# Patient Record
Sex: FEMALE | Race: WHITE | NOT HISPANIC OR LATINO | Employment: OTHER | URBAN - METROPOLITAN AREA
[De-identification: names, ages, dates, MRNs, and addresses within clinical notes are randomized per-mention and may not be internally consistent; named-entity substitution may affect disease eponyms.]

---

## 2018-04-13 LAB
ALBUMIN SERPL BCP-MCNC: 4.2 G/DL (ref 3.5–5.7)
ALP SERPL-CCNC: 65 IU/L (ref 55–165)
ALT SERPL W P-5'-P-CCNC: 12 IU/L (ref 10–30)
ANION GAP SERPL CALCULATED.3IONS-SCNC: 11.2 MM/L
AST SERPL W P-5'-P-CCNC: 15 U/L (ref 7–26)
BASOPHILS # BLD AUTO: 0 X3/UL (ref 0–0.3)
BASOPHILS # BLD AUTO: 0.5 % (ref 0–2)
BILIRUB SERPL-MCNC: 0.5 MG/DL (ref 0.3–1)
BUN SERPL-MCNC: 13 MG/DL (ref 7–25)
CALCIUM SERPL-MCNC: 9.1 MG/DL (ref 8.6–10.5)
CHLORIDE SERPL-SCNC: 105 MM/L (ref 98–107)
CO2 SERPL-SCNC: 28 MM/L (ref 21–31)
CREAT SERPL-MCNC: 0.66 MG/DL (ref 0.6–1.2)
DEPRECATED RDW RBC AUTO: 15.5 %
EGFR (HISTORICAL): > 60 GFR
EGFR AFRICAN AMERICAN (HISTORICAL): > 60 GFR
EOSINOPHIL # BLD AUTO: 0.2 X3/UL (ref 0–0.5)
EOSINOPHIL NFR BLD AUTO: 5.6 % (ref 0–5)
GLUCOSE (HISTORICAL): 133 MG/DL (ref 65–99)
HCT VFR BLD AUTO: 40.4 % (ref 37–47)
HGB BLD-MCNC: 14.3 G/DL (ref 12–16)
LYMPHOCYTES # BLD AUTO: 1 X3/UL (ref 1.2–4.2)
LYMPHOCYTES NFR BLD AUTO: 24.5 % (ref 20.5–51.1)
MCH RBC QN AUTO: 35.9 PG (ref 26–34)
MCHC RBC AUTO-ENTMCNC: 35.4 G/DL (ref 31–37)
MCV RBC AUTO: 101.3 FL (ref 81–99)
MONOCYTES # BLD AUTO: 0.5 X3/UL (ref 0–1)
MONOCYTES NFR BLD AUTO: 12.7 % (ref 1.7–12)
NEUTROPHILS # BLD AUTO: 2.3 X3/UL (ref 1.4–6.5)
NEUTS SEG NFR BLD AUTO: 56.7 % (ref 42.2–75.2)
OSMOLALITY, SERUM (HISTORICAL): 281 MOSM (ref 262–291)
PLATELET # BLD AUTO: 220 X3/UL (ref 130–400)
PMV BLD AUTO: 7.7 FL
POTASSIUM SERPL-SCNC: 4.2 MM/L (ref 3.5–5.5)
RBC # BLD AUTO: 3.98 X6/UL (ref 3.9–5.2)
SODIUM SERPL-SCNC: 140 MM/L (ref 134–143)
TOTAL PROTEIN (HISTORICAL): 7 G/DL (ref 6.4–8.9)
WBC # BLD AUTO: 4 X3/UL (ref 4.8–10.8)

## 2018-04-27 LAB
ALBUMIN SERPL BCP-MCNC: 4.1 G/DL (ref 3.5–5.7)
ALP SERPL-CCNC: 64 IU/L (ref 55–165)
ALT SERPL W P-5'-P-CCNC: 17 IU/L (ref 10–30)
ANION GAP SERPL CALCULATED.3IONS-SCNC: 12.1 MM/L
AST SERPL W P-5'-P-CCNC: 22 U/L (ref 7–26)
BASOPHILS # BLD AUTO: 0 X3/UL (ref 0–0.3)
BASOPHILS # BLD AUTO: 0.4 % (ref 0–2)
BILIRUB SERPL-MCNC: 0.8 MG/DL (ref 0.3–1)
BUN SERPL-MCNC: 11 MG/DL (ref 7–25)
CALCIUM SERPL-MCNC: 9.1 MG/DL (ref 8.6–10.5)
CHLORIDE SERPL-SCNC: 104 MM/L (ref 98–107)
CO2 SERPL-SCNC: 28 MM/L (ref 21–31)
CREAT SERPL-MCNC: 0.61 MG/DL (ref 0.6–1.2)
DEPRECATED RDW RBC AUTO: 16.1 %
EGFR (HISTORICAL): > 60 GFR
EGFR AFRICAN AMERICAN (HISTORICAL): > 60 GFR
EOSINOPHIL # BLD AUTO: 0.2 X3/UL (ref 0–0.5)
EOSINOPHIL NFR BLD AUTO: 5.5 % (ref 0–5)
GLUCOSE (HISTORICAL): 102 MG/DL (ref 65–99)
HCT VFR BLD AUTO: 42.3 % (ref 37–47)
HGB BLD-MCNC: 15 G/DL (ref 12–16)
LYMPHOCYTES # BLD AUTO: 0.6 X3/UL (ref 1.2–4.2)
LYMPHOCYTES NFR BLD AUTO: 14.1 % (ref 20.5–51.1)
MCH RBC QN AUTO: 36 PG (ref 26–34)
MCHC RBC AUTO-ENTMCNC: 35.5 G/DL (ref 31–37)
MCV RBC AUTO: 101.5 FL (ref 81–99)
MONOCYTES # BLD AUTO: 0.6 X3/UL (ref 0–1)
MONOCYTES NFR BLD AUTO: 12.9 % (ref 1.7–12)
NEUTROPHILS # BLD AUTO: 3 X3/UL (ref 1.4–6.5)
NEUTS SEG NFR BLD AUTO: 67.1 % (ref 42.2–75.2)
OSMOLALITY, SERUM (HISTORICAL): 279 MOSM (ref 262–291)
PLATELET # BLD AUTO: 158 X3/UL (ref 130–400)
PMV BLD AUTO: 8 FL
POTASSIUM SERPL-SCNC: 4.1 MM/L (ref 3.5–5.5)
RBC # BLD AUTO: 4.16 X6/UL (ref 3.9–5.2)
SODIUM SERPL-SCNC: 140 MM/L (ref 134–143)
TOTAL PROTEIN (HISTORICAL): 6.9 G/DL (ref 6.4–8.9)
WBC # BLD AUTO: 4.4 X3/UL (ref 4.8–10.8)

## 2018-04-29 LAB — CANCER AG19-9 SERPL-ACNC: 12 U/ML (ref 0–35)

## 2018-05-07 LAB
BASOPHILS # BLD AUTO: 0 X3/UL (ref 0–0.3)
BASOPHILS # BLD AUTO: 0.7 % (ref 0–2)
DEPRECATED RDW RBC AUTO: 16.5 %
EOSINOPHIL # BLD AUTO: 0.3 X3/UL (ref 0–0.5)
EOSINOPHIL NFR BLD AUTO: 6.4 % (ref 0–5)
HCT VFR BLD AUTO: 41.2 % (ref 37–47)
HGB BLD-MCNC: 14.5 G/DL (ref 12–16)
INR PPP: 1.15 (ref 0.9–1.5)
LYMPHOCYTES # BLD AUTO: 0.6 X3/UL (ref 1.2–4.2)
LYMPHOCYTES NFR BLD AUTO: 12.2 % (ref 20.5–51.1)
MCH RBC QN AUTO: 35.8 PG (ref 26–34)
MCHC RBC AUTO-ENTMCNC: 35.3 G/DL (ref 31–37)
MCV RBC AUTO: 101.3 FL (ref 81–99)
MONOCYTES # BLD AUTO: 0.8 X3/UL (ref 0–1)
MONOCYTES NFR BLD AUTO: 16.6 % (ref 1.7–12)
NEUTROPHILS # BLD AUTO: 3.1 X3/UL (ref 1.4–6.5)
NEUTS SEG NFR BLD AUTO: 64.1 % (ref 42.2–75.2)
PLATELET # BLD AUTO: 154 X3/UL (ref 130–400)
PMV BLD AUTO: 7.6 FL
PROTHROMBIN TIME (HISTORICAL): 13.3 SEC (ref 10.1–12.9)
RBC # BLD AUTO: 4.06 X6/UL (ref 3.9–5.2)
WBC # BLD AUTO: 4.9 X3/UL (ref 4.8–10.8)

## 2018-06-08 LAB
ALBUMIN SERPL BCP-MCNC: 4 G/DL (ref 3.5–5.7)
ALP SERPL-CCNC: 68 IU/L (ref 55–165)
ALT SERPL W P-5'-P-CCNC: 13 IU/L (ref 10–30)
ANION GAP SERPL CALCULATED.3IONS-SCNC: 13.1 MM/L
AST SERPL W P-5'-P-CCNC: 16 U/L (ref 7–26)
BASOPHILS # BLD AUTO: 0 X3/UL (ref 0–0.3)
BASOPHILS # BLD AUTO: 0.5 % (ref 0–2)
BILIRUB SERPL-MCNC: 0.5 MG/DL (ref 0.3–1)
BUN SERPL-MCNC: 11 MG/DL (ref 7–25)
CALCIUM SERPL-MCNC: 8.7 MG/DL (ref 8.6–10.5)
CHLORIDE SERPL-SCNC: 103 MM/L (ref 98–107)
CO2 SERPL-SCNC: 28 MM/L (ref 21–31)
CREAT SERPL-MCNC: 0.75 MG/DL (ref 0.6–1.2)
DEPRECATED RDW RBC AUTO: 14.8 %
EGFR (HISTORICAL): > 60 GFR
EGFR AFRICAN AMERICAN (HISTORICAL): > 60 GFR
EOSINOPHIL # BLD AUTO: 0.3 X3/UL (ref 0–0.5)
EOSINOPHIL NFR BLD AUTO: 4.9 % (ref 0–5)
GLUCOSE (HISTORICAL): 104 MG/DL (ref 65–99)
HCT VFR BLD AUTO: 42.3 % (ref 37–47)
HGB BLD-MCNC: 14.8 G/DL (ref 12–16)
LYMPHOCYTES # BLD AUTO: 1 X3/UL (ref 1.2–4.2)
LYMPHOCYTES NFR BLD AUTO: 19.8 % (ref 20.5–51.1)
MCH RBC QN AUTO: 34.9 PG (ref 26–34)
MCHC RBC AUTO-ENTMCNC: 35 G/DL (ref 31–37)
MCV RBC AUTO: 99.8 FL (ref 81–99)
MONOCYTES # BLD AUTO: 0.6 X3/UL (ref 0–1)
MONOCYTES NFR BLD AUTO: 12.3 % (ref 1.7–12)
NEUTROPHILS # BLD AUTO: 3.3 X3/UL (ref 1.4–6.5)
NEUTS SEG NFR BLD AUTO: 62.5 % (ref 42.2–75.2)
OSMOLALITY, SERUM (HISTORICAL): 279 MOSM (ref 262–291)
PLATELET # BLD AUTO: 199 X3/UL (ref 130–400)
PMV BLD AUTO: 7.9 FL
POTASSIUM SERPL-SCNC: 4.1 MM/L (ref 3.5–5.5)
RBC # BLD AUTO: 4.24 X6/UL (ref 3.9–5.2)
SODIUM SERPL-SCNC: 140 MM/L (ref 134–143)
TOTAL PROTEIN (HISTORICAL): 6.7 G/DL (ref 6.4–8.9)
WBC # BLD AUTO: 5.3 X3/UL (ref 4.8–10.8)

## 2018-06-11 LAB — CANCER AG19-9 SERPL-ACNC: 16 U/ML (ref 0–35)

## 2018-07-03 ENCOUNTER — TRANSCRIBE ORDERS (OUTPATIENT)
Dept: ADMINISTRATIVE | Facility: HOSPITAL | Age: 68
End: 2018-07-03

## 2018-07-03 ENCOUNTER — APPOINTMENT (OUTPATIENT)
Dept: LAB | Facility: HOSPITAL | Age: 68
End: 2018-07-03
Payer: COMMERCIAL

## 2018-07-03 DIAGNOSIS — E55.9 VITAMIN D DEFICIENCY: ICD-10-CM

## 2018-07-03 DIAGNOSIS — R53.83 FATIGUE, UNSPECIFIED TYPE: ICD-10-CM

## 2018-07-03 DIAGNOSIS — R10.84 ABDOMINAL PAIN, GENERALIZED: ICD-10-CM

## 2018-07-03 DIAGNOSIS — R53.83 FATIGUE, UNSPECIFIED TYPE: Primary | ICD-10-CM

## 2018-07-03 DIAGNOSIS — E78.2 MIXED HYPERLIPIDEMIA: ICD-10-CM

## 2018-07-03 LAB
25(OH)D3 SERPL-MCNC: 15.4 NG/ML (ref 30–100)
ALBUMIN SERPL BCP-MCNC: 4 G/DL (ref 3.5–5.7)
ALP SERPL-CCNC: 71 U/L (ref 55–165)
ALT SERPL W P-5'-P-CCNC: 15 U/L (ref 7–52)
ANION GAP SERPL CALCULATED.3IONS-SCNC: 6 MMOL/L (ref 4–13)
AST SERPL W P-5'-P-CCNC: 16 U/L (ref 13–39)
BASOPHILS # BLD AUTO: 0 THOUSANDS/ΜL (ref 0–0.1)
BASOPHILS NFR BLD AUTO: 1 % (ref 0–1)
BILIRUB DIRECT SERPL-MCNC: 0.1 MG/DL (ref 0–0.2)
BILIRUB SERPL-MCNC: 0.5 MG/DL (ref 0.2–1)
BUN SERPL-MCNC: 15 MG/DL (ref 7–25)
CALCIUM SERPL-MCNC: 9.1 MG/DL (ref 8.6–10.5)
CHLORIDE SERPL-SCNC: 107 MMOL/L (ref 98–107)
CHOLEST SERPL-MCNC: 125 MG/DL (ref 0–200)
CO2 SERPL-SCNC: 28 MMOL/L (ref 21–31)
CREAT SERPL-MCNC: 0.7 MG/DL (ref 0.6–1.2)
EOSINOPHIL # BLD AUTO: 0.2 THOUSAND/ΜL (ref 0–0.61)
EOSINOPHIL NFR BLD AUTO: 5 % (ref 0–6)
ERYTHROCYTE [DISTWIDTH] IN BLOOD BY AUTOMATED COUNT: 14.4 % (ref 11.6–15.1)
FERRITIN SERPL-MCNC: 89 NG/ML (ref 8–388)
GFR SERPL CREATININE-BSD FRML MDRD: 90 ML/MIN/1.73SQ M
GLUCOSE P FAST SERPL-MCNC: 105 MG/DL (ref 65–99)
HCT VFR BLD AUTO: 43.5 % (ref 37–47)
HDLC SERPL-MCNC: 38 MG/DL (ref 40–60)
HGB BLD-MCNC: 15.2 G/DL (ref 11.5–15.4)
IRON SATN MFR SERPL: 25 %
IRON SERPL-MCNC: 79 UG/DL (ref 50–170)
LDLC SERPL CALC-MCNC: 67 MG/DL (ref 0–100)
LYMPHOCYTES # BLD AUTO: 0.9 THOUSANDS/ΜL (ref 0.6–4.47)
LYMPHOCYTES NFR BLD AUTO: 19 % (ref 14–44)
MCH RBC QN AUTO: 33.9 PG (ref 26.8–34.3)
MCHC RBC AUTO-ENTMCNC: 35 G/DL (ref 31.4–37.4)
MCV RBC AUTO: 97 FL (ref 82–98)
MONOCYTES # BLD AUTO: 0.5 THOUSAND/ΜL (ref 0.17–1.22)
MONOCYTES NFR BLD AUTO: 12 % (ref 4–12)
NEUTROPHILS # BLD AUTO: 2.9 THOUSANDS/ΜL (ref 1.85–7.62)
NEUTS SEG NFR BLD AUTO: 63 % (ref 43–75)
NONHDLC SERPL-MCNC: 87 MG/DL
NRBC BLD AUTO-RTO: 0 /100 WBCS
PLATELET # BLD AUTO: 225 THOUSANDS/UL (ref 149–390)
PMV BLD AUTO: 7.7 FL (ref 8.9–12.7)
POTASSIUM SERPL-SCNC: 4.1 MMOL/L (ref 3.5–5.5)
PROT SERPL-MCNC: 7.3 G/DL (ref 6.4–8.9)
RBC # BLD AUTO: 4.49 MILLION/UL (ref 3.81–5.12)
SODIUM SERPL-SCNC: 141 MMOL/L (ref 134–143)
TIBC SERPL-MCNC: 312 UG/DL (ref 250–450)
TRIGL SERPL-MCNC: 99 MG/DL (ref 44–166)
VIT B12 SERPL-MCNC: 315 PG/ML (ref 100–900)
WBC # BLD AUTO: 4.6 THOUSAND/UL (ref 4.31–10.16)

## 2018-07-03 PROCEDURE — 80061 LIPID PANEL: CPT

## 2018-07-03 PROCEDURE — 83550 IRON BINDING TEST: CPT

## 2018-07-03 PROCEDURE — 83540 ASSAY OF IRON: CPT

## 2018-07-03 PROCEDURE — 80048 BASIC METABOLIC PNL TOTAL CA: CPT

## 2018-07-03 PROCEDURE — 36415 COLL VENOUS BLD VENIPUNCTURE: CPT

## 2018-07-03 PROCEDURE — 80076 HEPATIC FUNCTION PANEL: CPT

## 2018-07-03 PROCEDURE — 82306 VITAMIN D 25 HYDROXY: CPT

## 2018-07-03 PROCEDURE — 85025 COMPLETE CBC W/AUTO DIFF WBC: CPT

## 2018-07-03 PROCEDURE — 82728 ASSAY OF FERRITIN: CPT

## 2018-07-03 PROCEDURE — 82607 VITAMIN B-12: CPT

## 2018-10-01 ENCOUNTER — APPOINTMENT (OUTPATIENT)
Dept: LAB | Facility: HOSPITAL | Age: 68
End: 2018-10-01
Payer: COMMERCIAL

## 2018-10-01 ENCOUNTER — TRANSCRIBE ORDERS (OUTPATIENT)
Dept: ADMINISTRATIVE | Facility: HOSPITAL | Age: 68
End: 2018-10-01

## 2018-10-01 DIAGNOSIS — R10.84 ABDOMINAL PAIN, GENERALIZED: ICD-10-CM

## 2018-10-01 DIAGNOSIS — R53.83 FATIGUE, UNSPECIFIED TYPE: ICD-10-CM

## 2018-10-01 DIAGNOSIS — E78.2 MIXED HYPERLIPIDEMIA: ICD-10-CM

## 2018-10-01 DIAGNOSIS — E55.9 VITAMIN D INSUFFICIENCY: ICD-10-CM

## 2018-10-01 DIAGNOSIS — C23 MALIGNANT NEOPLASM OF GALLBLADDER (HCC): ICD-10-CM

## 2018-10-01 DIAGNOSIS — R53.83 FATIGUE, UNSPECIFIED TYPE: Primary | ICD-10-CM

## 2018-10-01 DIAGNOSIS — C23 MALIGNANT NEOPLASM OF GALLBLADDER (HCC): Primary | ICD-10-CM

## 2018-10-01 LAB
25(OH)D3 SERPL-MCNC: 21.4 NG/ML (ref 30–100)
ALBUMIN SERPL BCP-MCNC: 4.1 G/DL (ref 3.5–5.7)
ALP SERPL-CCNC: 72 U/L (ref 55–165)
ALT SERPL W P-5'-P-CCNC: 14 U/L (ref 7–52)
ANION GAP SERPL CALCULATED.3IONS-SCNC: 7 MMOL/L (ref 4–13)
AST SERPL W P-5'-P-CCNC: 15 U/L (ref 13–39)
BASOPHILS # BLD AUTO: 0 THOUSANDS/ΜL (ref 0–0.1)
BASOPHILS NFR BLD AUTO: 1 % (ref 0–1)
BILIRUB DIRECT SERPL-MCNC: 0.2 MG/DL (ref 0–0.2)
BILIRUB SERPL-MCNC: 0.6 MG/DL (ref 0.2–1)
BUN SERPL-MCNC: 13 MG/DL (ref 7–25)
CALCIUM SERPL-MCNC: 9.3 MG/DL (ref 8.6–10.5)
CHLORIDE SERPL-SCNC: 106 MMOL/L (ref 98–107)
CHOLEST SERPL-MCNC: 117 MG/DL (ref 0–200)
CO2 SERPL-SCNC: 28 MMOL/L (ref 21–31)
CREAT SERPL-MCNC: 0.75 MG/DL (ref 0.6–1.2)
EOSINOPHIL # BLD AUTO: 0.3 THOUSAND/ΜL (ref 0–0.61)
EOSINOPHIL NFR BLD AUTO: 5 % (ref 0–6)
ERYTHROCYTE [DISTWIDTH] IN BLOOD BY AUTOMATED COUNT: 13.3 % (ref 11.6–15.1)
FERRITIN SERPL-MCNC: 90 NG/ML (ref 8–388)
GFR SERPL CREATININE-BSD FRML MDRD: 83 ML/MIN/1.73SQ M
GLUCOSE P FAST SERPL-MCNC: 100 MG/DL (ref 65–99)
HCT VFR BLD AUTO: 44.8 % (ref 37–47)
HDLC SERPL-MCNC: 41 MG/DL (ref 40–60)
HGB BLD-MCNC: 15.1 G/DL (ref 11.5–15.4)
IRON SATN MFR SERPL: 26 %
IRON SERPL-MCNC: 75 UG/DL (ref 50–170)
LDLC SERPL CALC-MCNC: 57 MG/DL (ref 0–100)
LYMPHOCYTES # BLD AUTO: 1.1 THOUSANDS/ΜL (ref 0.6–4.47)
LYMPHOCYTES NFR BLD AUTO: 23 % (ref 14–44)
MCH RBC QN AUTO: 32 PG (ref 26.8–34.3)
MCHC RBC AUTO-ENTMCNC: 33.8 G/DL (ref 31.4–37.4)
MCV RBC AUTO: 95 FL (ref 82–98)
MONOCYTES # BLD AUTO: 0.4 THOUSAND/ΜL (ref 0.17–1.22)
MONOCYTES NFR BLD AUTO: 8 % (ref 4–12)
NEUTROPHILS # BLD AUTO: 3.2 THOUSANDS/ΜL (ref 1.85–7.62)
NEUTS SEG NFR BLD AUTO: 63 % (ref 43–75)
NONHDLC SERPL-MCNC: 76 MG/DL
NRBC BLD AUTO-RTO: 0 /100 WBCS
PLATELET # BLD AUTO: 228 THOUSANDS/UL (ref 149–390)
PMV BLD AUTO: 7.3 FL (ref 8.9–12.7)
POTASSIUM SERPL-SCNC: 4.4 MMOL/L (ref 3.5–5.5)
PROT SERPL-MCNC: 7.2 G/DL (ref 6.4–8.9)
RBC # BLD AUTO: 4.73 MILLION/UL (ref 3.81–5.12)
SODIUM SERPL-SCNC: 141 MMOL/L (ref 134–143)
TIBC SERPL-MCNC: 292 UG/DL (ref 250–450)
TRIGL SERPL-MCNC: 96 MG/DL (ref 44–166)
VIT B12 SERPL-MCNC: 339 PG/ML (ref 100–900)
WBC # BLD AUTO: 5 THOUSAND/UL (ref 4.31–10.16)

## 2018-10-01 PROCEDURE — 36415 COLL VENOUS BLD VENIPUNCTURE: CPT

## 2018-10-01 PROCEDURE — 82248 BILIRUBIN DIRECT: CPT

## 2018-10-01 PROCEDURE — 82607 VITAMIN B-12: CPT

## 2018-10-01 PROCEDURE — 80061 LIPID PANEL: CPT

## 2018-10-01 PROCEDURE — 83540 ASSAY OF IRON: CPT

## 2018-10-01 PROCEDURE — 82306 VITAMIN D 25 HYDROXY: CPT

## 2018-10-01 PROCEDURE — 86301 IMMUNOASSAY TUMOR CA 19-9: CPT

## 2018-10-01 PROCEDURE — 85025 COMPLETE CBC W/AUTO DIFF WBC: CPT

## 2018-10-01 PROCEDURE — 80053 COMPREHEN METABOLIC PANEL: CPT

## 2018-10-01 PROCEDURE — 83550 IRON BINDING TEST: CPT

## 2018-10-01 PROCEDURE — 82728 ASSAY OF FERRITIN: CPT

## 2018-10-02 LAB — CANCER AG19-9 SERPL-ACNC: 13 U/ML (ref 0–35)

## 2018-10-03 ENCOUNTER — HOSPITAL ENCOUNTER (OUTPATIENT)
Dept: RADIOLOGY | Facility: HOSPITAL | Age: 68
Discharge: HOME | End: 2018-10-03
Payer: COMMERCIAL

## 2018-10-03 ENCOUNTER — TRANSCRIBE ORDERS (OUTPATIENT)
Dept: ADMINISTRATIVE | Facility: HOSPITAL | Age: 68
End: 2018-10-03

## 2018-10-03 DIAGNOSIS — M54.5 LOW BACK PAIN, UNSPECIFIED BACK PAIN LATERALITY, UNSPECIFIED CHRONICITY, WITH SCIATICA PRESENCE UNSPECIFIED: ICD-10-CM

## 2018-10-03 DIAGNOSIS — M54.5 LOW BACK PAIN, UNSPECIFIED BACK PAIN LATERALITY, UNSPECIFIED CHRONICITY, WITH SCIATICA PRESENCE UNSPECIFIED: Primary | ICD-10-CM

## 2018-10-03 PROCEDURE — 72072 X-RAY EXAM THORAC SPINE 3VWS: CPT

## 2018-10-03 PROCEDURE — 72110 X-RAY EXAM L-2 SPINE 4/>VWS: CPT

## 2019-01-07 ENCOUNTER — TRANSCRIBE ORDERS (OUTPATIENT)
Dept: ADMINISTRATIVE | Facility: HOSPITAL | Age: 69
End: 2019-01-07

## 2019-01-07 ENCOUNTER — APPOINTMENT (OUTPATIENT)
Dept: LAB | Facility: HOSPITAL | Age: 69
End: 2019-01-07
Payer: COMMERCIAL

## 2019-01-07 DIAGNOSIS — C23 PRIMARY ADENOCARCINOMA OF GALL BLADDER (HCC): ICD-10-CM

## 2019-01-07 DIAGNOSIS — C23 PRIMARY ADENOCARCINOMA OF GALL BLADDER (HCC): Primary | ICD-10-CM

## 2019-01-07 LAB
ALBUMIN SERPL BCP-MCNC: 4.4 G/DL (ref 3.5–5.7)
ALP SERPL-CCNC: 97 U/L (ref 55–165)
ALT SERPL W P-5'-P-CCNC: 13 U/L (ref 7–52)
ANION GAP SERPL CALCULATED.3IONS-SCNC: 8 MMOL/L (ref 4–13)
AST SERPL W P-5'-P-CCNC: 15 U/L (ref 13–39)
BASOPHILS # BLD AUTO: 0 THOUSANDS/ΜL (ref 0–0.1)
BASOPHILS NFR BLD AUTO: 1 % (ref 0–1)
BILIRUB SERPL-MCNC: 0.6 MG/DL (ref 0.2–1)
BUN SERPL-MCNC: 14 MG/DL (ref 7–25)
CALCIUM SERPL-MCNC: 9.6 MG/DL (ref 8.6–10.5)
CHLORIDE SERPL-SCNC: 103 MMOL/L (ref 98–107)
CO2 SERPL-SCNC: 30 MMOL/L (ref 21–31)
CREAT SERPL-MCNC: 0.78 MG/DL (ref 0.6–1.2)
EOSINOPHIL # BLD AUTO: 0.3 THOUSAND/ΜL (ref 0–0.61)
EOSINOPHIL NFR BLD AUTO: 5 % (ref 0–6)
ERYTHROCYTE [DISTWIDTH] IN BLOOD BY AUTOMATED COUNT: 13.8 % (ref 11.6–15.1)
GFR SERPL CREATININE-BSD FRML MDRD: 79 ML/MIN/1.73SQ M
GLUCOSE SERPL-MCNC: 110 MG/DL (ref 65–140)
HCT VFR BLD AUTO: 46.4 % (ref 37–47)
HGB BLD-MCNC: 15.7 G/DL (ref 11.5–15.4)
LYMPHOCYTES # BLD AUTO: 1.4 THOUSANDS/ΜL (ref 0.6–4.47)
LYMPHOCYTES NFR BLD AUTO: 25 % (ref 14–44)
MCH RBC QN AUTO: 31.9 PG (ref 26.8–34.3)
MCHC RBC AUTO-ENTMCNC: 33.9 G/DL (ref 31.4–37.4)
MCV RBC AUTO: 94 FL (ref 82–98)
MONOCYTES # BLD AUTO: 0.5 THOUSAND/ΜL (ref 0.17–1.22)
MONOCYTES NFR BLD AUTO: 8 % (ref 4–12)
NEUTROPHILS # BLD AUTO: 3.6 THOUSANDS/ΜL (ref 1.85–7.62)
NEUTS SEG NFR BLD AUTO: 62 % (ref 43–75)
NRBC BLD AUTO-RTO: 0 /100 WBCS
PLATELET # BLD AUTO: 242 THOUSANDS/UL (ref 149–390)
PMV BLD AUTO: 7.3 FL (ref 8.9–12.7)
POTASSIUM SERPL-SCNC: 4.9 MMOL/L (ref 3.5–5.5)
PROT SERPL-MCNC: 7.6 G/DL (ref 6.4–8.9)
RBC # BLD AUTO: 4.93 MILLION/UL (ref 3.81–5.12)
SODIUM SERPL-SCNC: 141 MMOL/L (ref 134–143)
WBC # BLD AUTO: 5.8 THOUSAND/UL (ref 4.31–10.16)

## 2019-01-07 PROCEDURE — 80053 COMPREHEN METABOLIC PANEL: CPT

## 2019-01-07 PROCEDURE — 86301 IMMUNOASSAY TUMOR CA 19-9: CPT

## 2019-01-07 PROCEDURE — 36415 COLL VENOUS BLD VENIPUNCTURE: CPT

## 2019-01-07 PROCEDURE — 85025 COMPLETE CBC W/AUTO DIFF WBC: CPT

## 2019-01-08 LAB — CANCER AG19-9 SERPL-ACNC: 10 U/ML (ref 0–35)

## 2019-04-16 ENCOUNTER — TRANSCRIBE ORDERS (OUTPATIENT)
Dept: ADMINISTRATIVE | Facility: HOSPITAL | Age: 69
End: 2019-04-16

## 2019-04-16 DIAGNOSIS — H93.13 TINNITUS, BILATERAL: ICD-10-CM

## 2019-04-16 DIAGNOSIS — H93.13 TINNITUS OF BOTH EARS: ICD-10-CM

## 2019-04-16 DIAGNOSIS — R42 VERTIGO: Primary | ICD-10-CM

## 2019-04-19 ENCOUNTER — HOSPITAL ENCOUNTER (OUTPATIENT)
Dept: CT IMAGING | Facility: HOSPITAL | Age: 69
Discharge: HOME | End: 2019-04-19
Payer: COMMERCIAL

## 2019-04-19 DIAGNOSIS — R42 VERTIGO: ICD-10-CM

## 2019-04-19 DIAGNOSIS — H93.13 TINNITUS, BILATERAL: ICD-10-CM

## 2019-04-19 PROCEDURE — 70450 CT HEAD/BRAIN W/O DYE: CPT

## 2019-05-05 ENCOUNTER — OFFICE VISIT (OUTPATIENT)
Dept: URGENT CARE | Facility: CLINIC | Age: 69
End: 2019-05-05
Payer: COMMERCIAL

## 2019-05-05 VITALS
TEMPERATURE: 98.1 F | SYSTOLIC BLOOD PRESSURE: 139 MMHG | DIASTOLIC BLOOD PRESSURE: 81 MMHG | WEIGHT: 184 LBS | HEIGHT: 63 IN | HEART RATE: 76 BPM | BODY MASS INDEX: 32.6 KG/M2

## 2019-05-05 DIAGNOSIS — R30.0 DYSURIA: ICD-10-CM

## 2019-05-05 DIAGNOSIS — N39.0 URINARY TRACT INFECTION WITH HEMATURIA, SITE UNSPECIFIED: Primary | ICD-10-CM

## 2019-05-05 DIAGNOSIS — R31.9 URINARY TRACT INFECTION WITH HEMATURIA, SITE UNSPECIFIED: Primary | ICD-10-CM

## 2019-05-05 LAB
SL AMB  POCT GLUCOSE, UA: ABNORMAL
SL AMB LEUKOCYTE ESTERASE,UA: ABNORMAL
SL AMB POCT BILIRUBIN,UA: ABNORMAL
SL AMB POCT BLOOD,UA: ABNORMAL
SL AMB POCT CLARITY,UA: ABNORMAL
SL AMB POCT COLOR,UA: YELLOW
SL AMB POCT KETONES,UA: ABNORMAL
SL AMB POCT NITRITE,UA: ABNORMAL
SL AMB POCT PH,UA: 6
SL AMB POCT SPECIFIC GRAVITY,UA: 1.03
SL AMB POCT URINE PROTEIN: ABNORMAL
SL AMB POCT UROBILINOGEN: 0.2

## 2019-05-05 PROCEDURE — 87086 URINE CULTURE/COLONY COUNT: CPT | Performed by: PHYSICIAN ASSISTANT

## 2019-05-05 PROCEDURE — 99203 OFFICE O/P NEW LOW 30 MIN: CPT | Performed by: PHYSICIAN ASSISTANT

## 2019-05-05 PROCEDURE — 81002 URINALYSIS NONAUTO W/O SCOPE: CPT | Performed by: PHYSICIAN ASSISTANT

## 2019-05-05 PROCEDURE — S9088 SERVICES PROVIDED IN URGENT: HCPCS | Performed by: PHYSICIAN ASSISTANT

## 2019-05-05 RX ORDER — TOBRAMYCIN AND DEXAMETHASONE 3; 1 MG/ML; MG/ML
SUSPENSION/ DROPS OPHTHALMIC
COMMUNITY
Start: 2019-02-18 | End: 2019-09-03

## 2019-05-05 RX ORDER — OMEPRAZOLE 20 MG/1
CAPSULE, DELAYED RELEASE ORAL
COMMUNITY
Start: 2019-04-30 | End: 2019-09-23 | Stop reason: SDUPTHER

## 2019-05-05 RX ORDER — NITROFURANTOIN 25; 75 MG/1; MG/1
100 CAPSULE ORAL 2 TIMES DAILY
Qty: 14 CAPSULE | Refills: 0 | Status: SHIPPED | OUTPATIENT
Start: 2019-05-05 | End: 2019-05-12

## 2019-05-05 RX ORDER — ATORVASTATIN CALCIUM 20 MG/1
TABLET, FILM COATED ORAL
COMMUNITY
Start: 2019-04-18 | End: 2019-09-23 | Stop reason: SDUPTHER

## 2019-05-05 RX ORDER — ACETAMINOPHEN 325 MG/1
TABLET ORAL AS NEEDED
COMMUNITY
End: 2022-03-10

## 2019-05-07 LAB — BACTERIA UR CULT: NORMAL

## 2019-09-03 ENCOUNTER — OFFICE VISIT (OUTPATIENT)
Dept: FAMILY MEDICINE CLINIC | Facility: CLINIC | Age: 69
End: 2019-09-03
Payer: COMMERCIAL

## 2019-09-03 VITALS
HEART RATE: 78 BPM | SYSTOLIC BLOOD PRESSURE: 114 MMHG | DIASTOLIC BLOOD PRESSURE: 72 MMHG | RESPIRATION RATE: 18 BRPM | WEIGHT: 176.2 LBS | HEIGHT: 63 IN | TEMPERATURE: 97.7 F | BODY MASS INDEX: 31.22 KG/M2 | OXYGEN SATURATION: 98 %

## 2019-09-03 DIAGNOSIS — Z11.59 NEED FOR HEPATITIS C SCREENING TEST: ICD-10-CM

## 2019-09-03 DIAGNOSIS — E78.5 HYPERLIPIDEMIA, UNSPECIFIED HYPERLIPIDEMIA TYPE: ICD-10-CM

## 2019-09-03 DIAGNOSIS — Z12.39 SCREENING FOR BREAST CANCER: ICD-10-CM

## 2019-09-03 DIAGNOSIS — E66.09 CLASS 1 OBESITY DUE TO EXCESS CALORIES WITHOUT SERIOUS COMORBIDITY WITH BODY MASS INDEX (BMI) OF 31.0 TO 31.9 IN ADULT: ICD-10-CM

## 2019-09-03 DIAGNOSIS — E55.9 VITAMIN D DEFICIENCY: ICD-10-CM

## 2019-09-03 DIAGNOSIS — Z79.899 MEDICATION MANAGEMENT: ICD-10-CM

## 2019-09-03 DIAGNOSIS — L30.9 DERMATITIS: Primary | ICD-10-CM

## 2019-09-03 DIAGNOSIS — Z13.220 SCREENING FOR CHOLESTEROL LEVEL: ICD-10-CM

## 2019-09-03 DIAGNOSIS — G47.9 SLEEP DISTURBANCE: ICD-10-CM

## 2019-09-03 DIAGNOSIS — F32.A DEPRESSION, UNSPECIFIED DEPRESSION TYPE: ICD-10-CM

## 2019-09-03 PROCEDURE — 99204 OFFICE O/P NEW MOD 45 MIN: CPT | Performed by: NURSE PRACTITIONER

## 2019-09-03 RX ORDER — TRIAMCINOLONE ACETONIDE 5 MG/G
CREAM TOPICAL 3 TIMES DAILY
Qty: 30 G | Refills: 0 | Status: SHIPPED | OUTPATIENT
Start: 2019-09-03 | End: 2020-05-21 | Stop reason: SDUPTHER

## 2019-09-03 RX ORDER — TRAVOPROST OPHTHALMIC SOLUTION 0.04 MG/ML
1 SOLUTION OPHTHALMIC
COMMUNITY
End: 2022-03-10

## 2019-09-03 RX ORDER — LANOLIN ALCOHOL/MO/W.PET/CERES
3 CREAM (GRAM) TOPICAL
Qty: 30 EACH | Refills: 2 | Status: SHIPPED | OUTPATIENT
Start: 2019-09-03 | End: 2020-04-27

## 2019-09-03 RX ORDER — BUPROPION HYDROCHLORIDE 150 MG/1
150 TABLET ORAL DAILY
Qty: 30 TABLET | Refills: 1 | Status: SHIPPED | OUTPATIENT
Start: 2019-09-03 | End: 2020-05-21

## 2019-09-03 NOTE — PROGRESS NOTES
[address] [place] [address] Primary Care        NAME: Vandana Cruz is a 77 y o  female  : 1950    MRN: [id number]  DATE: September 3, 2019  TIME: 3:12 PM    Assessment and Plan   Dermatitis [L30 9]  1  Dermatitis  triamcinolone (KENALOG) 0 5 % cream   2  Sleep disturbance  melatonin 3 mg   3  Depression, unspecified depression type  buPROPion (WELLBUTRIN XL) 150 mg 24 hr tablet   4  Screening for breast cancer  Mammo screening bilateral w 3d & cad   5  Vitamin D deficiency  Vitamin D 25 hydroxy   6  Screening for cholesterol level     7  Hyperlipidemia, unspecified hyperlipidemia type  Lipid panel   8  Medication management  Comprehensive metabolic panel   9  Class 1 obesity due to excess calories without serious comorbidity with body mass index (BMI) of 31 0 to 31 9 in adult     10  Need for hepatitis C screening test  Hepatitis C antibody       Starting Wellbutrinxl 150 mg daily  Will have patient follow up in 4 week  If tolerating will increase to 300mg  Due for Medicare wellness visit  BMI Counseling: Body mass index is 31 21 kg/m²  Discussed the patient's BMI with her  The BMI is above average  BMI counseling and education was provided to the patient  Nutrition recommendations include 3-5 servings of fruits/vegetables daily, moderation in carbohydrate intake, increasing intake of lean protein, reducing intake of saturated fat and trans fat and reducing intake of cholesterol  Exercise recommendations include exercising 3-5 times per week  Patient Instructions     Patient Instructions   Use steroid topically to rash  Take melatonin to help with sleep  Take wellbutrin daily  Follow up in 4 weeks  Chief Complaint     Chief Complaint   Patient presents with    Rash         History of Present Illness       Patient here for a new patient visit with c/o a rash starting less than 1 week ago  Also has a positive depression screening   Reports she is ok throughout the day, but wakes up in the middle of the night with worries about herself, her kids, money  Denies any HI/SI  Would like to try a medication to see if this helps because it has been going on for awhile now  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, after removal of gallbladder, it was found that it was not contained to the gallbladder and had to have lymph nodes, removed as well as part of her liver  Is following with oncology and getting scans every 4 months  Next scan coming up next month  Rash   This is a new problem  The current episode started in the past 7 days  The problem has been gradually worsening since onset  The affected locations include the left arm  Pertinent negatives include no congestion, cough, diarrhea, fatigue, rhinorrhea, shortness of breath or sore throat  Treatments tried: calamine lotion, antibacterial spray, aveeno lotion  for itch  The treatment provided mild relief  Review of Systems   Review of Systems   Constitutional: Negative for fatigue  HENT: Negative for congestion, rhinorrhea and sore throat  Respiratory: Negative for cough and shortness of breath  Gastrointestinal: Negative for diarrhea  Skin: Positive for rash     Psychiatric/Behavioral:        +depression       PHQ-9 Depression Screening    PHQ-9:    Frequency of the following problems over the past two weeks:       Little interest or pleasure in doing things:  0 - not at all  Feeling down, depressed, or hopeless:  3 - nearly every day  Trouble falling or staying asleep, or sleeping too much:  3 - nearly every day  Feeling tired or having little energy:  1 - several days  Poor appetite or overeatin - not at all  Feeling bad about yourself - or that you are a failure or have let yourself or your family down:  2 - more than half the days  Trouble concentrating on things, such as reading the newspaper or watching television:  1 - several days  Moving or speaking so slowly that other people could have noticed  Or the opposite - being so fidgety or restless that you have been moving around a lot more than usual:  0 - not at all  Thoughts that you would be better off dead, or of hurting yourself in some way:  0 - not at all  PHQ-2 Score:  3  PHQ-9 Score:  10        Current Medications       Current Outpatient Medications:     acetaminophen (TYLENOL) 325 mg tablet, Take by mouth, Disp: , Rfl:     atorvastatin (LIPITOR) 20 mg tablet, , Disp: , Rfl:     omeprazole (PriLOSEC) 20 mg delayed release capsule, , Disp: , Rfl:     travoprost (TRAVATAN-Z) 0 004 % ophthalmic solution, 1 drop daily at bedtime, Disp: , Rfl:     buPROPion (WELLBUTRIN XL) 150 mg 24 hr tablet, Take 1 tablet (150 mg total) by mouth daily, Disp: 30 tablet, Rfl: 1    melatonin 3 mg, Take 1 tablet (3 mg total) by mouth daily at bedtime, Disp: 30 each, Rfl: 2    triamcinolone (KENALOG) 0 5 % cream, Apply topically 3 (three) times a day, Disp: 30 g, Rfl: 0    Current Allergies     Allergies as of 09/03/2019 - Reviewed 09/03/2019   Allergen Reaction Noted    Naproxen Hives and Other (See Comments) 08/03/2017    Sulfa antibiotics Hives, Itching, and Swelling 08/03/2017            The following portions of the patient's history were reviewed and updated as appropriate: allergies, current medications, past family history, past medical history, past social history, past surgical history and problem list      Past Medical History:   Diagnosis Date    Cancer ([facility] [address] )     gallbladder    Hyperlipemia        Past Surgical History:   Procedure Laterality Date    CHOLECYSTECTOMY         Family History   Problem Relation Age of Onset    Heart attack Mother     Kidney disease Father     Breast cancer Sister          Medications have been verified          Objective   /72   Pulse 78   Temp 97 7 °F (36 5 °C) (Tympanic)   Resp 18   Ht 5' 3" (1 6 m)   Wt 79 9 kg (176 lb 3 2 oz)   SpO2 98%   BMI 31 21 kg/m² Physical Exam     Physical Exam   Constitutional: She is oriented to person, place, and time  She appears well-developed and well-nourished  She is cooperative  She does not appear ill  No distress  HENT:   Head: Normocephalic and atraumatic  Eyes: Lids are normal    Cardiovascular: Normal rate, regular rhythm, S1 normal, S2 normal, normal heart sounds and intact distal pulses  Exam reveals no gallop and no friction rub  No murmur heard  Pulmonary/Chest: Effort normal and breath sounds normal  No respiratory distress  She has no decreased breath sounds  She has no wheezes  Abdominal: Normal appearance  Musculoskeletal: Normal range of motion  She exhibits no edema, tenderness or deformity  Neurological: She is alert and oriented to person, place, and time  Gross Neuro intact  Skin: Skin is warm  No rash noted  No erythema  Psychiatric: She has a normal mood and affect   Her behavior is normal  Thought content normal

## 2019-09-03 NOTE — PATIENT INSTRUCTIONS
Use steroid topically to rash  Take melatonin to help with sleep  Take wellbutrin daily  Follow up in 4 weeks

## 2019-09-05 ENCOUNTER — HOSPITAL ENCOUNTER (OUTPATIENT)
Dept: MAMMOGRAPHY | Facility: HOSPITAL | Age: 69
Discharge: HOME | End: 2019-09-05
Payer: COMMERCIAL

## 2019-09-05 VITALS — WEIGHT: 176 LBS | HEIGHT: 63 IN | BODY MASS INDEX: 31.18 KG/M2

## 2019-09-05 DIAGNOSIS — Z12.39 SCREENING FOR BREAST CANCER: ICD-10-CM

## 2019-09-05 PROCEDURE — 77063 BREAST TOMOSYNTHESIS BI: CPT

## 2019-09-05 PROCEDURE — 77067 SCR MAMMO BI INCL CAD: CPT

## 2019-09-23 DIAGNOSIS — E78.5 HYPERLIPIDEMIA, UNSPECIFIED HYPERLIPIDEMIA TYPE: Primary | ICD-10-CM

## 2019-09-23 DIAGNOSIS — K21.9 GASTROESOPHAGEAL REFLUX DISEASE WITHOUT ESOPHAGITIS: ICD-10-CM

## 2019-09-24 RX ORDER — ATORVASTATIN CALCIUM 20 MG/1
20 TABLET, FILM COATED ORAL DAILY
Qty: 90 TABLET | Refills: 1 | Status: SHIPPED | OUTPATIENT
Start: 2019-09-24 | End: 2020-03-23 | Stop reason: SDUPTHER

## 2019-09-24 RX ORDER — OMEPRAZOLE 20 MG/1
20 CAPSULE, DELAYED RELEASE ORAL DAILY
Qty: 90 CAPSULE | Refills: 1 | Status: SHIPPED | OUTPATIENT
Start: 2019-09-24 | End: 2020-02-28 | Stop reason: SDUPTHER

## 2019-10-01 ENCOUNTER — OFFICE VISIT (OUTPATIENT)
Dept: FAMILY MEDICINE CLINIC | Facility: CLINIC | Age: 69
End: 2019-10-01
Payer: COMMERCIAL

## 2019-10-01 VITALS
HEIGHT: 63 IN | HEART RATE: 56 BPM | OXYGEN SATURATION: 98 % | BODY MASS INDEX: 31.18 KG/M2 | RESPIRATION RATE: 18 BRPM | TEMPERATURE: 97.1 F | DIASTOLIC BLOOD PRESSURE: 66 MMHG | WEIGHT: 176 LBS | SYSTOLIC BLOOD PRESSURE: 118 MMHG

## 2019-10-01 DIAGNOSIS — R09.82 POST-NASAL DRIP: ICD-10-CM

## 2019-10-01 DIAGNOSIS — F32.A DEPRESSION, UNSPECIFIED DEPRESSION TYPE: ICD-10-CM

## 2019-10-01 DIAGNOSIS — J30.2 SEASONAL ALLERGIES: ICD-10-CM

## 2019-10-01 DIAGNOSIS — Z00.00 ENCOUNTER FOR MEDICARE ANNUAL WELLNESS EXAM: Primary | ICD-10-CM

## 2019-10-01 DIAGNOSIS — Z12.11 SCREENING FOR COLON CANCER: ICD-10-CM

## 2019-10-01 PROCEDURE — 1101F PT FALLS ASSESS-DOCD LE1/YR: CPT | Performed by: NURSE PRACTITIONER

## 2019-10-01 PROCEDURE — 1170F FXNL STATUS ASSESSED: CPT | Performed by: NURSE PRACTITIONER

## 2019-10-01 PROCEDURE — 1125F AMNT PAIN NOTED PAIN PRSNT: CPT | Performed by: NURSE PRACTITIONER

## 2019-10-01 PROCEDURE — G0439 PPPS, SUBSEQ VISIT: HCPCS | Performed by: NURSE PRACTITIONER

## 2019-10-01 PROCEDURE — 3008F BODY MASS INDEX DOCD: CPT | Performed by: NURSE PRACTITIONER

## 2019-10-01 PROCEDURE — 99214 OFFICE O/P EST MOD 30 MIN: CPT | Performed by: NURSE PRACTITIONER

## 2019-10-01 RX ORDER — FLUTICASONE PROPIONATE 50 MCG
1 SPRAY, SUSPENSION (ML) NASAL DAILY
Qty: 1 BOTTLE | Refills: 1 | Status: SHIPPED | OUTPATIENT
Start: 2019-10-01 | End: 2020-04-27

## 2019-10-01 NOTE — PROGRESS NOTES
Assessment and Plan:     Problem List Items Addressed This Visit     None      Visit Diagnoses     Encounter for Medicare annual wellness exam    -  Primary    Screening for colon cancer        Relevant Orders    Occult Blood, Fecal Immunochemical    Post-nasal drip        Relevant Medications    fluticasone (FLONASE) 50 mcg/act nasal spray         Patient has lab slips, is going to wait and get them done before hematology appointment on 11/18/19  Preventive health issues were discussed with patient, and age appropriate screening tests were ordered as noted in patient's After Visit Summary  Personalized health advice and appropriate referrals for health education or preventive services given if needed, as noted in patient's After Visit Summary  History of Present Illness:     Patient presents for Medicare Annual Wellness visit    Patient Care Team:  Julienne Jarrett as PCP - General (Family Medicine)     Problem List:     There is no problem list on file for this patient       Past Medical and Surgical History:     Past Medical History:   Diagnosis Date    [facility])     gallbladder    Hyperlipemia      Past Surgical History:   Procedure Laterality Date    CHOLECYSTECTOMY        Family History:     Family History   Problem Relation Age of Onset    Heart attack Mother     Kidney disease Father     Breast cancer Sister 77    No Known Problems Daughter     No Known Problems Maternal Grandmother     No Known Problems Paternal Grandmother     No Known Problems Sister     No Known Problems Maternal Aunt     No Known Problems Maternal Aunt     No Known Problems Maternal Aunt     No Known Problems Paternal Aunt     No Known Problems Paternal Aunt       Social History:     Social History     Socioeconomic History    Marital status: /Civil Union     Spouse name: None    Number of children: None    Years of education: None    Highest education level: None   Occupational History    None Social Needs    Financial resource strain: None    Food insecurity:     Worry: None     Inability: None    Transportation needs:     Medical: None     Non-medical: None   Tobacco Use    Smoking status: Former Smoker    Smokeless tobacco: Never Used   Substance and Sexual Activity    Alcohol use: Never     Frequency: Never    Drug use: Never    Sexual activity: None   Lifestyle    Physical activity:     Days per week: None     Minutes per session: None    Stress: None   Relationships    Social connections:     Talks on phone: None     Gets together: None     Attends Alevism service: None     Active member of club or organization: None     Attends meetings of clubs or organizations: None     Relationship status: None    Intimate partner violence:     Fear of current or ex partner: None     Emotionally abused: None     Physically abused: None     Forced sexual activity: None   Other Topics Concern    None   Social History Narrative    None       Medications and Allergies:     Current Outpatient Medications   Medication Sig Dispense Refill    acetaminophen (TYLENOL) 325 mg tablet Take by mouth      atorvastatin (LIPITOR) 20 mg tablet Take 1 tablet (20 mg total) by mouth daily 90 tablet 1    omeprazole (PriLOSEC) 20 mg delayed release capsule Take 1 capsule (20 mg total) by mouth daily 90 capsule 1    travoprost (TRAVATAN-Z) 0 004 % ophthalmic solution 1 drop daily at bedtime      triamcinolone (KENALOG) 0 5 % cream Apply topically 3 (three) times a day 30 g 0    buPROPion (WELLBUTRIN XL) 150 mg 24 hr tablet Take 1 tablet (150 mg total) by mouth daily (Patient not taking: Reported on 10/1/2019) 30 tablet 1    fluticasone (FLONASE) 50 mcg/act nasal spray 1 spray into each nostril daily 1 Bottle 1    melatonin 3 mg Take 1 tablet (3 mg total) by mouth daily at bedtime (Patient not taking: Reported on 10/1/2019) 30 each 2     No current facility-administered medications for this visit  Allergies   Allergen Reactions    Naproxen Hives and Other (See Comments)     "hives & chest pain"     Sulfa Antibiotics Hives, Itching and Swelling      Immunizations:     Immunization History   Administered Date(s) Administered    INFLUENZA 09/14/2017      Health Maintenance:         Topic Date Due    Hepatitis C Screening  1950    CRC Screening: Colonoscopy  1950    CRC Screening: FOBTx3/FIT  04/01/2001    MAMMOGRAM  09/06/2020         Topic Date Due    DTaP,Tdap,and Td Vaccines (1 - Tdap) 04/01/1972    Pneumococcal Vaccine: 65+ Years (1 of 2 - PCV13) 04/01/2016    INFLUENZA VACCINE  07/01/2019      Medicare Health Risk Assessment:     /66   Pulse 56   Temp (!) 97 1 °F (36 2 °C)   Resp 18   Ht 5' 3" (1 6 m)   Wt 79 8 kg (176 lb)   SpO2 98%   BMI 31 18 kg/m²      Maricarmen Jolley is here for her Subsequent Wellness visit  Health Risk Assessment:   Patient rates overall health as fair  Patient feels that their physical health rating is slightly better  Eyesight was rated as slightly worse  Hearing was rated as same  Patient feels that their emotional and mental health rating is same  Pain experienced in the last 7 days has been some  Patient's pain rating has been 3/10  Patient states that she has experienced no weight loss or gain in last 6 months  Depression Screening:   PHQ-2 Score: 0      Fall Risk Screening: In the past year, patient has experienced: no history of falling in past year      Urinary Incontinence Screening:   Patient has not leaked urine accidently in the last six months  Home Safety:  Patient does not have trouble with stairs inside or outside of their home  Patient has working smoke alarms and has working carbon monoxide detector  Home safety hazards include: none  Nutrition:   Current diet is Regular  Medications:   Patient is not currently taking any over-the-counter supplements  Patient is able to manage medications       Activities of Daily Living (ADLs)/Instrumental Activities of Daily Living (IADLs):   Walk and transfer into and out of bed and chair?: Yes  Dress and groom yourself?: Yes    Bathe or shower yourself?: Yes    Feed yourself?  Yes  Do your laundry/housekeeping?: Yes  Manage your money, pay your bills and track your expenses?: Yes  Make your own meals?: Yes    Do your own shopping?: Yes    Previous Hospitalizations:   Any hospitalizations or ED visits within the last 12 months?: No      Advance Care Planning:   Living will: No    Durable POA for healthcare: No    Advanced directive: No      PREVENTIVE SCREENINGS      Cardiovascular Screening:    General: Screening Not Indicated and History Lipid Disorder      Diabetes Screening:     General: Screening Current      Colorectal Cancer Screening:     General: Patient Declines and Risks and Benefits Discussed    Due for: FOBT/FIT      Breast Cancer Screening:     General: Screening Current      Cervical Cancer Screening:    General: Screening Not Indicated      Osteoporosis Screening:    General: Screening Not Indicated      Abdominal Aortic Aneurysm (AAA) Screening:        General: Screening Not Indicated      Lung Cancer Screening:     General: Screening Not Indicated      Hepatitis C Screening:    General: Risks and Benefits Discussed    Hep C Screening Accepted: Yes        PAYAL Braun

## 2019-10-01 NOTE — PROGRESS NOTES
[address] [place] [address] Primary Care        NAME: Elise Love is a 77 y o  female  : 1950    MRN: [id number]  DATE: 2019  TIME: 2:12 PM    Assessment and Plan   Encounter for Medicare annual wellness exam [B91 64]  9  Encounter for Medicare annual wellness exam     2  Screening for colon cancer  Occult Blood, Fecal Immunochemical   3  Post-nasal drip  fluticasone (FLONASE) 50 mcg/act nasal spray   4  Depression, unspecified depression type           Patient Instructions     Patient Instructions   Start Wellbutrin  Get labs done before hematology appointment, will call with results  Chief Complaint     Chief Complaint   Patient presents with    Follow-up    Medicare Wellness Visit         History of Present Illness       Patient here for AWV and depression follow up  Also with c/o ear pain  Depression is the same, never started the medication Wellbutrin because she was afraid of the side effects  She tried the melatonin but reports she took for 2 nights and was up those 2 night and couldn't sleep  Reports she usually cant sleep at night  Is willing to try the wellbutrin, will take at night  Ear pain on and off, reports cough and post nasal drip  Has used flonase in the past but has not tried anyting recently         Review of Systems   Review of Systems    PHQ-9 Depression Screening    PHQ-9:    Frequency of the following problems over the past two weeks:       Little interest or pleasure in doing things:  0 - not at all  Feeling down, depressed, or hopeless:  0 - not at all  PHQ-2 Score:  0        Current Medications       Current Outpatient Medications:     acetaminophen (TYLENOL) 325 mg tablet, Take by mouth, Disp: , Rfl:     atorvastatin (LIPITOR) 20 mg tablet, Take 1 tablet (20 mg total) by mouth daily, Disp: 90 tablet, Rfl: 1    omeprazole (PriLOSEC) 20 mg delayed release capsule, Take 1 capsule (20 mg total) by mouth daily, Disp: 90 capsule, Rfl: 1    travoprost (TRAVATAN-Z) 0 004 % ophthalmic solution, 1 drop daily at bedtime, Disp: , Rfl:     triamcinolone (KENALOG) 0 5 % cream, Apply topically 3 (three) times a day, Disp: 30 g, Rfl: 0    buPROPion (WELLBUTRIN XL) 150 mg 24 hr tablet, Take 1 tablet (150 mg total) by mouth daily (Patient not taking: Reported on 10/1/2019), Disp: 30 tablet, Rfl: 1    fluticasone (FLONASE) 50 mcg/act nasal spray, 1 spray into each nostril daily, Disp: 1 Bottle, Rfl: 1    melatonin 3 mg, Take 1 tablet (3 mg total) by mouth daily at bedtime (Patient not taking: Reported on 10/1/2019), Disp: 30 each, Rfl: 2    Current Allergies     Allergies as of 10/01/2019 - Reviewed 10/01/2019   Allergen Reaction Noted    Naproxen Hives and Other (See Comments) 08/03/2017    Sulfa antibiotics Hives, Itching, and Swelling 08/03/2017            The following portions of the patient's history were reviewed and updated as appropriate: allergies, current medications, past family history, past medical history, past social history, past surgical history and problem list      Past Medical History:   Diagnosis Date    Cancer ([facility] [address] )     gallbladder    Hyperlipemia        Past Surgical History:   Procedure Laterality Date    CHOLECYSTECTOMY         Family History   Problem Relation Age of Onset    Heart attack Mother     Kidney disease Father     Breast cancer Sister 77    No Known Problems Daughter     No Known Problems Maternal Grandmother     No Known Problems Paternal Grandmother     No Known Problems Sister     No Known Problems Maternal Aunt     No Known Problems Maternal Aunt     No Known Problems Maternal Aunt     No Known Problems Paternal Aunt     No Known Problems Paternal Aunt          Medications have been verified          Objective   /66   Pulse 56   Temp (!) 97 1 °F (36 2 °C)   Resp 18   Ht 5' 3" (1 6 m)   Wt 79 8 kg (176 lb)   SpO2 98%   BMI 31 18 kg/m²        Physical Exam     Physical Exam

## 2019-10-08 ENCOUNTER — APPOINTMENT (OUTPATIENT)
Dept: LAB | Facility: HOSPITAL | Age: 69
End: 2019-10-08
Payer: COMMERCIAL

## 2019-10-08 DIAGNOSIS — Z12.11 SCREENING FOR COLON CANCER: ICD-10-CM

## 2019-10-08 LAB — HEMOCCULT STL QL IA: NEGATIVE

## 2019-10-08 PROCEDURE — G0328 FECAL BLOOD SCRN IMMUNOASSAY: HCPCS

## 2019-11-14 DIAGNOSIS — E55.9 VITAMIN D DEFICIENCY: Primary | ICD-10-CM

## 2019-11-14 RX ORDER — ERGOCALCIFEROL 1.25 MG/1
50000 CAPSULE ORAL WEEKLY
Qty: 12 CAPSULE | Refills: 0 | Status: SHIPPED | OUTPATIENT
Start: 2019-11-14 | End: 2020-05-21

## 2020-02-03 ENCOUNTER — OFFICE VISIT (OUTPATIENT)
Dept: URGENT CARE | Facility: CLINIC | Age: 70
End: 2020-02-03
Payer: COMMERCIAL

## 2020-02-03 VITALS
BODY MASS INDEX: 31.96 KG/M2 | SYSTOLIC BLOOD PRESSURE: 125 MMHG | TEMPERATURE: 97.4 F | RESPIRATION RATE: 18 BRPM | OXYGEN SATURATION: 96 % | DIASTOLIC BLOOD PRESSURE: 63 MMHG | WEIGHT: 180.4 LBS | HEART RATE: 74 BPM | HEIGHT: 63 IN

## 2020-02-03 DIAGNOSIS — B96.89 ACUTE BACTERIAL BRONCHITIS: Primary | ICD-10-CM

## 2020-02-03 DIAGNOSIS — J20.8 ACUTE BACTERIAL BRONCHITIS: Primary | ICD-10-CM

## 2020-02-03 PROCEDURE — 99213 OFFICE O/P EST LOW 20 MIN: CPT | Performed by: NURSE PRACTITIONER

## 2020-02-03 PROCEDURE — S9088 SERVICES PROVIDED IN URGENT: HCPCS | Performed by: NURSE PRACTITIONER

## 2020-02-03 RX ORDER — DOXYCYCLINE 100 MG/1
100 TABLET ORAL 2 TIMES DAILY
Qty: 14 TABLET | Refills: 0 | Status: SHIPPED | OUTPATIENT
Start: 2020-02-03 | End: 2020-02-03 | Stop reason: CLARIF

## 2020-02-03 RX ORDER — BENZONATATE 100 MG/1
100 CAPSULE ORAL 3 TIMES DAILY PRN
Qty: 20 CAPSULE | Refills: 0 | Status: SHIPPED | OUTPATIENT
Start: 2020-02-03 | End: 2020-04-27

## 2020-02-03 RX ORDER — AZITHROMYCIN 250 MG/1
TABLET, FILM COATED ORAL
Qty: 6 TABLET | Refills: 0 | Status: SHIPPED | OUTPATIENT
Start: 2020-02-03 | End: 2020-02-07

## 2020-02-03 NOTE — PROGRESS NOTES
[email or web address]        NAME: Thee Perez is a 77 y o  female  : 1950    MRN: [id number]  DATE: February 3, 2020  TIME: 3:39 PM    Assessment and Plan   Acute bacterial bronchitis [J20 8, B96 89]  1  Acute bacterial bronchitis  doxycycline (ADOXA) 100 MG tablet    benzonatate (TESSALON PERLES) 100 mg capsule         Patient Instructions     Patient Instructions     Rest and drink extra fluids  Start antibiotic  Take probiotic  Cough medication as prescribed  Cool mist humidification can be helpful  Follow up with PCP if no improvement  Go to ER with worsening symptoms  Acute Bronchitis   AMBULATORY CARE:   Acute bronchitis  is swelling and irritation in the air passages of your lungs  This irritation may cause you to cough or have other breathing problems  Acute bronchitis often starts because of another illness, such as a cold or the flu  The illness spreads from your nose and throat to your windpipe and airways  Bronchitis is often called a chest cold  Acute bronchitis lasts about 3 to 6 weeks and is usually not a serious illness  Your cough can last for several weeks  You may have any of the following symptoms:   · A cough with sputum that may be clear, yellow, or green    · Feeling more tired than usual, and body aches    · A fever and chills    · Wheezing when you breathe    · A tight chest or pain when you breathe or cough  Seek care immediately if:   · You cough up blood  · Your lips or fingernails turn blue  · You feel like you are not getting enough air when you breathe  Contact your healthcare provider if:   · You have a fever  · Your breathing problems do not go away or get worse  · Your cough does not get better within 4 weeks  · You have questions or concerns about your condition or care  Self-care:   · Get more rest   Rest helps your body to heal  Slowly start to do more each day  Rest when you feel it is needed  · Avoid irritants in the air    Avoid chemicals, fumes, and dust  Wear a face mask if you must work around dust or fumes  Stay inside on days when air pollution levels are high  If you have allergies, stay inside when pollen counts are high  Do not use aerosol products, such as spray-on deodorant, bug spray, and hair spray  · Do not smoke or be around others who smoke  Nicotine and other chemicals in cigarettes and cigars damages the cilia that move mucus out of your lungs  Ask your healthcare provider for information if you currently smoke and need help to quit  E-cigarettes or smokeless tobacco still contain nicotine  Talk to your healthcare provider before you use these products  · Drink liquids as directed  Liquids help keep your air passages moist and help you cough up mucus  You may need to drink more liquids when you have acute bronchitis  Ask how much liquid to drink each day and which liquids are best for you  · Use a humidifier or vaporizer  Use a cool mist humidifier or a vaporizer to increase air moisture in your home  This may make it easier for you to breathe and help decrease your cough  Prevent acute bronchitis by doing the following:   · Get the vaccinations you need  Ask your healthcare provider if you should get vaccinated against the flu or pneumonia  · Prevent the spread of germs  You can decrease your risk of acute bronchitis and other illnesses by doing the following:     [facility] your hands often with soap and water  Carry germ-killing hand lotion or gel with you  You can use the lotion or gel to clean your hands when soap and water are not available  ¨ Do not touch your eyes, nose, or mouth unless you have washed your hands first     ¨ Always cover your mouth when you cough to prevent the spread of germs  It is best to cough into a tissue or your shirt sleeve instead of into your hand  Ask those around you cover their mouths when they cough  ¨ Try to avoid people who have a cold or the flu   If you are sick, stay away from others as much as possible  Medicines: Your healthcare provider may  give you any of the following:  · Ibuprofen or acetaminophen  are medicines that help lower your fever  They are available without a doctor's order  Ask your healthcare provider which medicine is right for you  Ask how much to take and how often to take it  Follow directions  These medicines can cause stomach bleeding if not taken correctly  Ibuprofen can cause kidney damage  Do not take ibuprofen if you have kidney disease, an ulcer, or allergies to aspirin  Acetaminophen can cause liver damage  Do not take more than 4,000 milligrams in 24 hours  · Decongestants  help loosen mucus in your lungs and make it easier to cough up  This can help you breathe easier  · Cough suppressants  decrease your urge to cough  If your cough produces mucus, do not take a cough suppressant unless your healthcare provider tells you to  Your healthcare provider may suggest that you take a cough suppressant at night so you can rest     · Inhalers  may be given  Your healthcare provider may give you one or more inhalers to help you breathe easier and cough less  An inhaler gives your medicine to open your airways  Ask your healthcare provider to show you how to use your inhaler correctly  Follow up with your healthcare provider as directed:  Write down questions you have so you will remember to ask them during your follow-up visits  © 2017 [facility] Information is for End User's use only and may not be sold, redistributed or otherwise used for commercial purposes  All illustrations and images included in CareNotes® are the copyrighted property of [employer]  or [employer]  The above information is an  only  It is not intended as medical advice for individual conditions or treatments   Talk to your doctor, nurse or pharmacist before following any medical regimen to see if it is safe and effective for you  Chief Complaint     Chief Complaint   Patient presents with    Cough     and some chest congestion started friday         History of Present Illness   Isaac Park presents to the clinic c/o    This is a 62-year-old female here today with complaints of cough and congestion  She states symptoms started about 5 days ago  She states symptoms started with some postnasal drip and then progressed to coughing  She has been eating and drinking  She has some pain with coughing  No shortness of breath  She does cough up greenish mucus  Review of Systems   Review of Systems   Constitutional: Positive for activity change and chills  Negative for fatigue  HENT: Positive for congestion and rhinorrhea  Negative for sinus pressure, sinus pain and sore throat  Respiratory: Positive for cough and shortness of breath  Negative for chest tightness and wheezing  Cardiovascular: Negative  Skin: Negative  Neurological: Negative  Psychiatric/Behavioral: Negative            Current Medications     Long-Term Medications   Medication Sig Dispense Refill    atorvastatin (LIPITOR) 20 mg tablet Take 1 tablet (20 mg total) by mouth daily 90 tablet 1    buPROPion (WELLBUTRIN XL) 150 mg 24 hr tablet Take 1 tablet (150 mg total) by mouth daily 30 tablet 1    ergocalciferol (VITAMIN D2) 50,000 units Take 1 capsule (50,000 Units total) by mouth once a week 12 capsule 0    fluticasone (FLONASE) 50 mcg/act nasal spray 1 spray into each nostril daily 1 Bottle 1    omeprazole (PriLOSEC) 20 mg delayed release capsule Take 1 capsule (20 mg total) by mouth daily 90 capsule 1    travoprost (TRAVATAN-Z) 0 004 % ophthalmic solution 1 drop daily at bedtime      triamcinolone (KENALOG) 0 5 % cream Apply topically 3 (three) times a day (Patient not taking: Reported on 2/3/2020) 30 g 0       Current Allergies     Allergies as of 02/03/2020 - Reviewed 02/03/2020   Allergen Reaction Noted    Naproxen Hives and Other (See Comments) 08/03/2017    Sulfa antibiotics Hives, Itching, and Swelling 08/03/2017            The following portions of the patient's history were reviewed and updated as appropriate: allergies, current medications, past family history, past medical history, past social history, past surgical history and problem list     Objective   /63   Pulse 74   Temp (!) 97 4 °F (36 3 °C)   Resp 18   Ht 5' 3" (1 6 m)   Wt 81 8 kg (180 lb 6 4 oz)   SpO2 96%   BMI 31 96 kg/m²        Physical Exam     Physical Exam   Constitutional: She is oriented to person, place, and time  HENT:   Right Ear: External ear normal    Left Ear: External ear normal    Neck: Normal range of motion  Neck supple  Cardiovascular: Normal rate and regular rhythm  Pulmonary/Chest: Effort normal and breath sounds normal  No stridor  No respiratory distress  She has no wheezes  She has no rales  She exhibits no tenderness  Productive moist cough  Neurological: She is alert and oriented to person, place, and time  Psychiatric: She has a normal mood and affect  Her behavior is normal    Nursing note and vitals reviewed

## 2020-02-28 DIAGNOSIS — K21.9 GASTROESOPHAGEAL REFLUX DISEASE WITHOUT ESOPHAGITIS: ICD-10-CM

## 2020-02-28 RX ORDER — OMEPRAZOLE 20 MG/1
20 CAPSULE, DELAYED RELEASE ORAL 2 TIMES DAILY
Qty: 180 CAPSULE | Refills: 1 | Status: SHIPPED | OUTPATIENT
Start: 2020-02-28 | End: 2020-07-27

## 2020-02-28 NOTE — TELEPHONE ENCOUNTER
Patient called requesting refill of Prilosec to be sent to [address]  Patient states she takes 1 tab BID

## 2020-03-23 DIAGNOSIS — E78.5 HYPERLIPIDEMIA, UNSPECIFIED HYPERLIPIDEMIA TYPE: ICD-10-CM

## 2020-03-23 RX ORDER — ATORVASTATIN CALCIUM 20 MG/1
20 TABLET, FILM COATED ORAL DAILY
Qty: 90 TABLET | Refills: 1 | Status: SHIPPED | OUTPATIENT
Start: 2020-03-23 | End: 2020-09-15 | Stop reason: SDUPTHER

## 2020-04-27 ENCOUNTER — TELEMEDICINE (OUTPATIENT)
Dept: FAMILY MEDICINE CLINIC | Facility: CLINIC | Age: 70
End: 2020-04-27
Payer: COMMERCIAL

## 2020-04-27 VITALS — HEART RATE: 76 BPM | RESPIRATION RATE: 16 BRPM

## 2020-04-27 DIAGNOSIS — J06.9 URI, ACUTE: ICD-10-CM

## 2020-04-27 DIAGNOSIS — J30.1 SEASONAL ALLERGIC RHINITIS DUE TO POLLEN: Primary | ICD-10-CM

## 2020-04-27 PROCEDURE — 99213 OFFICE O/P EST LOW 20 MIN: CPT | Performed by: NURSE PRACTITIONER

## 2020-04-27 RX ORDER — ALBUTEROL SULFATE 90 UG/1
2 AEROSOL, METERED RESPIRATORY (INHALATION) EVERY 6 HOURS PRN
Qty: 1 INHALER | Refills: 3 | Status: SHIPPED | OUTPATIENT
Start: 2020-04-27 | End: 2020-08-21

## 2020-04-27 RX ORDER — LEVOCETIRIZINE DIHYDROCHLORIDE 5 MG/1
5 TABLET, FILM COATED ORAL EVERY EVENING
Qty: 90 TABLET | Refills: 1 | Status: SHIPPED | OUTPATIENT
Start: 2020-04-27 | End: 2020-08-21

## 2020-04-27 RX ORDER — PREDNISONE 20 MG/1
20 TABLET ORAL 2 TIMES DAILY WITH MEALS
Qty: 10 TABLET | Refills: 0 | Status: SHIPPED | OUTPATIENT
Start: 2020-04-27 | End: 2020-05-02

## 2020-04-27 RX ORDER — FLUTICASONE PROPIONATE 50 MCG
1 SPRAY, SUSPENSION (ML) NASAL DAILY
Qty: 32 G | Refills: 1 | Status: SHIPPED | OUTPATIENT
Start: 2020-04-27 | End: 2020-08-21

## 2020-04-30 ENCOUNTER — TELEMEDICINE (OUTPATIENT)
Dept: FAMILY MEDICINE CLINIC | Facility: CLINIC | Age: 70
End: 2020-04-30
Payer: COMMERCIAL

## 2020-04-30 VITALS — RESPIRATION RATE: 16 BRPM

## 2020-04-30 DIAGNOSIS — J30.1 SEASONAL ALLERGIC RHINITIS DUE TO POLLEN: ICD-10-CM

## 2020-04-30 DIAGNOSIS — J01.01 ACUTE RECURRENT MAXILLARY SINUSITIS: Primary | ICD-10-CM

## 2020-04-30 PROCEDURE — 99213 OFFICE O/P EST LOW 20 MIN: CPT | Performed by: NURSE PRACTITIONER

## 2020-04-30 RX ORDER — MONTELUKAST SODIUM 10 MG/1
10 TABLET ORAL
Qty: 90 TABLET | Refills: 3 | Status: SHIPPED | OUTPATIENT
Start: 2020-04-30 | End: 2020-08-21

## 2020-04-30 RX ORDER — DEXTROMETHORPHAN HYDROBROMIDE AND PROMETHAZINE HYDROCHLORIDE 15; 6.25 MG/5ML; MG/5ML
5 SOLUTION ORAL 4 TIMES DAILY PRN
Qty: 240 ML | Refills: 0 | Status: SHIPPED | OUTPATIENT
Start: 2020-04-30 | End: 2020-05-21

## 2020-04-30 RX ORDER — AMOXICILLIN AND CLAVULANATE POTASSIUM 875; 125 MG/1; MG/1
1 TABLET, FILM COATED ORAL EVERY 12 HOURS SCHEDULED
Qty: 20 TABLET | Refills: 0 | Status: SHIPPED | OUTPATIENT
Start: 2020-04-30 | End: 2020-05-10

## 2020-05-04 ENCOUNTER — TELEMEDICINE (OUTPATIENT)
Dept: FAMILY MEDICINE CLINIC | Facility: CLINIC | Age: 70
End: 2020-05-04
Payer: COMMERCIAL

## 2020-05-04 DIAGNOSIS — H54.61 VISION LOSS OF RIGHT EYE: Primary | ICD-10-CM

## 2020-05-04 DIAGNOSIS — M79.645 PAIN IN FINGER OF BOTH HANDS: ICD-10-CM

## 2020-05-04 DIAGNOSIS — M79.644 PAIN IN FINGER OF BOTH HANDS: ICD-10-CM

## 2020-05-04 DIAGNOSIS — R52 BURNING PAIN: ICD-10-CM

## 2020-05-04 PROCEDURE — 1160F RVW MEDS BY RX/DR IN RCRD: CPT | Performed by: NURSE PRACTITIONER

## 2020-05-04 PROCEDURE — 99214 OFFICE O/P EST MOD 30 MIN: CPT | Performed by: NURSE PRACTITIONER

## 2020-05-21 ENCOUNTER — OFFICE VISIT (OUTPATIENT)
Dept: FAMILY MEDICINE CLINIC | Facility: CLINIC | Age: 70
End: 2020-05-21
Payer: COMMERCIAL

## 2020-05-21 VITALS
TEMPERATURE: 98.9 F | RESPIRATION RATE: 14 BRPM | HEIGHT: 63 IN | DIASTOLIC BLOOD PRESSURE: 78 MMHG | BODY MASS INDEX: 34.02 KG/M2 | SYSTOLIC BLOOD PRESSURE: 130 MMHG | OXYGEN SATURATION: 98 % | WEIGHT: 192 LBS | HEART RATE: 54 BPM

## 2020-05-21 DIAGNOSIS — E78.1 PURE HYPERTRIGLYCERIDEMIA: ICD-10-CM

## 2020-05-21 DIAGNOSIS — L30.9 DERMATITIS: ICD-10-CM

## 2020-05-21 DIAGNOSIS — H93.12 TINNITUS OF LEFT EAR: ICD-10-CM

## 2020-05-21 DIAGNOSIS — E55.9 VITAMIN D DEFICIENCY: Primary | ICD-10-CM

## 2020-05-21 PROBLEM — C23 PRIMARY GALL BLADDER ADENOCARCINOMA (HCC): Status: ACTIVE | Noted: 2017-08-03

## 2020-05-21 PROBLEM — K57.90 DIVERTICULOSIS: Status: ACTIVE | Noted: 2020-05-21

## 2020-05-21 PROCEDURE — 1036F TOBACCO NON-USER: CPT | Performed by: NURSE PRACTITIONER

## 2020-05-21 PROCEDURE — 99214 OFFICE O/P EST MOD 30 MIN: CPT | Performed by: NURSE PRACTITIONER

## 2020-05-21 PROCEDURE — 1160F RVW MEDS BY RX/DR IN RCRD: CPT | Performed by: NURSE PRACTITIONER

## 2020-05-21 PROCEDURE — 3008F BODY MASS INDEX DOCD: CPT | Performed by: NURSE PRACTITIONER

## 2020-05-21 RX ORDER — PREDNISONE 20 MG/1
TABLET ORAL
Qty: 9 TABLET | Refills: 0 | Status: SHIPPED | OUTPATIENT
Start: 2020-05-21 | End: 2020-08-21

## 2020-05-21 RX ORDER — TRIAMCINOLONE ACETONIDE 5 MG/G
CREAM TOPICAL 2 TIMES DAILY
Qty: 30 G | Refills: 0 | Status: SHIPPED | OUTPATIENT
Start: 2020-05-21 | End: 2020-08-21

## 2020-07-25 DIAGNOSIS — K21.9 GASTROESOPHAGEAL REFLUX DISEASE WITHOUT ESOPHAGITIS: ICD-10-CM

## 2020-07-27 RX ORDER — OMEPRAZOLE 20 MG/1
CAPSULE, DELAYED RELEASE ORAL
Qty: 180 CAPSULE | Refills: 0 | Status: SHIPPED | OUTPATIENT
Start: 2020-07-27 | End: 2021-03-08 | Stop reason: SDUPTHER

## 2020-08-20 ENCOUNTER — TELEPHONE (OUTPATIENT)
Dept: OTHER | Facility: OTHER | Age: 70
End: 2020-08-20

## 2020-08-21 ENCOUNTER — OFFICE VISIT (OUTPATIENT)
Dept: FAMILY MEDICINE CLINIC | Facility: CLINIC | Age: 70
End: 2020-08-21
Payer: COMMERCIAL

## 2020-08-21 VITALS
HEART RATE: 72 BPM | WEIGHT: 189 LBS | HEIGHT: 63 IN | RESPIRATION RATE: 12 BRPM | TEMPERATURE: 98.6 F | OXYGEN SATURATION: 98 % | BODY MASS INDEX: 33.49 KG/M2 | DIASTOLIC BLOOD PRESSURE: 64 MMHG | SYSTOLIC BLOOD PRESSURE: 112 MMHG

## 2020-08-21 DIAGNOSIS — R06.02 SOB (SHORTNESS OF BREATH): ICD-10-CM

## 2020-08-21 DIAGNOSIS — R25.2 CRAMPING OF HANDS: Primary | ICD-10-CM

## 2020-08-21 DIAGNOSIS — R35.0 URINARY FREQUENCY: ICD-10-CM

## 2020-08-21 DIAGNOSIS — R31.9 URINARY TRACT INFECTION WITH HEMATURIA, SITE UNSPECIFIED: ICD-10-CM

## 2020-08-21 DIAGNOSIS — R00.2 POUNDING HEARTBEAT: ICD-10-CM

## 2020-08-21 DIAGNOSIS — M79.602 PAIN IN BOTH UPPER EXTREMITIES: ICD-10-CM

## 2020-08-21 DIAGNOSIS — R53.83 OTHER FATIGUE: ICD-10-CM

## 2020-08-21 DIAGNOSIS — R01.1 MURMUR: ICD-10-CM

## 2020-08-21 DIAGNOSIS — N39.0 URINARY TRACT INFECTION WITH HEMATURIA, SITE UNSPECIFIED: ICD-10-CM

## 2020-08-21 DIAGNOSIS — M79.601 PAIN IN BOTH UPPER EXTREMITIES: ICD-10-CM

## 2020-08-21 DIAGNOSIS — R42 DIZZINESS: ICD-10-CM

## 2020-08-21 LAB
SL AMB  POCT GLUCOSE, UA: NEGATIVE
SL AMB LEUKOCYTE ESTERASE,UA: ABNORMAL
SL AMB POCT BILIRUBIN,UA: ABNORMAL
SL AMB POCT BLOOD,UA: ABNORMAL
SL AMB POCT CLARITY,UA: CLEAR
SL AMB POCT COLOR,UA: YELLOW
SL AMB POCT KETONES,UA: NEGATIVE
SL AMB POCT NITRITE,UA: NEGATIVE
SL AMB POCT PH,UA: 5.5
SL AMB POCT SPECIFIC GRAVITY,UA: 1.01
SL AMB POCT URINE PROTEIN: NEGATIVE
SL AMB POCT UROBILINOGEN: 0.2

## 2020-08-21 PROCEDURE — 87086 URINE CULTURE/COLONY COUNT: CPT | Performed by: NURSE PRACTITIONER

## 2020-08-21 PROCEDURE — 99214 OFFICE O/P EST MOD 30 MIN: CPT | Performed by: NURSE PRACTITIONER

## 2020-08-21 PROCEDURE — 81002 URINALYSIS NONAUTO W/O SCOPE: CPT | Performed by: NURSE PRACTITIONER

## 2020-08-21 PROCEDURE — 1160F RVW MEDS BY RX/DR IN RCRD: CPT | Performed by: NURSE PRACTITIONER

## 2020-08-21 PROCEDURE — 3008F BODY MASS INDEX DOCD: CPT | Performed by: NURSE PRACTITIONER

## 2020-08-21 PROCEDURE — 1036F TOBACCO NON-USER: CPT | Performed by: NURSE PRACTITIONER

## 2020-08-21 RX ORDER — NITROFURANTOIN 25; 75 MG/1; MG/1
100 CAPSULE ORAL 2 TIMES DAILY
Qty: 14 CAPSULE | Refills: 0 | Status: SHIPPED | OUTPATIENT
Start: 2020-08-21 | End: 2020-08-28

## 2020-08-21 NOTE — PATIENT INSTRUCTIONS
Get labs done,   Will call with results  Contact ENT and make aware you are interested in further workup for the dizziness and would now like the MRI done  Does not want Neurology referral at this time

## 2020-08-22 LAB — BACTERIA UR CULT: NORMAL

## 2020-08-24 ENCOUNTER — TELEPHONE (OUTPATIENT)
Dept: FAMILY MEDICINE CLINIC | Facility: CLINIC | Age: 70
End: 2020-08-24

## 2020-08-24 NOTE — TELEPHONE ENCOUNTER
Patient states that she went for her labs on Saturday ( the labs still say active and not pending) she said she physically handed them the script    I scheduled a follow up for Friday  Patient would like to know if she should continue or stop the meds since the culture was negative

## 2020-08-27 NOTE — TELEPHONE ENCOUNTER
Spoke with provider on 8/27 for an update  Zina Niño states that its ok she finished her meds  Labs are scanned into system provider aware, and will look at them

## 2020-08-28 ENCOUNTER — OFFICE VISIT (OUTPATIENT)
Dept: FAMILY MEDICINE CLINIC | Facility: CLINIC | Age: 70
End: 2020-08-28
Payer: COMMERCIAL

## 2020-08-28 VITALS
OXYGEN SATURATION: 98 % | HEIGHT: 63 IN | RESPIRATION RATE: 18 BRPM | BODY MASS INDEX: 32.78 KG/M2 | DIASTOLIC BLOOD PRESSURE: 72 MMHG | HEART RATE: 73 BPM | TEMPERATURE: 99.5 F | SYSTOLIC BLOOD PRESSURE: 120 MMHG | WEIGHT: 185 LBS

## 2020-08-28 DIAGNOSIS — R01.1 MURMUR: ICD-10-CM

## 2020-08-28 DIAGNOSIS — E55.9 VITAMIN D DEFICIENCY: Primary | ICD-10-CM

## 2020-08-28 DIAGNOSIS — R00.2 POUNDING HEARTBEAT: ICD-10-CM

## 2020-08-28 DIAGNOSIS — R06.00 DYSPNEA ON EXERTION: ICD-10-CM

## 2020-08-28 DIAGNOSIS — R06.02 SOB (SHORTNESS OF BREATH): ICD-10-CM

## 2020-08-28 PROCEDURE — 99214 OFFICE O/P EST MOD 30 MIN: CPT | Performed by: NURSE PRACTITIONER

## 2020-08-28 PROCEDURE — 1036F TOBACCO NON-USER: CPT | Performed by: NURSE PRACTITIONER

## 2020-08-28 PROCEDURE — 3008F BODY MASS INDEX DOCD: CPT | Performed by: NURSE PRACTITIONER

## 2020-08-28 PROCEDURE — 93000 ELECTROCARDIOGRAM COMPLETE: CPT | Performed by: NURSE PRACTITIONER

## 2020-08-28 PROCEDURE — 1160F RVW MEDS BY RX/DR IN RCRD: CPT | Performed by: NURSE PRACTITIONER

## 2020-08-28 RX ORDER — ERGOCALCIFEROL 1.25 MG/1
50000 CAPSULE ORAL WEEKLY
Qty: 8 CAPSULE | Refills: 0 | Status: SHIPPED | OUTPATIENT
Start: 2020-08-28 | End: 2020-12-04

## 2020-08-28 NOTE — PROGRESS NOTES
[address] [facility]        NAME: Ede Simms is a 72 y o  female  : 1950    MRN: [id number]  DATE: 2020  TIME: 3:49 PM    Assessment and Plan   Vitamin D deficiency [E55 9]  1  Vitamin D deficiency  ergocalciferol (VITAMIN D2) 50,000 units    Vitamin D 25 hydroxy   2  Pounding heartbeat  POCT ECG    Echo complete with contrast if indicated   3  SOB (shortness of breath)  POCT ECG    Echo complete with contrast if indicated   4  Dyspnea on exertion  POCT ECG   5  Murmur       Reviewed labs, all normal except low Vit D level of 26 which increased from 24 2 months ago and patient is taking 2000 units daily  Will start prescription vit d x 8 weeks  EKG- NSR  Will get Echo  Will consider steroid inhaler after echo complete      BMI Counseling: Body mass index is 32 77 kg/m²  The BMI is above normal  Nutrition recommendations include encouraging healthy choices of fruits and vegetables, decreasing fast food intake, limiting drinks that contain sugar, increasing intake of lean protein, reducing intake of saturated and trans fat and reducing intake of cholesterol  Exercise recommendations include exercising 3-5 times per week  Patient Instructions     Patient Instructions   Follow up in 3 months  Chief Complaint     Chief Complaint   Patient presents with    Follow-up     discuss lab          History of Present Illness       Patient here to review labs with ongoing symptoms of SOB,dyspnea on exertion and pounding heart beat  Reports she gets a CT scan of her chest abdomen and pelvis every 6 months since her gallbladder surgery and was told she has some lung damage  CT scan showed mild emphysema done at [facility]  Concerned about her dyspnea on exertion  Vit d level continues to be low even after starting OTC vit D prescription and is out in the sun every day         Review of Systems   Review of Systems   Constitutional: Negative for activity change, appetite change, chills, fatigue and fever  HENT: Negative for congestion, ear pain, nosebleeds, rhinorrhea and sore throat  Eyes: Negative for photophobia, pain, redness and visual disturbance  Respiratory: Negative for cough, shortness of breath and wheezing  Cardiovascular: Negative  Negative for chest pain  Gastrointestinal: Negative  Negative for abdominal pain, constipation, diarrhea and vomiting  Endocrine: Negative  Genitourinary: Negative for difficulty urinating, dysuria and flank pain  Musculoskeletal: Negative  Skin: Negative for color change and rash  Neurological: Negative for dizziness, weakness, numbness and headaches  Hematological: Negative for adenopathy  Psychiatric/Behavioral: Negative for agitation and confusion  The patient is not nervous/anxious  PHQ-9 Depression Screening    PHQ-9:    Frequency of the following problems over the past two weeks:       Little interest or pleasure in doing things:  1 - several days  Feeling down, depressed, or hopeless:  1 - several days  PHQ-2 Score:  2        Current Medications       Current Outpatient Medications:     acetaminophen (TYLENOL) 325 mg tablet, Take by mouth, Disp: , Rfl:     atorvastatin (LIPITOR) 20 mg tablet, Take 1 tablet (20 mg total) by mouth daily, Disp: 90 tablet, Rfl: 1    nitrofurantoin (MACROBID) 100 mg capsule, Take 1 capsule (100 mg total) by mouth 2 (two) times a day for 7 days, Disp: 14 capsule, Rfl: 0    omeprazole (PriLOSEC) 20 mg delayed release capsule, TAKE  (1)  CAPSULE  TWICE DAILY  , Disp: 180 capsule, Rfl: 0    travoprost (TRAVATAN-Z) 0 004 % ophthalmic solution, 1 drop daily at bedtime, Disp: , Rfl:     ergocalciferol (VITAMIN D2) 50,000 units, Take 1 capsule (50,000 Units total) by mouth once a week, Disp: 8 capsule, Rfl: 0    Current Allergies     Allergies as of 08/28/2020 - Reviewed 08/28/2020   Allergen Reaction Noted    Ciprofloxacin  02/03/2020    Clindamycin  02/03/2020    Doxycycline 02/03/2020    Keflex [cephalexin]  02/03/2020    Naproxen Hives and Other (See Comments) 08/03/2017    Sulfa antibiotics Hives, Itching, and Swelling 08/03/2017            The following portions of the patient's history were reviewed and updated as appropriate: allergies, current medications, past family history, past medical history, past social history, past surgical history and problem list      Past Medical History:   Diagnosis Date    Cancer ([address] )     gallbladder    GERD (gastroesophageal reflux disease)     Glaucoma     Hyperlipemia     Murmur     Rheumatic fever        Past Surgical History:   Procedure Laterality Date    CHOLECYSTECTOMY      TONSILLECTOMY  CHILDHOOD       Family History   Problem Relation Age of Onset    Heart attack Mother     Kidney disease Father     Breast cancer Sister 77    No Known Problems Daughter     No Known Problems Maternal Grandmother     No Known Problems Paternal Grandmother     No Known Problems Sister     No Known Problems Maternal Aunt     No Known Problems Maternal Aunt     No Known Problems Maternal Aunt     No Known Problems Paternal Aunt     No Known Problems Paternal Aunt          Medications have been verified  Objective   /72   Pulse 73   Temp 99 5 °F (37 5 °C)   Resp 18   Ht 5' 3" (1 6 m)   Wt 83 9 kg (185 lb)   SpO2 98%   BMI 32 77 kg/m²        Physical Exam     Physical Exam  Vitals signs and nursing note reviewed  Constitutional:       General: She is not in acute distress  Appearance: Normal appearance  She is well-developed  She is not ill-appearing  Eyes:      General: Lids are normal    Cardiovascular:      Rate and Rhythm: Normal rate and regular rhythm  Heart sounds: Normal heart sounds, S1 normal and S2 normal  No murmur  No friction rub  No gallop  Pulmonary:      Effort: Pulmonary effort is normal  No respiratory distress  Breath sounds: Normal breath sounds   No decreased breath sounds or wheezing  Musculoskeletal: Normal range of motion  General: No tenderness or deformity  Skin:     General: Skin is warm  Findings: No erythema or rash  Neurological:      Mental Status: She is alert and oriented to person, place, and time  Psychiatric:         Behavior: Behavior normal  Behavior is cooperative  Thought Content:  Thought content normal

## 2020-09-15 DIAGNOSIS — E78.5 HYPERLIPIDEMIA, UNSPECIFIED HYPERLIPIDEMIA TYPE: ICD-10-CM

## 2020-09-15 RX ORDER — ATORVASTATIN CALCIUM 20 MG/1
20 TABLET, FILM COATED ORAL DAILY
Qty: 90 TABLET | Refills: 1 | Status: SHIPPED | OUTPATIENT
Start: 2020-09-15 | End: 2021-03-08 | Stop reason: SDUPTHER

## 2020-09-15 NOTE — TELEPHONE ENCOUNTER
Received call from patient requesting refill of Atorvastatin 20mg to be sent to [address]  Please advise

## 2020-09-24 ENCOUNTER — HOSPITAL ENCOUNTER (OUTPATIENT)
Dept: NON INVASIVE DIAGNOSTICS | Facility: HOSPITAL | Age: 70
Discharge: HOME | End: 2020-09-24
Payer: COMMERCIAL

## 2020-09-24 DIAGNOSIS — R06.02 SOB (SHORTNESS OF BREATH): ICD-10-CM

## 2020-09-24 DIAGNOSIS — R00.2 POUNDING HEARTBEAT: ICD-10-CM

## 2020-09-24 PROCEDURE — 93306 TTE W/DOPPLER COMPLETE: CPT

## 2020-09-24 PROCEDURE — 93306 TTE W/DOPPLER COMPLETE: CPT | Performed by: INTERNAL MEDICINE

## 2020-09-25 DIAGNOSIS — J44.9 CHRONIC OBSTRUCTIVE PULMONARY DISEASE, UNSPECIFIED COPD TYPE (HCC): Primary | ICD-10-CM

## 2020-09-29 ENCOUNTER — TELEPHONE (OUTPATIENT)
Dept: FAMILY MEDICINE CLINIC | Facility: CLINIC | Age: 70
End: 2020-09-29

## 2020-10-04 ENCOUNTER — OFFICE VISIT (OUTPATIENT)
Dept: URGENT CARE | Facility: CLINIC | Age: 70
End: 2020-10-04
Payer: COMMERCIAL

## 2020-10-04 VITALS
DIASTOLIC BLOOD PRESSURE: 64 MMHG | WEIGHT: 185.2 LBS | RESPIRATION RATE: 18 BRPM | HEART RATE: 62 BPM | OXYGEN SATURATION: 98 % | TEMPERATURE: 98.6 F | HEIGHT: 63 IN | BODY MASS INDEX: 32.82 KG/M2 | SYSTOLIC BLOOD PRESSURE: 146 MMHG

## 2020-10-04 DIAGNOSIS — K12.2 UVULITIS: Primary | ICD-10-CM

## 2020-10-04 DIAGNOSIS — J02.9 SORE THROAT: ICD-10-CM

## 2020-10-04 LAB — S PYO AG THROAT QL: NEGATIVE

## 2020-10-04 PROCEDURE — 87880 STREP A ASSAY W/OPTIC: CPT | Performed by: FAMILY MEDICINE

## 2020-10-04 PROCEDURE — S9088 SERVICES PROVIDED IN URGENT: HCPCS | Performed by: FAMILY MEDICINE

## 2020-10-04 PROCEDURE — 99214 OFFICE O/P EST MOD 30 MIN: CPT | Performed by: FAMILY MEDICINE

## 2020-10-04 RX ORDER — AZITHROMYCIN 250 MG/1
TABLET, FILM COATED ORAL
Qty: 6 TABLET | Refills: 0 | Status: SHIPPED | OUTPATIENT
Start: 2020-10-04 | End: 2020-10-04

## 2020-10-04 RX ORDER — AZITHROMYCIN 250 MG/1
TABLET, FILM COATED ORAL
Qty: 6 TABLET | Refills: 0 | Status: SHIPPED | OUTPATIENT
Start: 2020-10-04 | End: 2020-10-08

## 2020-11-03 ENCOUNTER — VBI (OUTPATIENT)
Dept: ADMINISTRATIVE | Facility: OTHER | Age: 70
End: 2020-11-03

## 2020-12-02 ENCOUNTER — TELEPHONE (OUTPATIENT)
Dept: FAMILY MEDICINE CLINIC | Facility: CLINIC | Age: 70
End: 2020-12-02

## 2020-12-02 DIAGNOSIS — Z12.31 ENCOUNTER FOR SCREENING MAMMOGRAM FOR MALIGNANT NEOPLASM OF BREAST: Primary | ICD-10-CM

## 2020-12-04 ENCOUNTER — LAB (OUTPATIENT)
Dept: LAB | Facility: CLINIC | Age: 70
End: 2020-12-04
Payer: COMMERCIAL

## 2020-12-04 ENCOUNTER — OFFICE VISIT (OUTPATIENT)
Dept: FAMILY MEDICINE CLINIC | Facility: CLINIC | Age: 70
End: 2020-12-04
Payer: COMMERCIAL

## 2020-12-04 VITALS
DIASTOLIC BLOOD PRESSURE: 72 MMHG | TEMPERATURE: 96.9 F | BODY MASS INDEX: 32.14 KG/M2 | WEIGHT: 181.4 LBS | OXYGEN SATURATION: 98 % | RESPIRATION RATE: 20 BRPM | SYSTOLIC BLOOD PRESSURE: 120 MMHG | HEIGHT: 63 IN | HEART RATE: 52 BPM

## 2020-12-04 DIAGNOSIS — E55.9 VITAMIN D DEFICIENCY: ICD-10-CM

## 2020-12-04 DIAGNOSIS — R42 DIZZINESS: ICD-10-CM

## 2020-12-04 DIAGNOSIS — B00.1 COLD SORE: ICD-10-CM

## 2020-12-04 DIAGNOSIS — Z00.00 ENCOUNTER FOR MEDICARE ANNUAL WELLNESS EXAM: Primary | ICD-10-CM

## 2020-12-04 DIAGNOSIS — E78.1 PURE HYPERTRIGLYCERIDEMIA: ICD-10-CM

## 2020-12-04 DIAGNOSIS — Z12.31 ENCOUNTER FOR SCREENING MAMMOGRAM FOR MALIGNANT NEOPLASM OF BREAST: ICD-10-CM

## 2020-12-04 DIAGNOSIS — R53.83 OTHER FATIGUE: ICD-10-CM

## 2020-12-04 DIAGNOSIS — R25.2 CRAMPING OF HANDS: ICD-10-CM

## 2020-12-04 DIAGNOSIS — Z12.11 SCREENING FOR COLON CANCER: ICD-10-CM

## 2020-12-04 PROBLEM — R06.00 DOE (DYSPNEA ON EXERTION): Status: ACTIVE | Noted: 2020-10-13

## 2020-12-04 PROBLEM — R06.09 DOE (DYSPNEA ON EXERTION): Status: ACTIVE | Noted: 2020-10-13

## 2020-12-04 LAB
25(OH)D3 SERPL-MCNC: 42.4 NG/ML (ref 30–100)
ALBUMIN SERPL BCP-MCNC: 3.8 G/DL (ref 3.5–5)
ALP SERPL-CCNC: 112 U/L (ref 46–116)
ALT SERPL W P-5'-P-CCNC: 17 U/L (ref 12–78)
ANION GAP SERPL CALCULATED.3IONS-SCNC: 4 MMOL/L (ref 4–13)
AST SERPL W P-5'-P-CCNC: 15 U/L (ref 5–45)
BASOPHILS # BLD AUTO: 0.05 THOUSANDS/ΜL (ref 0–0.1)
BASOPHILS NFR BLD AUTO: 1 % (ref 0–1)
BILIRUB SERPL-MCNC: 0.66 MG/DL (ref 0.2–1)
BUN SERPL-MCNC: 19 MG/DL (ref 5–25)
CALCIUM SERPL-MCNC: 9.1 MG/DL (ref 8.3–10.1)
CHLORIDE SERPL-SCNC: 109 MMOL/L (ref 100–108)
CHOLEST SERPL-MCNC: 133 MG/DL (ref 50–200)
CO2 SERPL-SCNC: 28 MMOL/L (ref 21–32)
CREAT SERPL-MCNC: 0.86 MG/DL (ref 0.6–1.3)
EOSINOPHIL # BLD AUTO: 0.26 THOUSAND/ΜL (ref 0–0.61)
EOSINOPHIL NFR BLD AUTO: 4 % (ref 0–6)
ERYTHROCYTE [DISTWIDTH] IN BLOOD BY AUTOMATED COUNT: 13.1 % (ref 11.6–15.1)
FERRITIN SERPL-MCNC: 110 NG/ML (ref 8–388)
GFR SERPL CREATININE-BSD FRML MDRD: 69 ML/MIN/1.73SQ M
GLUCOSE P FAST SERPL-MCNC: 101 MG/DL (ref 65–99)
HCT VFR BLD AUTO: 44.3 % (ref 34.8–46.1)
HDLC SERPL-MCNC: 40 MG/DL
HGB BLD-MCNC: 14.5 G/DL (ref 11.5–15.4)
IMM GRANULOCYTES # BLD AUTO: 0.02 THOUSAND/UL (ref 0–0.2)
IMM GRANULOCYTES NFR BLD AUTO: 0 % (ref 0–2)
IRON SATN MFR SERPL: 19 %
IRON SERPL-MCNC: 58 UG/DL (ref 50–170)
LDLC SERPL CALC-MCNC: 78 MG/DL (ref 0–100)
LYMPHOCYTES # BLD AUTO: 1.89 THOUSANDS/ΜL (ref 0.6–4.47)
LYMPHOCYTES NFR BLD AUTO: 28 % (ref 14–44)
MAGNESIUM SERPL-MCNC: 2.6 MG/DL (ref 1.6–2.6)
MCH RBC QN AUTO: 31.8 PG (ref 26.8–34.3)
MCHC RBC AUTO-ENTMCNC: 32.7 G/DL (ref 31.4–37.4)
MCV RBC AUTO: 97 FL (ref 82–98)
MONOCYTES # BLD AUTO: 0.46 THOUSAND/ΜL (ref 0.17–1.22)
MONOCYTES NFR BLD AUTO: 7 % (ref 4–12)
NEUTROPHILS # BLD AUTO: 4.17 THOUSANDS/ΜL (ref 1.85–7.62)
NEUTS SEG NFR BLD AUTO: 60 % (ref 43–75)
NONHDLC SERPL-MCNC: 93 MG/DL
NRBC BLD AUTO-RTO: 0 /100 WBCS
PLATELET # BLD AUTO: 241 THOUSANDS/UL (ref 149–390)
PMV BLD AUTO: 9.6 FL (ref 8.9–12.7)
POTASSIUM SERPL-SCNC: 4.7 MMOL/L (ref 3.5–5.3)
PROT SERPL-MCNC: 7.8 G/DL (ref 6.4–8.2)
RBC # BLD AUTO: 4.56 MILLION/UL (ref 3.81–5.12)
SODIUM SERPL-SCNC: 141 MMOL/L (ref 136–145)
TIBC SERPL-MCNC: 300 UG/DL (ref 250–450)
TRIGL SERPL-MCNC: 76 MG/DL
TSH SERPL DL<=0.05 MIU/L-ACNC: 1.9 UIU/ML (ref 0.36–3.74)
VIT B12 SERPL-MCNC: 344 PG/ML (ref 100–900)
WBC # BLD AUTO: 6.85 THOUSAND/UL (ref 4.31–10.16)

## 2020-12-04 PROCEDURE — 84443 ASSAY THYROID STIM HORMONE: CPT

## 2020-12-04 PROCEDURE — 83550 IRON BINDING TEST: CPT

## 2020-12-04 PROCEDURE — 3288F FALL RISK ASSESSMENT DOCD: CPT | Performed by: NURSE PRACTITIONER

## 2020-12-04 PROCEDURE — 36415 COLL VENOUS BLD VENIPUNCTURE: CPT

## 2020-12-04 PROCEDURE — 1036F TOBACCO NON-USER: CPT | Performed by: NURSE PRACTITIONER

## 2020-12-04 PROCEDURE — G0439 PPPS, SUBSEQ VISIT: HCPCS | Performed by: NURSE PRACTITIONER

## 2020-12-04 PROCEDURE — 82728 ASSAY OF FERRITIN: CPT

## 2020-12-04 PROCEDURE — 80053 COMPREHEN METABOLIC PANEL: CPT

## 2020-12-04 PROCEDURE — 3725F SCREEN DEPRESSION PERFORMED: CPT | Performed by: NURSE PRACTITIONER

## 2020-12-04 PROCEDURE — 82306 VITAMIN D 25 HYDROXY: CPT

## 2020-12-04 PROCEDURE — 1160F RVW MEDS BY RX/DR IN RCRD: CPT | Performed by: NURSE PRACTITIONER

## 2020-12-04 PROCEDURE — 3008F BODY MASS INDEX DOCD: CPT | Performed by: NURSE PRACTITIONER

## 2020-12-04 PROCEDURE — 83540 ASSAY OF IRON: CPT

## 2020-12-04 PROCEDURE — 80061 LIPID PANEL: CPT

## 2020-12-04 PROCEDURE — 83735 ASSAY OF MAGNESIUM: CPT

## 2020-12-04 PROCEDURE — 85025 COMPLETE CBC W/AUTO DIFF WBC: CPT

## 2020-12-04 PROCEDURE — 1101F PT FALLS ASSESS-DOCD LE1/YR: CPT | Performed by: NURSE PRACTITIONER

## 2020-12-04 PROCEDURE — 86618 LYME DISEASE ANTIBODY: CPT

## 2020-12-04 PROCEDURE — 99212 OFFICE O/P EST SF 10 MIN: CPT | Performed by: NURSE PRACTITIONER

## 2020-12-04 PROCEDURE — 1125F AMNT PAIN NOTED PAIN PRSNT: CPT | Performed by: NURSE PRACTITIONER

## 2020-12-04 PROCEDURE — 82607 VITAMIN B-12: CPT

## 2020-12-04 PROCEDURE — 1170F FXNL STATUS ASSESSED: CPT | Performed by: NURSE PRACTITIONER

## 2020-12-04 RX ORDER — VALACYCLOVIR HYDROCHLORIDE 1 G/1
1000 TABLET, FILM COATED ORAL 2 TIMES DAILY
Qty: 4 TABLET | Refills: 0 | Status: SHIPPED | OUTPATIENT
Start: 2020-12-04 | End: 2021-09-21

## 2020-12-04 RX ORDER — ERGOCALCIFEROL 1.25 MG/1
CAPSULE ORAL
Qty: 8 CAPSULE | Refills: 0 | Status: SHIPPED | OUTPATIENT
Start: 2020-12-04 | End: 2021-02-04

## 2020-12-05 LAB — B BURGDOR IGG+IGM SER-ACNC: 38

## 2020-12-07 ENCOUNTER — TELEPHONE (OUTPATIENT)
Dept: ADMINISTRATIVE | Facility: OTHER | Age: 70
End: 2020-12-07

## 2020-12-09 ENCOUNTER — HOSPITAL ENCOUNTER (OUTPATIENT)
Dept: MAMMOGRAPHY | Facility: HOSPITAL | Age: 70
Discharge: HOME | End: 2020-12-09
Payer: COMMERCIAL

## 2020-12-09 VITALS — BODY MASS INDEX: 32.07 KG/M2 | HEIGHT: 63 IN | WEIGHT: 181 LBS

## 2020-12-09 DIAGNOSIS — Z12.31 ENCOUNTER FOR SCREENING MAMMOGRAM FOR MALIGNANT NEOPLASM OF BREAST: ICD-10-CM

## 2020-12-09 PROCEDURE — 77063 BREAST TOMOSYNTHESIS BI: CPT

## 2020-12-09 PROCEDURE — 77067 SCR MAMMO BI INCL CAD: CPT

## 2021-01-28 ENCOUNTER — APPOINTMENT (EMERGENCY)
Dept: NON INVASIVE DIAGNOSTICS | Facility: HOSPITAL | Age: 71
End: 2021-01-28
Payer: COMMERCIAL

## 2021-01-28 ENCOUNTER — OFFICE VISIT (OUTPATIENT)
Dept: FAMILY MEDICINE CLINIC | Facility: CLINIC | Age: 71
End: 2021-01-28
Payer: COMMERCIAL

## 2021-01-28 ENCOUNTER — TELEPHONE (OUTPATIENT)
Dept: FAMILY MEDICINE CLINIC | Facility: CLINIC | Age: 71
End: 2021-01-28

## 2021-01-28 ENCOUNTER — APPOINTMENT (EMERGENCY)
Dept: RADIOLOGY | Facility: HOSPITAL | Age: 71
End: 2021-01-28
Payer: COMMERCIAL

## 2021-01-28 ENCOUNTER — APPOINTMENT (EMERGENCY)
Dept: CT IMAGING | Facility: HOSPITAL | Age: 71
End: 2021-01-28
Payer: COMMERCIAL

## 2021-01-28 ENCOUNTER — HOSPITAL ENCOUNTER (EMERGENCY)
Facility: HOSPITAL | Age: 71
Discharge: HOME | End: 2021-01-28
Attending: EMERGENCY MEDICINE | Admitting: EMERGENCY MEDICINE
Payer: COMMERCIAL

## 2021-01-28 VITALS
TEMPERATURE: 99.1 F | RESPIRATION RATE: 18 BRPM | SYSTOLIC BLOOD PRESSURE: 146 MMHG | DIASTOLIC BLOOD PRESSURE: 93 MMHG | WEIGHT: 179 LBS | BODY MASS INDEX: 31.71 KG/M2 | HEIGHT: 63 IN | HEART RATE: 79 BPM | OXYGEN SATURATION: 95 %

## 2021-01-28 VITALS
OXYGEN SATURATION: 97 % | HEIGHT: 63 IN | HEART RATE: 75 BPM | SYSTOLIC BLOOD PRESSURE: 120 MMHG | WEIGHT: 177.6 LBS | BODY MASS INDEX: 31.47 KG/M2 | DIASTOLIC BLOOD PRESSURE: 72 MMHG | TEMPERATURE: 99.1 F | RESPIRATION RATE: 20 BRPM

## 2021-01-28 DIAGNOSIS — R07.9 CHEST PAIN, UNSPECIFIED TYPE: ICD-10-CM

## 2021-01-28 DIAGNOSIS — R06.02 SOB (SHORTNESS OF BREATH): Primary | ICD-10-CM

## 2021-01-28 DIAGNOSIS — R09.81 NASAL CONGESTION: ICD-10-CM

## 2021-01-28 DIAGNOSIS — U07.1 COVID-19: Primary | ICD-10-CM

## 2021-01-28 DIAGNOSIS — M79.605 PAIN OF LEFT LOWER EXTREMITY: ICD-10-CM

## 2021-01-28 LAB
ALBUMIN SERPL BCP-MCNC: 4.4 G/DL (ref 3.5–5.7)
ALP SERPL-CCNC: 75 U/L (ref 55–165)
ALT SERPL W P-5'-P-CCNC: 14 U/L (ref 7–52)
ANION GAP SERPL CALCULATED.3IONS-SCNC: 10 MMOL/L (ref 4–13)
AST SERPL W P-5'-P-CCNC: 21 U/L (ref 13–39)
BASOPHILS # BLD AUTO: 0 THOUSANDS/ΜL (ref 0–0.1)
BASOPHILS NFR BLD AUTO: 0 % (ref 0–2)
BILIRUB SERPL-MCNC: 0.7 MG/DL (ref 0.2–1)
BUN SERPL-MCNC: 15 MG/DL (ref 7–25)
CALCIUM SERPL-MCNC: 8.8 MG/DL (ref 8.6–10.5)
CHLORIDE SERPL-SCNC: 101 MMOL/L (ref 98–107)
CO2 SERPL-SCNC: 27 MMOL/L (ref 21–31)
CREAT SERPL-MCNC: 0.84 MG/DL (ref 0.6–1.2)
D DIMER PPP FEU-MCNC: 0.61 UG/ML FEU
EOSINOPHIL # BLD AUTO: 0 THOUSAND/ΜL (ref 0–0.61)
EOSINOPHIL NFR BLD AUTO: 0 % (ref 0–5)
ERYTHROCYTE [DISTWIDTH] IN BLOOD BY AUTOMATED COUNT: 13.3 % (ref 11.5–14.5)
FLUAV RNA RESP QL NAA+PROBE: NEGATIVE
FLUBV RNA RESP QL NAA+PROBE: NEGATIVE
GFR SERPL CREATININE-BSD FRML MDRD: 71 ML/MIN/1.73SQ M
GLUCOSE SERPL-MCNC: 90 MG/DL (ref 65–99)
HCT VFR BLD AUTO: 43.9 % (ref 42–47)
HGB BLD-MCNC: 14.7 G/DL (ref 12–16)
LYMPHOCYTES # BLD AUTO: 1 THOUSANDS/ΜL (ref 0.6–4.47)
LYMPHOCYTES NFR BLD AUTO: 21 % (ref 21–51)
MCH RBC QN AUTO: 31.4 PG (ref 26–34)
MCHC RBC AUTO-ENTMCNC: 33.6 G/DL (ref 31–37)
MCV RBC AUTO: 94 FL (ref 81–99)
MONOCYTES # BLD AUTO: 0.5 THOUSAND/ΜL (ref 0.17–1.22)
MONOCYTES NFR BLD AUTO: 10 % (ref 2–12)
NEUTROPHILS # BLD AUTO: 3.3 THOUSANDS/ΜL (ref 1.4–6.5)
NEUTS SEG NFR BLD AUTO: 68 % (ref 42–75)
PLATELET # BLD AUTO: 175 THOUSANDS/UL (ref 149–390)
PMV BLD AUTO: 7.4 FL (ref 8.6–11.7)
POTASSIUM SERPL-SCNC: 3.7 MMOL/L (ref 3.5–5.5)
PROT SERPL-MCNC: 8 G/DL (ref 6.4–8.9)
RBC # BLD AUTO: 4.7 MILLION/UL (ref 3.9–5.2)
RSV RNA RESP QL NAA+PROBE: NEGATIVE
SARS-COV-2 RNA RESP QL NAA+PROBE: POSITIVE
SODIUM SERPL-SCNC: 138 MMOL/L (ref 134–143)
TROPONIN I SERPL-MCNC: <0.03 NG/ML
WBC # BLD AUTO: 4.8 THOUSAND/UL (ref 4.8–10.8)

## 2021-01-28 PROCEDURE — 85379 FIBRIN DEGRADATION QUANT: CPT | Performed by: EMERGENCY MEDICINE

## 2021-01-28 PROCEDURE — 99214 OFFICE O/P EST MOD 30 MIN: CPT | Performed by: NURSE PRACTITIONER

## 2021-01-28 PROCEDURE — 71045 X-RAY EXAM CHEST 1 VIEW: CPT

## 2021-01-28 PROCEDURE — 85025 COMPLETE CBC W/AUTO DIFF WBC: CPT | Performed by: EMERGENCY MEDICINE

## 2021-01-28 PROCEDURE — 84484 ASSAY OF TROPONIN QUANT: CPT | Performed by: EMERGENCY MEDICINE

## 2021-01-28 PROCEDURE — 93971 EXTREMITY STUDY: CPT | Performed by: SURGERY

## 2021-01-28 PROCEDURE — 93005 ELECTROCARDIOGRAM TRACING: CPT

## 2021-01-28 PROCEDURE — 87040 BLOOD CULTURE FOR BACTERIA: CPT | Performed by: EMERGENCY MEDICINE

## 2021-01-28 PROCEDURE — 71275 CT ANGIOGRAPHY CHEST: CPT

## 2021-01-28 PROCEDURE — G1004 CDSM NDSC: HCPCS

## 2021-01-28 PROCEDURE — 3008F BODY MASS INDEX DOCD: CPT | Performed by: NURSE PRACTITIONER

## 2021-01-28 PROCEDURE — 99284 EMERGENCY DEPT VISIT MOD MDM: CPT

## 2021-01-28 PROCEDURE — 80053 COMPREHEN METABOLIC PANEL: CPT | Performed by: EMERGENCY MEDICINE

## 2021-01-28 PROCEDURE — 36415 COLL VENOUS BLD VENIPUNCTURE: CPT | Performed by: EMERGENCY MEDICINE

## 2021-01-28 PROCEDURE — 99285 EMERGENCY DEPT VISIT HI MDM: CPT | Performed by: EMERGENCY MEDICINE

## 2021-01-28 PROCEDURE — 93971 EXTREMITY STUDY: CPT

## 2021-01-28 PROCEDURE — 0241U HB NFCT DS VIR RESP RNA 4 TRGT: CPT | Performed by: EMERGENCY MEDICINE

## 2021-01-28 RX ORDER — TIOTROPIUM BROMIDE INHALATION SPRAY 3.12 UG/1
SPRAY, METERED RESPIRATORY (INHALATION) DAILY
COMMUNITY
Start: 2020-12-08 | End: 2022-03-10

## 2021-01-28 RX ORDER — ALBUTEROL SULFATE 90 UG/1
2 AEROSOL, METERED RESPIRATORY (INHALATION) EVERY 6 HOURS PRN
COMMUNITY
Start: 2020-12-08

## 2021-01-28 RX ORDER — TIOTROPIUM BROMIDE INHALATION SPRAY 3.12 UG/1
SPRAY, METERED RESPIRATORY (INHALATION)
COMMUNITY
Start: 2020-12-08

## 2021-01-28 RX ADMIN — IOHEXOL 100 ML: 350 INJECTION, SOLUTION INTRAVENOUS at 15:26

## 2021-01-28 NOTE — ED PROVIDER NOTES
History  Chief Complaint   Patient presents with    Cough     sent by PMD for cough, short of breath and tightness in chest with pain upper back which started last night  78-year-old female presents emergency department complaining of cough  Patient was seen earlier at her primary care physician's office and was concerned for the potential of left lower extremity pain and cough and subjective shortness of breath  The patient denies any definitive fever chills or productive sputum, but has increased cough and feels overall poorly  She denies any nausea or vomiting but does note that she has mild chest tightness  Symptoms have been present for weeks  Prior to Admission Medications   Prescriptions Last Dose Informant Patient Reported? Taking? Spiriva Respimat 2 5 MCG/ACT AERS inhaler   Yes No   acetaminophen (TYLENOL) 325 mg tablet  Self Yes No   Sig: Take by mouth   albuterol (PROVENTIL HFA,VENTOLIN HFA) 90 mcg/act inhaler   Yes No   Sig: Inhale 2 puffs every 6 (six) hours as needed   atorvastatin (LIPITOR) 20 mg tablet   No No   Sig: Take 1 tablet (20 mg total) by mouth daily   ergocalciferol (VITAMIN D2) 50,000 units   No No   Sig: TAKE ONE CAPSULE ONCE WEEKLY  Patient not taking: Reported on 2021   omeprazole (PriLOSEC) 20 mg delayed release capsule   No No   Sig: TAKE  (1)  CAPSULE  TWICE DAILY  tiotropium (Spiriva Respimat) 2 5 MCG/ACT AERS inhaler   Yes No   Sig: Inhale daily   travoprost (TRAVATAN-Z) 0 004 % ophthalmic solution  Self Yes No   Si drop daily at bedtime   valACYclovir (VALTREX) 1,000 mg tablet   No No   Sig: Take 1 tablet (1,000 mg total) by mouth 2 (two) times a day for 1 day   Patient not taking: Reported on 2021      Facility-Administered Medications: None       Past Medical History:   Diagnosis Date    Cancer [facility])     gallbladder    GERD (gastroesophageal reflux disease)     Glaucoma     History of gallbladder cancer     Approx   3 years ago   [address] [place] [address] Hyperlipemia     Murmur     Rheumatic fever        Past Surgical History:   Procedure Laterality Date    CHOLECYSTECTOMY      TONSILLECTOMY  CHILDHOOD       Family History   Problem Relation Age of Onset    Heart attack Mother     Kidney disease Father     Breast cancer Sister 77    No Known Problems Daughter     No Known Problems Maternal Grandmother     No Known Problems Paternal Grandmother     No Known Problems Sister     No Known Problems Maternal Aunt     No Known Problems Maternal Aunt     No Known Problems Maternal Aunt     No Known Problems Paternal Aunt     No Known Problems Paternal Aunt      I have reviewed and agree with the history as documented  E-Cigarette/Vaping    E-Cigarette Use Never User      E-Cigarette/Vaping Substances    Nicotine No     THC No     CBD No     Flavoring No     Other No     Unknown No      Social History     Tobacco Use    Smoking status: Former Smoker    Smokeless tobacco: Never Used   Substance Use Topics    Alcohol use: Never     Frequency: Never    Drug use: Never       Review of Systems   Constitutional: Positive for activity change and fatigue  Respiratory: Positive for cough, chest tightness and shortness of breath  Cardiovascular: Negative for chest pain  Gastrointestinal: Negative for abdominal distention and abdominal pain  Genitourinary: Negative for flank pain  Musculoskeletal: Positive for myalgias  Negative for back pain  Neurological: Negative for dizziness and light-headedness  Psychiatric/Behavioral: Negative for agitation  Physical Exam  Physical Exam  Constitutional:       Appearance: Normal appearance  HENT:      Head: Normocephalic  Right Ear: External ear normal       Left Ear: External ear normal       Nose: Nose normal       Mouth/Throat:      Mouth: Mucous membranes are moist    Eyes:      Pupils: Pupils are equal, round, and reactive to light     Neck:      Musculoskeletal: Normal range of motion and neck supple  Cardiovascular:      Rate and Rhythm: Normal rate and regular rhythm  Heart sounds: No murmur  Pulmonary:      Effort: Pulmonary effort is normal       Breath sounds: Normal breath sounds  Abdominal:      General: Abdomen is flat  Palpations: Abdomen is soft  Musculoskeletal: Normal range of motion  Comments: Mild tenderness to the left calf with squeezing  Patient notes there is more significant pain at the superior aspect of the gastrocnemius   Skin:     General: Skin is warm  Capillary Refill: Capillary refill takes less than 2 seconds  Neurological:      General: No focal deficit present  Mental Status: She is alert and oriented to person, place, and time  Psychiatric:         Mood and Affect: Mood normal          Vital Signs  ED Triage Vitals [01/28/21 1318]   Temperature Pulse Respirations Blood Pressure SpO2   99 1 °F (37 3 °C) 79 18 146/93 95 %      Temp Source Heart Rate Source Patient Position - Orthostatic VS BP Location FiO2 (%)   Temporal Monitor Lying Left arm --      Pain Score       6           Vitals:    01/28/21 1318   BP: 146/93   Pulse: 79   Patient Position - Orthostatic VS: Lying         Visual Acuity      ED Medications  Medications   iohexol (OMNIPAQUE) 350 MG/ML injection (SINGLE-DOSE) 100 mL (100 mL Intravenous Given 1/28/21 1526)       Diagnostic Studies  Results Reviewed     Procedure Component Value Units Date/Time    Blood culture #2 [[id number]] Collected: 01/28/21 1351    Lab Status: Preliminary result Specimen: Blood from Arm, Left Updated: 01/28/21 2302     Blood Culture Received in Microbiology Lab  Culture in Progress  Blood culture #1 [[id number]] Collected: 01/28/21 1351    Lab Status: Preliminary result Specimen: Blood from Arm, Left Updated: 01/28/21 2302     Blood Culture Received in Microbiology Lab  Culture in Progress      COVID19, Influenza A/B, RSV PCR, [facility] [[id number]]  (Abnormal) Collected: 01/28/21 1351    Lab Status: Final result Specimen: Nares from Nasopharyngeal Swab Updated: 01/28/21 1451     SARS-CoV-2 Positive     INFLUENZA A PCR Negative     INFLUENZA B PCR Negative     RSV PCR Negative    Narrative: This test has been authorized by FDA under an EUA (Emergency Use Assay) for use by authorized laboratories  Clinical caution and judgement should be used with the interpretation of these results with consideration of the clinical impression and other laboratory testing  Testing reported as "Positive" or "Negative" has been proven to be accurate according to standard laboratory validation requirements  All testing is performed with control materials showing appropriate reactivity at standard intervals      Troponin I [[id number]]  (Normal) Collected: 01/28/21 1351    Lab Status: Final result Specimen: Blood from Arm, Left Updated: 01/28/21 1430     Troponin I <0 03 ng/mL     Comprehensive metabolic panel [[id number]] Collected: 01/28/21 1351    Lab Status: Final result Specimen: Blood from Arm, Left Updated: 01/28/21 1428     Sodium 138 mmol/L      Potassium 3 7 mmol/L      Chloride 101 mmol/L      CO2 27 mmol/L      ANION GAP 10 mmol/L      BUN 15 mg/dL      Creatinine 0 84 mg/dL      Glucose 90 mg/dL      Calcium 8 8 mg/dL      AST 21 U/L      ALT 14 U/L      Alkaline Phosphatase 75 U/L      Total Protein 8 0 g/dL      Albumin 4 4 g/dL      Total Bilirubin 0 70 mg/dL      eGFR 71 ml/min/1 73sq m     Narrative:      Magen guidelines for Chronic Kidney Disease (CKD):     Stage 1 with normal or high GFR (GFR > 90 mL/min/1 73 square meters)    Stage 2 Mild CKD (GFR = 60-89 mL/min/1 73 square meters)    Stage 3A Moderate CKD (GFR = 45-59 mL/min/1 73 square meters)    Stage 3B Moderate CKD (GFR = 30-44 mL/min/1 73 square meters)    Stage 4 Severe CKD (GFR = 15-29 mL/min/1 73 square meters)    Stage 5 End Stage CKD (GFR <15 mL/min/1 73 square meters)  Note: GFR calculation is accurate only with a steady state creatinine    D-Dimer [[id number]]  (Abnormal) Collected: 01/28/21 1351    Lab Status: Final result Specimen: Blood from Arm, Left Updated: 01/28/21 1425     D-Dimer, Quant 0 61 ug/ml FEU     CBC and differential [[id number]]  (Abnormal) Collected: 01/28/21 1351    Lab Status: Final result Specimen: Blood from Arm, Left Updated: 01/28/21 1413     WBC 4 80 Thousand/uL      RBC 4 70 Million/uL      Hemoglobin 14 7 g/dL      Hematocrit 43 9 %      MCV 94 fL      MCH 31 4 pg      MCHC 33 6 g/dL      RDW 13 3 %      MPV 7 4 fL      Platelets 453 Thousands/uL      Neutrophils Relative 68 %      Lymphocytes Relative 21 %      Monocytes Relative 10 %      Eosinophils Relative 0 %      Basophils Relative 0 %      Neutrophils Absolute 3 30 Thousands/µL      Lymphocytes Absolute 1 00 Thousands/µL      Monocytes Absolute 0 50 Thousand/µL      Eosinophils Absolute 0 00 Thousand/µL      Basophils Absolute 0 00 Thousands/µL                  CTA ED chest PE study   Final Result by Yuri Jay MD (01/28 1121)      Mild dependent opacity in the right lower lobe in keeping with with mild pneumonia or hypoventilatory change  Mild nonspecific right parahilar adenopathy  The study was marked in [facility] for immediate notification  Workstation performed: [id number]         VAS lower limb venous duplex study, unilateral/limited   Final Result by Barrett Bermudez MD (01/28 3934)      XR chest 1 view portable   ED Interpretation by Nayana Celaya DO (01/28 1288)   No definitive acute pulmonary pathology  Final Result by Margret Lino MD (01/28 1525)      No acute cardiopulmonary disease  In the setting of clinically suspected/proven COVID-19, this plain film appearance does not contain findings that raise concern for viral pneumonia such as COVID-19, but does not rule out this diagnosis                             Workstation performed: [id number] Procedures  ECG 12 Lead Documentation Only    Date/Time: 1/28/2021 2:42 PM  Performed by: Alexa Swanson DO  Authorized by: Alexa Swanson DO     ECG reviewed by me, the ED Provider: yes    Patient location:  ED  Comments:      Sinus rhythm at 73 per with normal axis no acute ST or T-wave changes noted  ED Course  ED Course as of Jan 29 0044   Thu Jan 28, 2021   1438 D-Dimer, Quant(!): 0 61   1453 SARS-COV-2(!): Positive   1454 Ultrasound tech has let me know that ultrasound of left lower extremity appears to be negative for DVT  0 Being that chest x-ray is negative for acute evidence of pneumonia, patient is likely showing COVID findings on CT scan  White count is normal so we will recommend follow-up with primary care [phone] Patient would not be a candidate for antibody infusion due to onset of symptoms is greater than 7 days  Patient notes she has been sick for over 2 weeks  1607 Chest CT was reviewed with the patient regarding lymph nodes which are likely benign, but need appropriate follow-up    Patient agrees                                    Mushtaq Gross' Criteria for PE      Most Recent Value   Wells' Criteria for PE   Clinical signs and symptoms of DVT  3 Filed at: 01/28/2021 1455   PE is primary diagnosis or equally likely  0 Filed at: 01/28/2021 1455   HR >100  0 Filed at: 01/28/2021 1455   Immobilization at least 3 days or Surgery in the previous 4 weeks  1 5 Filed at: 01/28/2021 1455   Previous, objectively diagnosed PE or DVT  0 Filed at: 01/28/2021 1455   Hemoptysis  0 Filed at: 01/28/2021 1455   Malignancy with treatment within 6 months or palliative  0 Filed at: 01/28/2021 1455   Wells' Criteria Total  4 5 Filed at: 01/28/2021 1455                MDM    Disposition  Final diagnoses:   COVID-19     Time reflects when diagnosis was documented in both MDM as applicable and the Disposition within this note     Time User Action Codes Description Comment    1/28/2021  9:24 PM Angelica Moss Add [U07 1] COVID-19       ED Disposition     ED Disposition Condition Date/Time Comment    Discharge Stable Thu Jan 28, 2021  4:07 PM Noah Pablo discharge to home/self care  Follow-up Information     Follow up With Specialties Details Why [address], [address], Nurse Practitioner In 2 days  [address] [facility]  [phone]            Discharge Medication List as of 1/28/2021  4:07 PM      CONTINUE these medications which have NOT CHANGED    Details   acetaminophen (TYLENOL) 325 mg tablet Take by mouth, Historical Med      albuterol (PROVENTIL HFA,VENTOLIN HFA) 90 mcg/act inhaler Inhale 2 puffs every 6 (six) hours as needed, Starting Tue 12/8/2020, Historical Med      atorvastatin (LIPITOR) 20 mg tablet Take 1 tablet (20 mg total) by mouth daily, Starting Tue 9/15/2020, Normal      ergocalciferol (VITAMIN D2) 50,000 units TAKE ONE CAPSULE ONCE WEEKLY , Normal      omeprazole (PriLOSEC) 20 mg delayed release capsule TAKE  (1)  CAPSULE  TWICE DAILY , Normal      !! Spiriva Respimat 2 5 MCG/ACT AERS inhaler Starting Tue 12/8/2020, Historical Med      !! tiotropium (Spiriva Respimat) 2 5 MCG/ACT AERS inhaler Inhale daily, Starting Tue 12/8/2020, Until Wed 12/8/2021, Historical Med      travoprost (TRAVATAN-Z) 0 004 % ophthalmic solution 1 drop daily at bedtime, Historical Med      valACYclovir (VALTREX) 1,000 mg tablet Take 1 tablet (1,000 mg total) by mouth 2 (two) times a day for 1 day, Starting Fri 12/4/2020, Until Thu 1/28/2021, Normal       !! - Potential duplicate medications found  Please discuss with provider  No discharge procedures on file      PDMP Review     None          ED Provider  Electronically Signed by           Hemant Estrada DO  01/29/21 0045

## 2021-01-28 NOTE — DISCHARGE INSTRUCTIONS
Drink plenty of fluids  Use over-the-counter cough and cold medicines as needed as well as Motrin or Tylenol to control symptoms  Recheck with your family doctor in 2-3 days  It is very important that you follow-up with your family doctor in 24-48 hours, and return to the ER for any new, concerning, or worsening issues  Please start taking:    · Vitamin D3 2000 IU orally daily    · Vitamin C 1g orally every 12 hours  · A Multivitamin Daily    Consider an online purchase of a pulse-oximeter monitor, to read your oxygen levels on your finger      · Recommend returning if SpO2 drops below 90%, or below 94% if:    - Your age is greater than or equals 73  - You are overweight  - You have cancer  - You have kidney disease  - You are immunocompromised  - You have chronic heart or lung disease  - You're diabetic, or   - You are a smoker

## 2021-01-28 NOTE — PROGRESS NOTES
[address] [facility]        NAME: Tomer Min is a 72 y o  female  : 1950    MRN: [id number]  DATE: 2021  TIME: 12:24 PM    Assessment and Plan   SOB (shortness of breath) [R06 02]  1  SOB (shortness of breath)  Transfer to other facility    CANCELED: D-dimer, quantitative   2  Pain of left lower extremity  Transfer to other facility    CANCELED: D-dimer, quantitative    CANCELED: VAS lower limb venous duplex study, unilateral/limited   3  Chest pain, unspecified type  Transfer to other facility   4  Nasal congestion       1  SOB (shortness of breath)  Concerned for PE with her history of DVT's and new onset Left LE pain and varicose veins  Patient has been more sedentary  Has been non compliant with inhalers but reports they did not help at all and has not taken then since last year  - Transfer to other facility    2  Pain of left lower extremity  New left LE pain  ED referral for possible DVT  - Transfer to other facility    3  Chest pain, unspecified type  intermittent pain  Patient very non specific about details, not able to provider detailed history and more forgetful than normal today  - Transfer to other facility    4  Nasal congestion  Continues with 3 weeks of congestion and cough  Patient Instructions     Patient Instructions    Go to the ED for further evaluation of SOB, CP, and Left LE pain  Chief Complaint     Chief Complaint   Patient presents with    Cough     pt co cough for three weeks, headache, stuffy nose  History of Present Illness       Patient here with c/o cough x 3 weeks, nasal congestion, post nasal drip, clearing throat,SOB  Has tried nasal sprays  Coughing a lot, reports some back pain and chest pain with coughing  acrossed back last night, now having posterior left shoulder pain  Has not not tried any home treatment for this  Having some chest pain intermittently, no known worsening or alleviaing factors  History of blood clots in the past  DVT during chemo 2018  Reports one other time but cannot recall dates  Cough  This is a new problem  Episode onset: about 3 weeks  The problem has been unchanged  The cough is productive of sputum  Nothing aggravates the symptoms  She has tried nothing for the symptoms  Review of Systems   Review of Systems   Respiratory: Positive for cough  PHQ-9 Depression Screening    PHQ-9:   Frequency of the following problems over the past two weeks:      Little interest or pleasure in doing things: 0 - not at all  Feeling down, depressed, or hopeless: 0 - not at all  Trouble falling or staying asleep, or sleeping too much: 0 - not at all  Feeling tired or having little energy: 0 - not at all  Poor appetite or overeatin - not at all  Feeling bad about yourself - or that you are a failure or have let yourself or your family down: 0 - not at all  Trouble concentrating on things, such as reading the newspaper or watching television: 0 - not at all  Moving or speaking so slowly that other people could have noticed  Or the opposite - being so fidgety or restless that you have been moving around a lot more than usual: 0 - not at all  Thoughts that you would be better off dead, or of hurting yourself in some way: 0 - not at all  PHQ-2 Score: 0  PHQ-9 Score: 0        Current Medications       Current Outpatient Medications:     albuterol (PROVENTIL HFA,VENTOLIN HFA) 90 mcg/act inhaler, Inhale 2 puffs every 6 (six) hours as needed, Disp: , Rfl:     atorvastatin (LIPITOR) 20 mg tablet, Take 1 tablet (20 mg total) by mouth daily, Disp: 90 tablet, Rfl: 1    omeprazole (PriLOSEC) 20 mg delayed release capsule, TAKE  (1)  CAPSULE  TWICE DAILY  , Disp: 180 capsule, Rfl: 0    tiotropium (Spiriva Respimat) 2 5 MCG/ACT AERS inhaler, Inhale daily, Disp: , Rfl:     travoprost (TRAVATAN-Z) 0 004 % ophthalmic solution, 1 drop daily at bedtime, Disp: , Rfl:     acetaminophen (TYLENOL) 325 mg tablet, Take by mouth, Disp: , Rfl:     ergocalciferol (VITAMIN D2) 50,000 units, TAKE ONE CAPSULE ONCE WEEKLY  (Patient not taking: Reported on 1/28/2021), Disp: 8 capsule, Rfl: 0    Spiriva Respimat 2 5 MCG/ACT AERS inhaler, , Disp: , Rfl:     valACYclovir (VALTREX) 1,000 mg tablet, Take 1 tablet (1,000 mg total) by mouth 2 (two) times a day for 1 day (Patient not taking: Reported on 1/28/2021), Disp: 4 tablet, Rfl: 0    Current Allergies     Allergies as of 01/28/2021 - Reviewed 01/28/2021   Allergen Reaction Noted    Ciprofloxacin  02/03/2020    Clindamycin  02/03/2020    Doxycycline  02/03/2020    Keflex [cephalexin]  02/03/2020    Naproxen Hives and Other (See Comments) 08/03/2017    Nsaids  10/04/2020    Penicillins  10/04/2020    Sulfa antibiotics Hives, Itching, and Swelling 08/03/2017            The following portions of the patient's history were reviewed and updated as appropriate: allergies, current medications, past family history, past medical history, past social history, past surgical history and problem list      Past Medical History:   Diagnosis Date    Cancer ([facility] [address] )     gallbladder    GERD (gastroesophageal reflux disease)     Glaucoma     History of gallbladder cancer     Approx  3 years ago    Hyperlipemia     Murmur     Rheumatic fever        Past Surgical History:   Procedure Laterality Date    CHOLECYSTECTOMY      TONSILLECTOMY  CHILDHOOD       Family History   Problem Relation Age of Onset    Heart attack Mother     Kidney disease Father     Breast cancer Sister 77    No Known Problems Daughter     No Known Problems Maternal Grandmother     No Known Problems Paternal Grandmother     No Known Problems Sister     No Known Problems Maternal Aunt     No Known Problems Maternal Aunt     No Known Problems Maternal Aunt     No Known Problems Paternal Aunt     No Known Problems Paternal Aunt          Medications have been verified          Objective /72   Pulse 75   Temp 99 1 °F (37 3 °C)   Resp 20   Ht 5' 3" (1 6 m)   Wt 80 6 kg (177 lb 9 6 oz)   SpO2 97%   BMI 31 46 kg/m²        Physical Exam     Physical Exam  Vitals signs and nursing note reviewed  Constitutional:       General: She is not in acute distress  Appearance: Normal appearance  She is well-developed  She is not ill-appearing  Eyes:      General: Lids are normal    Cardiovascular:      Rate and Rhythm: Normal rate and regular rhythm  Heart sounds: Normal heart sounds, S1 normal and S2 normal  No murmur  Comments: Left LE new varicose veins in the last 3 days  Right LE with previous varicose veins  Pulmonary:      Effort: Pulmonary effort is normal  No respiratory distress  Breath sounds: Normal breath sounds  No decreased breath sounds or wheezing  Musculoskeletal: Normal range of motion  General: No tenderness or deformity  Skin:     General: Skin is warm  Findings: No erythema or rash  Neurological:      Mental Status: She is alert and oriented to person, place, and time  Psychiatric:         Behavior: Behavior normal  Behavior is cooperative  Thought Content:  Thought content normal

## 2021-01-28 NOTE — TELEPHONE ENCOUNTER
Pt called  She has had a cough for 3 weeks and it is getting worse  She has pain in her back  And thick mucus    She does not feel she has had any exposure     No fever  Mild headache  Can taste and smell  Always has SOB    Please advise    Phone: [phone] [facility]

## 2021-01-28 NOTE — TELEPHONE ENCOUNTER
Called and spoke with patient  Since her cough has been going on for three weeks, I offered her a virtual visit to discuss  She is scheduled for a telephone visit with you at 11:30  She does not have access to a smart phone and only has her home phone

## 2021-01-29 ENCOUNTER — TELEPHONE (OUTPATIENT)
Dept: FAMILY MEDICINE CLINIC | Facility: CLINIC | Age: 71
End: 2021-01-29

## 2021-01-29 DIAGNOSIS — R09.81 NASAL CONGESTION: ICD-10-CM

## 2021-01-29 DIAGNOSIS — R05.9 COUGH: Primary | ICD-10-CM

## 2021-01-29 LAB
ATRIAL RATE: 73 BPM
P AXIS: 60 DEGREES
PR INTERVAL: 162 MS
QRS AXIS: 50 DEGREES
QRSD INTERVAL: 70 MS
QT INTERVAL: 370 MS
QTC INTERVAL: 407 MS
T WAVE AXIS: 41 DEGREES
VENTRICULAR RATE: 73 BPM

## 2021-01-29 PROCEDURE — 93010 ELECTROCARDIOGRAM REPORT: CPT | Performed by: INTERNAL MEDICINE

## 2021-01-29 RX ORDER — BENZONATATE 100 MG/1
100 CAPSULE ORAL 3 TIMES DAILY PRN
Qty: 20 CAPSULE | Refills: 0 | Status: SHIPPED | OUTPATIENT
Start: 2021-01-29 | End: 2021-02-04

## 2021-01-29 RX ORDER — PREDNISONE 20 MG/1
TABLET ORAL
Qty: 9 TABLET | Refills: 0 | Status: SHIPPED | OUTPATIENT
Start: 2021-01-29 | End: 2021-02-04

## 2021-01-29 NOTE — TELEPHONE ENCOUNTER
Attempted to contact patient after speaking with you to schedule a follow up for early next week (Tues/Wed)  No answer  Left message for her to call the office back so we can schedule an in office visit

## 2021-01-29 NOTE — TELEPHONE ENCOUNTER
Patient called back had all testing done at the hospital and wanted to know besides all these vitamins is there anything she can take for the Cough and runny nose   If something is ordered it can be sent to [facility] in [place]

## 2021-02-02 LAB
BACTERIA BLD CULT: NORMAL
BACTERIA BLD CULT: NORMAL

## 2021-02-03 NOTE — TELEPHONE ENCOUNTER
Patient called back  She is scheduled for tomorrow with you at 9:30  She states she is still a little SOB, but not severe   Advised pt if it does get worse she should be evaluated at either urgent care or the ED

## 2021-02-04 ENCOUNTER — OFFICE VISIT (OUTPATIENT)
Dept: FAMILY MEDICINE CLINIC | Facility: CLINIC | Age: 71
End: 2021-02-04
Payer: COMMERCIAL

## 2021-02-04 VITALS
DIASTOLIC BLOOD PRESSURE: 80 MMHG | HEART RATE: 70 BPM | SYSTOLIC BLOOD PRESSURE: 116 MMHG | RESPIRATION RATE: 18 BRPM | OXYGEN SATURATION: 98 % | TEMPERATURE: 97.7 F

## 2021-02-04 DIAGNOSIS — R09.81 CONGESTION OF NASAL SINUS: ICD-10-CM

## 2021-02-04 DIAGNOSIS — J34.89 SINUS PRESSURE: ICD-10-CM

## 2021-02-04 DIAGNOSIS — U07.1 COVID-19 VIRUS INFECTION: Primary | ICD-10-CM

## 2021-02-04 PROCEDURE — 99213 OFFICE O/P EST LOW 20 MIN: CPT | Performed by: NURSE PRACTITIONER

## 2021-02-04 RX ORDER — AZITHROMYCIN 250 MG/1
TABLET, FILM COATED ORAL
Qty: 6 TABLET | Refills: 0 | Status: SHIPPED | OUTPATIENT
Start: 2021-02-04 | End: 2021-02-08

## 2021-02-04 NOTE — PROGRESS NOTES
[address] [place] [address] Primary Care        NAME: Adam Santana is a 72 y o  female  : 1950    MRN: [id number]  DATE: 2021  TIME: 10:09 AM    Assessment and Plan   COVID-19 virus infection [U07 1]  1  COVID-19 virus infection  azithromycin (ZITHROMAX) 250 mg tablet   2  Congestion of nasal sinus  azithromycin (ZITHROMAX) 250 mg tablet   3  Sinus pressure  azithromycin (ZITHROMAX) 250 mg tablet     1  COVID-19 virus infection  Symptoms for the last 4 weeks  Tested positive when sent to the ED last week  - azithromycin (ZITHROMAX) 250 mg tablet; Take 2 tablets today then 1 tablet daily x 4 days  Dispense: 6 tablet; Refill: 0    2  Congestion of nasal sinus    - azithromycin (ZITHROMAX) 250 mg tablet; Take 2 tablets today then 1 tablet daily x 4 days  Dispense: 6 tablet; Refill: 0    3  Sinus pressure    - azithromycin (ZITHROMAX) 250 mg tablet; Take 2 tablets today then 1 tablet daily x 4 days  Dispense: 6 tablet; Refill: 0      Patient Instructions     Patient Instructions   Drink plenty of fluids  Take Zithromax as prescribed  Follow up in 1 week  Take mucinex as needed  Chief Complaint     Chief Complaint   Patient presents with    COVID-19     Pt c/o productive cough, SOB, burning mouth, feeling fidgity          History of Present Illness        Patient here for follow up visit after testing positive for COVID  Has been having symptoms for the last 4 weeks  Reports thick mucous, burning in mouth, some SOB, chest tightness  Denies fevers chills, sweats  Continues to feel foggy and just not right, has been feeling fidgity since  Starting the prednisone last week  Review of Systems   Review of Systems   Constitutional: Positive for fatigue  HENT: Positive for congestion, postnasal drip and rhinorrhea  Negative for sore throat          +burning in mouth   Respiratory: Positive for cough, chest tightness and shortness of breath  Negative for wheezing      Cardiovascular: Negative  Musculoskeletal: Positive for myalgias  Negative for arthralgias  Neurological: Positive for light-headedness and headaches  Negative for dizziness and weakness  Psychiatric/Behavioral: Negative  PHQ-9 Depression Screening    PHQ-9:   Frequency of the following problems over the past two weeks:      Little interest or pleasure in doing things: 2 - more than half the days  Feeling down, depressed, or hopeless: 3 - nearly every day  Trouble falling or staying asleep, or sleeping too much: 0 - not at all  Feeling tired or having little energy: 2 - more than half the days  Poor appetite or overeatin - more than half the days  Feeling bad about yourself - or that you are a failure or have let yourself or your family down: 1 - several days  Trouble concentrating on things, such as reading the newspaper or watching television: 3 - nearly every day  Moving or speaking so slowly that other people could have noticed  Or the opposite - being so fidgety or restless that you have been moving around a lot more than usual: 1 - several days  Thoughts that you would be better off dead, or of hurting yourself in some way: 0 - not at all  PHQ-2 Score: 5  PHQ-9 Score: 14        Current Medications       Current Outpatient Medications:     acetaminophen (TYLENOL) 325 mg tablet, Take by mouth, Disp: , Rfl:     albuterol (PROVENTIL HFA,VENTOLIN HFA) 90 mcg/act inhaler, Inhale 2 puffs every 6 (six) hours as needed, Disp: , Rfl:     atorvastatin (LIPITOR) 20 mg tablet, Take 1 tablet (20 mg total) by mouth daily, Disp: 90 tablet, Rfl: 1    omeprazole (PriLOSEC) 20 mg delayed release capsule, TAKE  (1)  CAPSULE  TWICE DAILY  , Disp: 180 capsule, Rfl: 0    predniSONE 20 mg tablet, Take 1 tablet (20 mg total) by mouth 2 (two) times a day with meals for 3 days, THEN 1 tablet (20 mg total) daily for 3 days  , Disp: 9 tablet, Rfl: 0    Pseudoephedrine-guaiFENesin (MUCINEX D PO), Take by mouth, Disp: , Rfl:    Spiriva Respimat 2 5 MCG/ACT AERS inhaler, , Disp: , Rfl:     tiotropium (Spiriva Respimat) 2 5 MCG/ACT AERS inhaler, Inhale daily, Disp: , Rfl:     travoprost (TRAVATAN-Z) 0 004 % ophthalmic solution, 1 drop daily at bedtime, Disp: , Rfl:     azithromycin (ZITHROMAX) 250 mg tablet, Take 2 tablets today then 1 tablet daily x 4 days, Disp: 6 tablet, Rfl: 0    benzonatate (TESSALON PERLES) 100 mg capsule, Take 1 capsule (100 mg total) by mouth 3 (three) times a day as needed for cough (Patient not taking: Reported on 2/4/2021), Disp: 20 capsule, Rfl: 0    ergocalciferol (VITAMIN D2) 50,000 units, TAKE ONE CAPSULE ONCE WEEKLY  (Patient not taking: Reported on 1/28/2021), Disp: 8 capsule, Rfl: 0    valACYclovir (VALTREX) 1,000 mg tablet, Take 1 tablet (1,000 mg total) by mouth 2 (two) times a day for 1 day (Patient not taking: Reported on 1/28/2021), Disp: 4 tablet, Rfl: 0    Current Allergies     Allergies as of 02/04/2021 - Reviewed 02/04/2021   Allergen Reaction Noted    Ciprofloxacin  02/03/2020    Clindamycin  02/03/2020    Doxycycline  02/03/2020    Keflex [cephalexin]  02/03/2020    Naproxen Hives and Other (See Comments) 08/03/2017    Nsaids  10/04/2020    Penicillins  10/04/2020    Sulfa antibiotics Hives, Itching, and Swelling 08/03/2017            The following portions of the patient's history were reviewed and updated as appropriate: allergies, current medications, past family history, past medical history, past social history, past surgical history and problem list      Past Medical History:   Diagnosis Date    Cancer ([address] )     gallbladder    GERD (gastroesophageal reflux disease)     Glaucoma     History of gallbladder cancer     Approx   3 years ago    Hyperlipemia     Murmur     Rheumatic fever        Past Surgical History:   Procedure Laterality Date    CHOLECYSTECTOMY      TONSILLECTOMY  CHILDHOOD       Family History   Problem Relation Age of Onset    Heart attack Mother    [place] Urrutia Kidney disease Father     Breast cancer Sister 77    No Known Problems Daughter     No Known Problems Maternal Grandmother     No Known Problems Paternal Grandmother     No Known Problems Sister     No Known Problems Maternal Aunt     No Known Problems Maternal Aunt     No Known Problems Maternal Aunt     No Known Problems Paternal Aunt     No Known Problems Paternal Aunt          Medications have been verified  Objective   /80   Pulse 70   Temp 97 7 °F (36 5 °C) (Tympanic)   Resp 18   SpO2 98%        Physical Exam     Physical Exam  Vitals signs and nursing note reviewed  Constitutional:       General: She is not in acute distress  Appearance: Normal appearance  She is well-developed  She is not ill-appearing or diaphoretic  HENT:      Head: Normocephalic and atraumatic  Nose: Nose normal  No laceration, mucosal edema or rhinorrhea  Mouth/Throat:      Pharynx: Uvula midline  Cardiovascular:      Rate and Rhythm: Normal rate and regular rhythm  Heart sounds: Normal heart sounds  No murmur  No friction rub  No gallop  Pulmonary:      Effort: Pulmonary effort is normal  No bradypnea, accessory muscle usage or respiratory distress  Breath sounds: No stridor  No decreased breath sounds or wheezing  Skin:     General: Skin is warm  Capillary Refill: Capillary refill takes less than 2 seconds  Coloration: Skin is not pale  Findings: No erythema or rash  Neurological:      Mental Status: She is alert and oriented to person, place, and time  Psychiatric:         Behavior: Behavior normal  Behavior is cooperative  Thought Content:  Thought content normal          Judgment: Judgment normal

## 2021-02-11 ENCOUNTER — OFFICE VISIT (OUTPATIENT)
Dept: FAMILY MEDICINE CLINIC | Facility: CLINIC | Age: 71
End: 2021-02-11
Payer: COMMERCIAL

## 2021-02-11 VITALS
HEART RATE: 62 BPM | OXYGEN SATURATION: 97 % | BODY MASS INDEX: 31.15 KG/M2 | SYSTOLIC BLOOD PRESSURE: 120 MMHG | TEMPERATURE: 97 F | WEIGHT: 175.8 LBS | HEIGHT: 63 IN | RESPIRATION RATE: 20 BRPM | DIASTOLIC BLOOD PRESSURE: 76 MMHG

## 2021-02-11 DIAGNOSIS — L60.3 BRITTLE NAILS: ICD-10-CM

## 2021-02-11 DIAGNOSIS — U07.1 COVID-19 VIRUS INFECTION: Primary | ICD-10-CM

## 2021-02-11 DIAGNOSIS — J44.9 COPD, GROUP A, BY GOLD 2017 CLASSIFICATION (HCC): ICD-10-CM

## 2021-02-11 DIAGNOSIS — M54.9 ACUTE UPPER BACK PAIN: ICD-10-CM

## 2021-02-11 PROCEDURE — 3288F FALL RISK ASSESSMENT DOCD: CPT | Performed by: NURSE PRACTITIONER

## 2021-02-11 PROCEDURE — 1101F PT FALLS ASSESS-DOCD LE1/YR: CPT | Performed by: NURSE PRACTITIONER

## 2021-02-11 PROCEDURE — 99214 OFFICE O/P EST MOD 30 MIN: CPT | Performed by: NURSE PRACTITIONER

## 2021-02-11 RX ORDER — METHYLPREDNISOLONE 4 MG/1
TABLET ORAL
Qty: 21 EACH | Refills: 0 | Status: SHIPPED | OUTPATIENT
Start: 2021-02-11 | End: 2021-03-06 | Stop reason: ALTCHOICE

## 2021-02-11 RX ORDER — GUAIFENESIN, PSEUDOEPHEDRINE HYDROCHLORIDE 600; 60 MG/1; MG/1
1 TABLET, EXTENDED RELEASE ORAL 2 TIMES DAILY PRN
Qty: 20 TABLET | Refills: 0 | Status: SHIPPED | OUTPATIENT
Start: 2021-02-11 | End: 2021-04-06

## 2021-02-11 NOTE — PROGRESS NOTES
[address] [place] [address] Primary Care        NAME: Guerita James is a 72 y o  female  : 1950    MRN: [id number]  DATE: 2021  TIME: 10:26 AM    Assessment and Plan   COVID-19 virus infection [U07 1]  1  COVID-19 virus infection  pseudoephedrine-guaifenesin (MUCINEX D)  MG per tablet    methylPREDNISolone 4 MG tablet therapy pack   2  COPD, group A, by GOLD 2017 classification ([facility] )     3  Brittle nails     4  Acute upper back pain       1  COVID-19 virus infection  Patient continues with congestion  Has not taken anything for this in the last 2 days  Wanted to come into office first   Last dose of Zithromax was 2 days ago  - pseudoephedrine-guaifenesin (MUCINEX D)  MG per tablet; Take 1 tablet by mouth 2 (two) times a day as needed for congestion or cough  Dispense: 20 tablet; Refill: 0  - methylPREDNISolone 4 MG tablet therapy pack; Use as directed on package  Dispense: 21 each; Refill: 0    2  COPD, group A, by GOLD 2017 classification ([facility] [address] )      3  Brittle nails  Encouraged to take a multivitamin and decrease hand  use, daily moisturizer 2 times a day  4  Acute upper back pain  Patient has been less active and diagnosed with COVID in the last 4 weeks  Suspect this is due to inactivity and viral infection  Recommend RICE treatment  Patient Instructions     Patient Instructions    Once you take last Vit D prescription supplement, I want you to start taking Vit D 1000 units daily to help maintain your Vit D level  Antonio Haywood You can get this at the pharmacy in the vitamin section  Take Medrol dose pack as prescribed  Take Mucinex as needed for congestion/mucous up to 2 times a day  Chief Complaint     Chief Complaint   Patient presents with    Follow-up     covid patient states she feels a litte better, reports tinnitis, runny nose, SOB, and mucous         History of Present Illness       Patient here for follow up on COVID virus   Reports improvement in symptoms since last week  Took zithromax x 5 days, last dose 2 days ago  Reports a lot of ongoing mucous in her throat, stopped taking mucinex d while she was taking the antibiotic because she doesn't like to mix medications  Reports tinnitus in her left ear for the last few weeks  ear for the last few weeks  Reports ongoing brittle nails for the last month and also some mild upper back pain worse on the right than the left  Review of Systems   Review of Systems   Constitutional: Negative for chills, fatigue and fever  HENT: Positive for congestion, postnasal drip, sinus pain and tinnitus (left ear)  Negative for ear pain  Respiratory: Negative for chest tightness and shortness of breath  Cardiovascular: Negative for chest pain and palpitations  Gastrointestinal: Negative for abdominal pain, diarrhea, nausea and vomiting  Musculoskeletal: Positive for back pain  Neurological: Negative  Negative for light-headedness and headaches  Psychiatric/Behavioral: Negative  PHQ-9 Depression Screening    PHQ-9:   Frequency of the following problems over the past two weeks:      Little interest or pleasure in doing things: 0 - not at all  Feeling down, depressed, or hopeless: 0 - not at all  Trouble falling or staying asleep, or sleeping too much: 0 - not at all  Feeling tired or having little energy: 0 - not at all  Poor appetite or overeatin - not at all  Feeling bad about yourself - or that you are a failure or have let yourself or your family down: 0 - not at all  Trouble concentrating on things, such as reading the newspaper or watching television: 0 - not at all  Moving or speaking so slowly that other people could have noticed   Or the opposite - being so fidgety or restless that you have been moving around a lot more than usual: 0 - not at all  Thoughts that you would be better off dead, or of hurting yourself in some way: 0 - not at all  PHQ-2 Score: 0  PHQ-9 Score: 0 Current Medications       Current Outpatient Medications:     acetaminophen (TYLENOL) 325 mg tablet, Take by mouth, Disp: , Rfl:     albuterol (PROVENTIL HFA,VENTOLIN HFA) 90 mcg/act inhaler, Inhale 2 puffs every 6 (six) hours as needed, Disp: , Rfl:     atorvastatin (LIPITOR) 20 mg tablet, Take 1 tablet (20 mg total) by mouth daily, Disp: 90 tablet, Rfl: 1    omeprazole (PriLOSEC) 20 mg delayed release capsule, TAKE  (1)  CAPSULE  TWICE DAILY  , Disp: 180 capsule, Rfl: 0    Spiriva Respimat 2 5 MCG/ACT AERS inhaler, , Disp: , Rfl:     tiotropium (Spiriva Respimat) 2 5 MCG/ACT AERS inhaler, Inhale daily, Disp: , Rfl:     travoprost (TRAVATAN-Z) 0 004 % ophthalmic solution, 1 drop daily at bedtime, Disp: , Rfl:     methylPREDNISolone 4 MG tablet therapy pack, Use as directed on package, Disp: 21 each, Rfl: 0    pseudoephedrine-guaifenesin (MUCINEX D)  MG per tablet, Take 1 tablet by mouth 2 (two) times a day as needed for congestion or cough, Disp: 20 tablet, Rfl: 0    valACYclovir (VALTREX) 1,000 mg tablet, Take 1 tablet (1,000 mg total) by mouth 2 (two) times a day for 1 day (Patient not taking: Reported on 1/28/2021), Disp: 4 tablet, Rfl: 0    Current Allergies     Allergies as of 02/11/2021 - Reviewed 02/11/2021   Allergen Reaction Noted    Ciprofloxacin  02/03/2020    Clindamycin  02/03/2020    Doxycycline  02/03/2020    Keflex [cephalexin]  02/03/2020    Naproxen Hives and Other (See Comments) 08/03/2017    Nsaids  10/04/2020    Penicillins  10/04/2020    Sulfa antibiotics Hives, Itching, and Swelling 08/03/2017            The following portions of the patient's history were reviewed and updated as appropriate: allergies, current medications, past family history, past medical history, past social history, past surgical history and problem list      Past Medical History:   Diagnosis Date    Cancer ([facility] )     gallbladder    GERD (gastroesophageal reflux disease)     Glaucoma     History of gallbladder cancer     Approx  3 years ago    Hyperlipemia     Murmur     Rheumatic fever        Past Surgical History:   Procedure Laterality Date    CHOLECYSTECTOMY      TONSILLECTOMY  CHILDHOOD       Family History   Problem Relation Age of Onset    Heart attack Mother     Kidney disease Father     Breast cancer Sister 77    No Known Problems Daughter     No Known Problems Maternal Grandmother     No Known Problems Paternal Grandmother     No Known Problems Sister     No Known Problems Maternal Aunt     No Known Problems Maternal Aunt     No Known Problems Maternal Aunt     No Known Problems Paternal Aunt     No Known Problems Paternal Aunt          Medications have been verified  Objective   /76   Pulse 62   Temp (!) 97 °F (36 1 °C)   Resp 20   Ht 5' 3" (1 6 m)   Wt 79 7 kg (175 lb 12 8 oz)   SpO2 97%   BMI 31 14 kg/m²        Physical Exam     Physical Exam  Vitals signs and nursing note reviewed  Constitutional:       General: She is not in acute distress  Appearance: Normal appearance  She is well-developed  She is not ill-appearing  HENT:      Head: Normocephalic and atraumatic  Right Ear: Tympanic membrane, ear canal and external ear normal  There is no impacted cerumen  Left Ear: Tympanic membrane, ear canal and external ear normal  There is no impacted cerumen  Nose: Nose normal  No congestion or rhinorrhea  Mouth/Throat:      Lips: Pink  Mouth: Mucous membranes are moist       Pharynx: No pharyngeal swelling, oropharyngeal exudate or posterior oropharyngeal erythema  Tonsils: No tonsillar exudate  Eyes:      General: Lids are normal    Cardiovascular:      Rate and Rhythm: Normal rate and regular rhythm  Heart sounds: Normal heart sounds, S1 normal and S2 normal  No murmur  No friction rub  No gallop  Pulmonary:      Effort: Pulmonary effort is normal  No respiratory distress        Breath sounds: Normal breath sounds  No decreased breath sounds or wheezing  Musculoskeletal: Normal range of motion  General: No tenderness or deformity  Comments: + mild ttp to upper back  ROM intact  Skin:     General: Skin is warm  Findings: No ecchymosis, erythema or rash  Comments: +dry nails noted  Neurological:      Mental Status: She is alert and oriented to person, place, and time  Psychiatric:         Behavior: Behavior normal  Behavior is cooperative  Thought Content:  Thought content normal

## 2021-02-11 NOTE — PATIENT INSTRUCTIONS
Once you take last Vit D prescription supplement, I want you to start taking Vit D 1000 units daily to help maintain your Vit D level  Mimi Potter You can get this at the pharmacy in the vitamin section  Take Medrol dose pack as prescribed  Take Mucinex as needed for congestion/mucous up to 2 times a day  Take a multivitamin everyday

## 2021-03-06 ENCOUNTER — OFFICE VISIT (OUTPATIENT)
Dept: URGENT CARE | Facility: CLINIC | Age: 71
End: 2021-03-06
Payer: COMMERCIAL

## 2021-03-06 ENCOUNTER — APPOINTMENT (OUTPATIENT)
Dept: RADIOLOGY | Facility: CLINIC | Age: 71
End: 2021-03-06
Payer: COMMERCIAL

## 2021-03-06 VITALS
RESPIRATION RATE: 18 BRPM | HEART RATE: 57 BPM | OXYGEN SATURATION: 98 % | SYSTOLIC BLOOD PRESSURE: 148 MMHG | DIASTOLIC BLOOD PRESSURE: 70 MMHG | HEIGHT: 63 IN | BODY MASS INDEX: 31.89 KG/M2 | TEMPERATURE: 98.3 F | WEIGHT: 180 LBS

## 2021-03-06 DIAGNOSIS — R21 RASH: ICD-10-CM

## 2021-03-06 DIAGNOSIS — M79.672 LEFT FOOT PAIN: Primary | ICD-10-CM

## 2021-03-06 DIAGNOSIS — M79.672 LEFT FOOT PAIN: ICD-10-CM

## 2021-03-06 PROCEDURE — S9088 SERVICES PROVIDED IN URGENT: HCPCS | Performed by: PHYSICIAN ASSISTANT

## 2021-03-06 PROCEDURE — 99213 OFFICE O/P EST LOW 20 MIN: CPT | Performed by: PHYSICIAN ASSISTANT

## 2021-03-06 PROCEDURE — 73630 X-RAY EXAM OF FOOT: CPT

## 2021-03-06 RX ORDER — PREDNISONE 10 MG/1
TABLET ORAL
Qty: 21 TABLET | Refills: 0 | Status: SHIPPED | OUTPATIENT
Start: 2021-03-06 | End: 2021-03-12

## 2021-03-06 NOTE — PATIENT INSTRUCTIONS
X-rays negative for fracture  Likely gout due to presentation and exam findings  She has tolerated prednisone in the past we will use that now  She can ice the foot and elevate  Follow-up with PCP  May benefit from preventative medicine such as allopurinol if she continues with flare ups  Rash appears to be mild eczema and dry skin  She can use hydrocortisone over-the-counter for that  Gout   AMBULATORY CARE:   Gout  is a form of arthritis that causes severe joint pain and stiffness  Acute gout pain starts suddenly, gets worse quickly, and stops on its own  Acute gout can become chronic and cause permanent damage to your joints  Seek care immediately if:   · You have severe pain in one or more of your joints that you cannot tolerate  · You have a fever or redness that spreads beyond the joint area  Call your doctor if:   · You have new symptoms, such as a rash, after you start gout treatment  · Your joint pain and swelling do not go away, even after treatment  · You are not urinating as much or as often as you usually do  · You have trouble taking your gout medicines  · You have questions or concerns about your condition or care  Stages of gout:   · Hyperuricemia  starts with high levels of uric acid  Hyperuricemia is not gout, but it increases your risk for gout  You may have no symptoms at this stage, and it usually does not need treatment  · Acute gouty arthritis  starts with a sudden attack of pain and swelling, usually in 1 joint  The attack may last from a few days to 2 weeks  · Intercritical gout  is the time between attacks  You may go months or years without another attack  You will not have joint pain or stiffness, but this does not mean your gout is cured  You will still need treatment to prevent chronic gout  · Chronic tophaceous gout  develops if gout is not treated  Large amounts of uric acid crystals, called tophi, collect around your joints   The crystals can destroy or deform the joints  Gout attacks occur more often, and last hours to weeks  More than 1 joint may be painful and swollen  At this stage, gout symptoms do not go away on their own  Medicines: You may need any of the following:  · Prescription pain medicine  may be given  Ask your healthcare provider how to take this medicine safely  Some prescription pain medicines contain acetaminophen  Do not take other medicines that contain acetaminophen without talking to your healthcare provider  Too much acetaminophen may cause liver damage  Prescription pain medicine may cause constipation  Ask your healthcare provider how to prevent or treat constipation  · NSAIDs , such as ibuprofen, help decrease swelling, pain, and fever  This medicine is available with or without a doctor's order  NSAIDs can cause stomach bleeding or kidney problems in certain people  If you take blood thinner medicine, always ask your healthcare provider if NSAIDs are safe for you  Always read the medicine label and follow directions  · Gout medicine  decreases joint pain and swelling  It may also be given to prevent new gout attacks  · Steroids  reduce inflammation and can help your joint stiffness and pain during gout attacks  · Uric acid medicine  may be given to reduce the amount of uric acid your body makes  Some medicines may help you pass more uric acid when you urinate  · Take your medicine as directed  Contact your healthcare provider if you think your medicine is not helping or if you have side effects  Tell him or her if you are allergic to any medicine  Keep a list of the medicines, vitamins, and herbs you take  Include the amounts, and when and why you take them  Bring the list or the pill bottles to follow-up visits  Carry your medicine list with you in case of an emergency      Manage your symptoms:   · Rest your painful joint so it can heal   Your healthcare provider may recommend crutches or a walker if the affected joint is in a leg  · Apply ice to your joint  Ice decreases pain and swelling  Use an ice pack, or put crushed ice in a plastic bag  Cover the ice pack or bag with a towel before you apply it to your painful joint  Apply ice for 15 to 20 minutes every hour, or as directed  · Elevate your joint  Elevation helps reduce swelling and pain  Raise your joint above the level of your heart as often as you can  Prop your painful joint on pillows to keep it above your heart comfortably  · Go to physical therapy if directed  A physical therapist can teach you exercises to improve flexibility and range of motion  Help prevent gout attacks:   · Do not eat high-purine foods  These foods include meats, seafood, asparagus, spinach, cauliflower, and some types of beans  Healthcare providers may tell you to eat more low-fat milk products, such as yogurt  Milk products may decrease your risk for gout attacks  Vitamin C and coffee may also help  Your healthcare provider or dietitian can help you create a meal plan  · Drink liquids as directed  Liquids such as water help remove uric acid from your body  Ask how much liquid to drink each day and which liquids are best for you  · Maintain a healthy weight  Weight loss may decrease the amount of uric acid in your body  Ask your healthcare provider how much you should weigh  Ask him to help you create a weight loss plan if you are overweight  · Control your blood sugar level if you have diabetes  Keep your blood sugar level in a normal range  This can help prevent gout attacks  · Limit or do not drink alcohol as directed  Alcohol can trigger a gout attack  Alcohol also increases your risk for dehydration  Ask your healthcare provider if alcohol is safe for you  Follow up with your doctor as directed: You may be referred to a rheumatologist or podiatrist  Write down your questions so you remember to ask them during your visits  © Copyright [address] [place] [address] Information is for End User's use only and may not be sold, redistributed or otherwise used for commercial purposes  All illustrations and images included in CareNotes® are the copyrighted property of A D [employer] M [employer]  or Alejandra De Leon  The above information is an  only  It is not intended as medical advice for individual conditions or treatments  Talk to your doctor, nurse or pharmacist before following any medical regimen to see if it is safe and effective for you

## 2021-03-06 NOTE — PROGRESS NOTES
[id number]        NAME: Nicki Aquino is a 72 y o  female  : 1950    MRN: [id number]  DATE: 2021  TIME: 11:54 AM    Assessment and Plan   Left foot pain [M79 672]  1  Left foot pain  XR foot 3+ vw left    predniSONE 10 mg tablet   2  Rash           Patient Instructions     Patient Instructions     X-rays negative for fracture  Likely gout due to presentation and exam findings  She has tolerated prednisone in the past we will use that now  She can ice the foot and elevate  Follow-up with PCP  May benefit from preventative medicine such as allopurinol if she continues with flare ups  Rash appears to be mild eczema and dry skin  She can use hydrocortisone over-the-counter for that  Gout   AMBULATORY CARE:   Gout  is a form of arthritis that causes severe joint pain and stiffness  Acute gout pain starts suddenly, gets worse quickly, and stops on its own  Acute gout can become chronic and cause permanent damage to your joints  Seek care immediately if:   · You have severe pain in one or more of your joints that you cannot tolerate  · You have a fever or redness that spreads beyond the joint area  Call your doctor if:   · You have new symptoms, such as a rash, after you start gout treatment  · Your joint pain and swelling do not go away, even after treatment  · You are not urinating as much or as often as you usually do  · You have trouble taking your gout medicines  · You have questions or concerns about your condition or care  Stages of gout:   · Hyperuricemia  starts with high levels of uric acid  Hyperuricemia is not gout, but it increases your risk for gout  You may have no symptoms at this stage, and it usually does not need treatment  · Acute gouty arthritis  starts with a sudden attack of pain and swelling, usually in 1 joint  The attack may last from a few days to 2 weeks  · Intercritical gout  is the time between attacks   You may go months or years without another attack  You will not have joint pain or stiffness, but this does not mean your gout is cured  You will still need treatment to prevent chronic gout  · Chronic tophaceous gout  develops if gout is not treated  Large amounts of uric acid crystals, called tophi, collect around your joints  The crystals can destroy or deform the joints  Gout attacks occur more often, and last hours to weeks  More than 1 joint may be painful and swollen  At this stage, gout symptoms do not go away on their own  Medicines: You may need any of the following:  · Prescription pain medicine  may be given  Ask your healthcare provider how to take this medicine safely  Some prescription pain medicines contain acetaminophen  Do not take other medicines that contain acetaminophen without talking to your healthcare provider  Too much acetaminophen may cause liver damage  Prescription pain medicine may cause constipation  Ask your healthcare provider how to prevent or treat constipation  · NSAIDs , such as ibuprofen, help decrease swelling, pain, and fever  This medicine is available with or without a doctor's order  NSAIDs can cause stomach bleeding or kidney problems in certain people  If you take blood thinner medicine, always ask your healthcare provider if NSAIDs are safe for you  Always read the medicine label and follow directions  · Gout medicine  decreases joint pain and swelling  It may also be given to prevent new gout attacks  · Steroids  reduce inflammation and can help your joint stiffness and pain during gout attacks  · Uric acid medicine  may be given to reduce the amount of uric acid your body makes  Some medicines may help you pass more uric acid when you urinate  · Take your medicine as directed  Contact your healthcare provider if you think your medicine is not helping or if you have side effects  Tell him or her if you are allergic to any medicine   Keep a list of the medicines, vitamins, and herbs you take  Include the amounts, and when and why you take them  Bring the list or the pill bottles to follow-up visits  Carry your medicine list with you in case of an emergency  Manage your symptoms:   · Rest your painful joint so it can heal   Your healthcare provider may recommend crutches or a walker if the affected joint is in a leg  · Apply ice to your joint  Ice decreases pain and swelling  Use an ice pack, or put crushed ice in a plastic bag  Cover the ice pack or bag with a towel before you apply it to your painful joint  Apply ice for 15 to 20 minutes every hour, or as directed  · Elevate your joint  Elevation helps reduce swelling and pain  Raise your joint above the level of your heart as often as you can  Prop your painful joint on pillows to keep it above your heart comfortably  · Go to physical therapy if directed  A physical therapist can teach you exercises to improve flexibility and range of motion  Help prevent gout attacks:   · Do not eat high-purine foods  These foods include meats, seafood, asparagus, spinach, cauliflower, and some types of beans  Healthcare providers may tell you to eat more low-fat milk products, such as yogurt  Milk products may decrease your risk for gout attacks  Vitamin C and coffee may also help  Your healthcare provider or dietitian can help you create a meal plan  · Drink liquids as directed  Liquids such as water help remove uric acid from your body  Ask how much liquid to drink each day and which liquids are best for you  · Maintain a healthy weight  Weight loss may decrease the amount of uric acid in your body  Ask your healthcare provider how much you should weigh  Ask him to help you create a weight loss plan if you are overweight  · Control your blood sugar level if you have diabetes  Keep your blood sugar level in a normal range  This can help prevent gout attacks       · Limit or do not drink alcohol as directed  Alcohol can trigger a gout attack  Alcohol also increases your risk for dehydration  Ask your healthcare provider if alcohol is safe for you  Follow up with your doctor as directed: You may be referred to a rheumatologist or podiatrist  Write down your questions so you remember to ask them during your visits  © Copyright [address] Information is for End User's use only and may not be sold, redistributed or otherwise used for commercial purposes  All illustrations and images included in CareNotes® are the copyrighted property of [facility] [employer]  or [address] [place] [address] [place]   The above information is an  only  It is not intended as medical advice for individual conditions or treatments  Talk to your doctor, nurse or pharmacist before following any medical regimen to see if it is safe and effective for you  Follow up with PCP in 3-5 days  Proceed to  ER if symptoms worsen  Chief Complaint     Chief Complaint   Patient presents with    Foot Pain     left, started yesterday, denies injury, states it started at thje great toe          History of Present Illness         81-year-old female presents with left foot pain since yesterday  She woke up with it no fall or trauma  She has painful walking and weight-bearing  She said lying in bed with the sheets touching her foot or even putting his sock or shoe on her  No previous history of similar pain  Pain is along the great toe and the medial foot  No treatment at home  She also complains of itchy rash along her bilateral flanks and lower back radiating around to her abdomen for last several weeks  She has been putting a cream on at home that she had left over from a previous rash  No new exposures  No drainage  Review of Systems   Review of Systems   Constitutional: Negative  HENT: Negative  Eyes: Negative  Respiratory: Negative  Cardiovascular: Negative  Gastrointestinal: Negative  Musculoskeletal: Positive for arthralgias, joint swelling and myalgias  Skin: Positive for rash  Current Medications       Current Outpatient Medications:     albuterol (PROVENTIL HFA,VENTOLIN HFA) 90 mcg/act inhaler, Inhale 2 puffs every 6 (six) hours as needed, Disp: , Rfl:     atorvastatin (LIPITOR) 20 mg tablet, Take 1 tablet (20 mg total) by mouth daily, Disp: 90 tablet, Rfl: 1    omeprazole (PriLOSEC) 20 mg delayed release capsule, TAKE  (1)  CAPSULE  TWICE DAILY  , Disp: 180 capsule, Rfl: 0    pseudoephedrine-guaifenesin (MUCINEX D)  MG per tablet, Take 1 tablet by mouth 2 (two) times a day as needed for congestion or cough, Disp: 20 tablet, Rfl: 0    travoprost (TRAVATAN-Z) 0 004 % ophthalmic solution, 1 drop daily at bedtime, Disp: , Rfl:     acetaminophen (TYLENOL) 325 mg tablet, Take by mouth, Disp: , Rfl:     predniSONE 10 mg tablet, Take 6 tablets (60 mg total) by mouth daily for 1 day, THEN 5 tablets (50 mg total) daily for 1 day, THEN 4 tablets (40 mg total) daily for 1 day, THEN 3 tablets (30 mg total) daily for 1 day, THEN 2 tablets (20 mg total) daily for 1 day, THEN 1 tablet (10 mg total) daily for 1 day , Disp: 21 tablet, Rfl: 0    Spiriva Respimat 2 5 MCG/ACT AERS inhaler, , Disp: , Rfl:     tiotropium (Spiriva Respimat) 2 5 MCG/ACT AERS inhaler, Inhale daily, Disp: , Rfl:     valACYclovir (VALTREX) 1,000 mg tablet, Take 1 tablet (1,000 mg total) by mouth 2 (two) times a day for 1 day (Patient not taking: Reported on 1/28/2021), Disp: 4 tablet, Rfl: 0    Current Allergies     Allergies as of 03/06/2021 - Reviewed 03/06/2021   Allergen Reaction Noted    Ciprofloxacin  02/03/2020    Clindamycin  02/03/2020    Doxycycline  02/03/2020    Fluticasone-salmeterol Other (See Comments) 10/13/2020    Keflex [cephalexin]  02/03/2020    Naproxen Hives and Other (See Comments) 08/03/2017    Nsaids  10/04/2020    Penicillins  10/04/2020    Sulfa antibiotics Hives, Itching, and Swelling 08/03/2017            The following portions of the patient's history were reviewed and updated as appropriate: allergies, current medications, past family history, past medical history, past social history, past surgical history and problem list      Past Medical History:   Diagnosis Date    Cancer ([address] )     gallbladder    COVID-19     GERD (gastroesophageal reflux disease)     Glaucoma     History of gallbladder cancer     Approx  3 years ago    Hyperlipemia     Murmur     Rheumatic fever        Past Surgical History:   Procedure Laterality Date    CHOLECYSTECTOMY      TONSILLECTOMY  CHILDHOOD       Family History   Problem Relation Age of Onset    Heart attack Mother     Kidney disease Father     Breast cancer Sister 77    No Known Problems Daughter     No Known Problems Maternal Grandmother     No Known Problems Paternal Grandmother     No Known Problems Sister     No Known Problems Maternal Aunt     No Known Problems Maternal Aunt     No Known Problems Maternal Aunt     No Known Problems Paternal Aunt     No Known Problems Paternal Aunt          Medications have been verified  Objective   /70   Pulse 57   Temp 98 3 °F (36 8 °C) (Temporal)   Resp 18   Ht 5' 3" (1 6 m)   Wt 81 6 kg (180 lb)   SpO2 98%   BMI 31 89 kg/m²        Physical Exam     Physical Exam  Constitutional:       General: She is not in acute distress  Appearance: She is well-developed  Musculoskeletal:      Comments: Left  along the 1st MTP joint great toe  Nontender along the toe IP joint  She has painful range of motion of the 1st MTP joint  Very tender to light palpation including plantar aspect of the 1st MTP joint  Mild swelling  Brisk cap refill  Sensation intact  Skin:     Comments: Patient has mild eczema and dry skin along the bilateral flanks posterior back around to her abdomen  No vesicles or drainage     Neurological:      Mental Status: She is alert and oriented to person, place, and time

## 2021-03-08 ENCOUNTER — OFFICE VISIT (OUTPATIENT)
Dept: FAMILY MEDICINE CLINIC | Facility: CLINIC | Age: 71
End: 2021-03-08
Payer: COMMERCIAL

## 2021-03-08 VITALS
WEIGHT: 182.2 LBS | HEART RATE: 69 BPM | HEIGHT: 63 IN | TEMPERATURE: 98.3 F | OXYGEN SATURATION: 99 % | RESPIRATION RATE: 18 BRPM | BODY MASS INDEX: 32.28 KG/M2 | DIASTOLIC BLOOD PRESSURE: 74 MMHG | SYSTOLIC BLOOD PRESSURE: 120 MMHG

## 2021-03-08 DIAGNOSIS — K21.9 GASTROESOPHAGEAL REFLUX DISEASE WITHOUT ESOPHAGITIS: ICD-10-CM

## 2021-03-08 DIAGNOSIS — H69.81 DYSFUNCTION OF RIGHT EUSTACHIAN TUBE: Primary | ICD-10-CM

## 2021-03-08 DIAGNOSIS — E78.5 HYPERLIPIDEMIA, UNSPECIFIED HYPERLIPIDEMIA TYPE: ICD-10-CM

## 2021-03-08 PROCEDURE — 1036F TOBACCO NON-USER: CPT | Performed by: NURSE PRACTITIONER

## 2021-03-08 PROCEDURE — 3725F SCREEN DEPRESSION PERFORMED: CPT | Performed by: NURSE PRACTITIONER

## 2021-03-08 PROCEDURE — 99213 OFFICE O/P EST LOW 20 MIN: CPT | Performed by: NURSE PRACTITIONER

## 2021-03-08 PROCEDURE — 3008F BODY MASS INDEX DOCD: CPT | Performed by: NURSE PRACTITIONER

## 2021-03-08 PROCEDURE — 1160F RVW MEDS BY RX/DR IN RCRD: CPT | Performed by: NURSE PRACTITIONER

## 2021-03-08 RX ORDER — OMEPRAZOLE 20 MG/1
20 CAPSULE, DELAYED RELEASE ORAL DAILY
Qty: 180 CAPSULE | Refills: 3 | Status: SHIPPED | OUTPATIENT
Start: 2021-03-08 | End: 2021-12-07

## 2021-03-08 RX ORDER — ATORVASTATIN CALCIUM 20 MG/1
20 TABLET, FILM COATED ORAL DAILY
Qty: 90 TABLET | Refills: 2 | Status: SHIPPED | OUTPATIENT
Start: 2021-03-08 | End: 2021-09-19 | Stop reason: HOSPADM

## 2021-03-08 RX ORDER — MOMETASONE FUROATE 50 UG/1
2 SPRAY, METERED NASAL DAILY
Qty: 17 G | Refills: 0 | Status: SHIPPED | OUTPATIENT
Start: 2021-03-08 | End: 2021-12-07

## 2021-03-08 NOTE — PROGRESS NOTES
[address] [place] [address] [facility]        NAME: Genia Stephens is a 72 y o  female  : 1950    MRN: [id number]  DATE: 2021  TIME: 12:34 PM    Assessment and Plan   Dysfunction of right eustachian tube [H69 81]  1  Dysfunction of right eustachian tube  mometasone (NASONEX) 50 mcg/act nasal spray   2  Gastroesophageal reflux disease without esophagitis  omeprazole (PriLOSEC) 20 mg delayed release capsule   3  Hyperlipidemia, unspecified hyperlipidemia type  atorvastatin (LIPITOR) 20 mg tablet     1  Dysfunction of right eustachian tube  Start Flonase, dizziness could be from fluid or side effect of prednisone  - mometasone (NASONEX) 50 mcg/act nasal spray; 2 sprays into each nostril daily  Dispense: 17 g; Refill: 0    2  Gastroesophageal reflux disease without esophagitis  Needs refills today  - omeprazole (PriLOSEC) 20 mg delayed release capsule; Take 1 capsule (20 mg total) by mouth daily  Dispense: 180 capsule; Refill: 3    3  Hyperlipidemia, unspecified hyperlipidemia type  Needs refills today  - atorvastatin (LIPITOR) 20 mg tablet; Take 1 tablet (20 mg total) by mouth daily  Dispense: 90 tablet; Refill: 2      Patient Instructions     Patient Instructions   Continue prednisone  Start nasal spray daily  Call the office if worsening symptoms  Chief Complaint     Chief Complaint   Patient presents with    Ear Problem     Patient states she has some fullness in her right ear and ringing in her left ear  She states she feels stuffy and sometimes dizzy  History of Present Illness        Patient here with c/o of "stuffiness" in her left ear x 2 days  Was seen in urgent care 3 days ago and started on prednisone taper  Reports no known history of gount in the past  Great toe on the left foot with pain that is improving  Continues with prednisone and getting pain improvement  Review of Systems   Review of Systems   Constitutional: Negative  Negative for chills and fatigue  HENT: Positive for ear pain ("stuffy")  Negative for congestion, sinus pressure, sinus pain, sneezing and sore throat  Respiratory: Negative  Negative for chest tightness, shortness of breath and wheezing  Cardiovascular: Negative  Negative for chest pain and palpitations  Gastrointestinal: Negative  Musculoskeletal: Positive for arthralgias (great toe left foot)  Neurological: Positive for dizziness  Psychiatric/Behavioral: Negative  PHQ-9 Depression Screening    PHQ-9:   Frequency of the following problems over the past two weeks:      Little interest or pleasure in doing things: 0 - not at all  Feeling down, depressed, or hopeless: 1 - several days  Trouble falling or staying asleep, or sleeping too much: 1 - several days  Feeling tired or having little energy: 0 - not at all  Poor appetite or overeatin - several days  Feeling bad about yourself - or that you are a failure or have let yourself or your family down: 0 - not at all  Trouble concentrating on things, such as reading the newspaper or watching television: 0 - not at all  Moving or speaking so slowly that other people could have noticed   Or the opposite - being so fidgety or restless that you have been moving around a lot more than usual: 0 - not at all  Thoughts that you would be better off dead, or of hurting yourself in some way: 0 - not at all  PHQ-2 Score: 1  PHQ-9 Score: 3        Current Medications       Current Outpatient Medications:     acetaminophen (TYLENOL) 325 mg tablet, Take by mouth, Disp: , Rfl:     albuterol (PROVENTIL HFA,VENTOLIN HFA) 90 mcg/act inhaler, Inhale 2 puffs every 6 (six) hours as needed, Disp: , Rfl:     atorvastatin (LIPITOR) 20 mg tablet, Take 1 tablet (20 mg total) by mouth daily, Disp: 90 tablet, Rfl: 2    omeprazole (PriLOSEC) 20 mg delayed release capsule, Take 1 capsule (20 mg total) by mouth daily, Disp: 180 capsule, Rfl: 3    predniSONE 10 mg tablet, Take 6 tablets (60 mg total) by mouth daily for 1 day, THEN 5 tablets (50 mg total) daily for 1 day, THEN 4 tablets (40 mg total) daily for 1 day, THEN 3 tablets (30 mg total) daily for 1 day, THEN 2 tablets (20 mg total) daily for 1 day, THEN 1 tablet (10 mg total) daily for 1 day , Disp: 21 tablet, Rfl: 0    pseudoephedrine-guaifenesin (MUCINEX D)  MG per tablet, Take 1 tablet by mouth 2 (two) times a day as needed for congestion or cough, Disp: 20 tablet, Rfl: 0    Spiriva Respimat 2 5 MCG/ACT AERS inhaler, , Disp: , Rfl:     tiotropium (Spiriva Respimat) 2 5 MCG/ACT AERS inhaler, Inhale daily, Disp: , Rfl:     travoprost (TRAVATAN-Z) 0 004 % ophthalmic solution, 1 drop daily at bedtime, Disp: , Rfl:     mometasone (NASONEX) 50 mcg/act nasal spray, 2 sprays into each nostril daily, Disp: 17 g, Rfl: 0    valACYclovir (VALTREX) 1,000 mg tablet, Take 1 tablet (1,000 mg total) by mouth 2 (two) times a day for 1 day (Patient not taking: Reported on 1/28/2021), Disp: 4 tablet, Rfl: 0    Current Allergies     Allergies as of 03/08/2021 - Reviewed 03/08/2021   Allergen Reaction Noted    Ciprofloxacin  02/03/2020    Clindamycin  02/03/2020    Doxycycline  02/03/2020    Fluticasone-salmeterol Other (See Comments) 10/13/2020    Keflex [cephalexin]  02/03/2020    Naproxen Hives and Other (See Comments) 08/03/2017    Nsaids  10/04/2020    Penicillins  10/04/2020    Sulfa antibiotics Hives, Itching, and Swelling 08/03/2017            The following portions of the patient's history were reviewed and updated as appropriate: allergies, current medications, past family history, past medical history, past social history, past surgical history and problem list      Past Medical History:   Diagnosis Date    [facility] [address] [address] )     gallbladder    COVID-19     GERD (gastroesophageal reflux disease)     Glaucoma     History of gallbladder cancer     Approx   3 years ago    Hyperlipemia     Murmur     Rheumatic fever        Past Surgical History:   Procedure Laterality Date    CHOLECYSTECTOMY      TONSILLECTOMY  CHILDHOOD       Family History   Problem Relation Age of Onset    Heart attack Mother     Kidney disease Father     Breast cancer Sister 77    No Known Problems Daughter     No Known Problems Maternal Grandmother     No Known Problems Paternal Grandmother     No Known Problems Sister     No Known Problems Maternal Aunt     No Known Problems Maternal Aunt     No Known Problems Maternal Aunt     No Known Problems Paternal Aunt     No Known Problems Paternal Aunt          Medications have been verified  Objective   /74   Pulse 69   Temp 98 3 °F (36 8 °C)   Resp 18   Ht 5' 3" (1 6 m)   Wt 82 6 kg (182 lb 3 2 oz)   SpO2 99%   BMI 32 28 kg/m²        Physical Exam     Physical Exam  Vitals signs and nursing note reviewed  Constitutional:       General: She is not in acute distress  Appearance: Normal appearance  She is well-developed  She is not ill-appearing  HENT:      Right Ear: Hearing and ear canal normal  A middle ear effusion (clear fluid noted) is present  Left Ear: Hearing, tympanic membrane and ear canal normal    Eyes:      General: Lids are normal    Pulmonary:      Breath sounds: No decreased breath sounds  Musculoskeletal: Normal range of motion  General: No tenderness or deformity  Skin:     General: Skin is warm  Findings: No erythema or rash  Neurological:      Mental Status: She is alert and oriented to person, place, and time  Psychiatric:         Behavior: Behavior normal  Behavior is cooperative  Thought Content:  Thought content normal

## 2021-04-06 ENCOUNTER — OFFICE VISIT (OUTPATIENT)
Dept: FAMILY MEDICINE CLINIC | Facility: CLINIC | Age: 71
End: 2021-04-06
Payer: COMMERCIAL

## 2021-04-06 VITALS
WEIGHT: 181.8 LBS | OXYGEN SATURATION: 98 % | BODY MASS INDEX: 32.21 KG/M2 | HEART RATE: 74 BPM | SYSTOLIC BLOOD PRESSURE: 126 MMHG | HEIGHT: 63 IN | RESPIRATION RATE: 20 BRPM | TEMPERATURE: 97.3 F | DIASTOLIC BLOOD PRESSURE: 84 MMHG

## 2021-04-06 DIAGNOSIS — Z79.899 ON STATIN THERAPY: ICD-10-CM

## 2021-04-06 DIAGNOSIS — T14.8XXA ABRASION: ICD-10-CM

## 2021-04-06 DIAGNOSIS — F32.A DEPRESSION, UNSPECIFIED DEPRESSION TYPE: ICD-10-CM

## 2021-04-06 DIAGNOSIS — E78.1 PURE HYPERTRIGLYCERIDEMIA: Primary | ICD-10-CM

## 2021-04-06 PROBLEM — J44.9 COPD, GROUP A, BY GOLD 2017 CLASSIFICATION (HCC): Status: ACTIVE | Noted: 2020-12-08

## 2021-04-06 PROCEDURE — 90471 IMMUNIZATION ADMIN: CPT

## 2021-04-06 PROCEDURE — 3288F FALL RISK ASSESSMENT DOCD: CPT | Performed by: NURSE PRACTITIONER

## 2021-04-06 PROCEDURE — 1101F PT FALLS ASSESS-DOCD LE1/YR: CPT | Performed by: NURSE PRACTITIONER

## 2021-04-06 PROCEDURE — 1036F TOBACCO NON-USER: CPT | Performed by: NURSE PRACTITIONER

## 2021-04-06 PROCEDURE — 1160F RVW MEDS BY RX/DR IN RCRD: CPT | Performed by: NURSE PRACTITIONER

## 2021-04-06 PROCEDURE — 90715 TDAP VACCINE 7 YRS/> IM: CPT

## 2021-04-06 PROCEDURE — 99214 OFFICE O/P EST MOD 30 MIN: CPT | Performed by: NURSE PRACTITIONER

## 2021-04-06 NOTE — PROGRESS NOTES
[facility]        NAME: Ayaka Carrasco is a 80 y o  female  : 1950    MRN: [id number]  DATE: 2021  TIME: 10:14 AM    Assessment and Plan   Pure hypertriglyceridemia [E78 1]  1  Pure hypertriglyceridemia  Lipid Panel with Direct LDL reflex    Comprehensive metabolic panel   2  On statin therapy  Comprehensive metabolic panel   3  Abrasion  TDAP VACCINE GREATER THAN OR EQUAL TO 8YO IM   4  Depression, unspecified depression type       1  Pure hypertriglyceridemia    - Lipid Panel with Direct LDL reflex; Future  - Comprehensive metabolic panel; Future    2  On statin therapy    - Comprehensive metabolic panel; Future    3  Abrasion  Abrasion/laceration to left thumb, would like tetanus vaccine updated as it has been more than 5 years, possibly 10    - TDAP VACCINE GREATER THAN OR EQUAL TO 8YO IM  BMI Counseling: Body mass index is 32 2 kg/m²  The BMI is above normal  Nutrition recommendations include encouraging healthy choices of fruits and vegetables, consuming healthier snacks, limiting drinks that contain sugar, moderation in carbohydrate intake, increasing intake of lean protein, reducing intake of saturated and trans fat and reducing intake of cholesterol  Exercise recommendations include exercising 3-5 times per week  Patient Instructions     There are no Patient Instructions on file for this visit  Chief Complaint     Chief Complaint   Patient presents with    Follow-up         History of Present Illness       Patient here for routine follow up visit  Denies any complaints  Seasonal allergies  Improved from when she was last seen  GERD- well controlled on the Prilosec, taking everyday  HLD-  Taking liptor, complaint, check labs  Yearly  Follow up for AWV  And will check labs before this appointment  Asthma- only using the Spiriva occasionally, breathing gets hard with walking up stairs, has some wheezing intermittently, not every day  Using albuterol very minimally, not even once a week  Review of Systems   Review of Systems   Constitutional: Negative  Negative for fatigue  Respiratory: Negative  Negative for shortness of breath and wheezing  Cardiovascular: Negative for chest pain, palpitations and leg swelling  Musculoskeletal: Negative  Skin: Positive for wound  Neurological: Negative  Negative for dizziness and headaches  Psychiatric/Behavioral: Negative  PHQ-9 Depression Screening    PHQ-9:   Frequency of the following problems over the past two weeks:      Little interest or pleasure in doing things: 0 - not at all  Feeling down, depressed, or hopeless: 1 - several days  Trouble falling or staying asleep, or sleeping too much: 2 - more than half the days  Feeling tired or having little energy: 0 - not at all  Poor appetite or overeatin - not at all  Feeling bad about yourself - or that you are a failure or have let yourself or your family down: 0 - not at all  Trouble concentrating on things, such as reading the newspaper or watching television: 0 - not at all  Moving or speaking so slowly that other people could have noticed   Or the opposite - being so fidgety or restless that you have been moving around a lot more than usual: 0 - not at all  Thoughts that you would be better off dead, or of hurting yourself in some way: 0 - not at all  PHQ-2 Score: 1  PHQ-9 Score: 3        Current Medications       Current Outpatient Medications:     acetaminophen (TYLENOL) 325 mg tablet, Take by mouth, Disp: , Rfl:     albuterol (PROVENTIL HFA,VENTOLIN HFA) 90 mcg/act inhaler, Inhale 2 puffs every 6 (six) hours as needed, Disp: , Rfl:     atorvastatin (LIPITOR) 20 mg tablet, Take 1 tablet (20 mg total) by mouth daily, Disp: 90 tablet, Rfl: 2    mometasone (NASONEX) 50 mcg/act nasal spray, 2 sprays into each nostril daily (Patient taking differently: 2 sprays into each nostril as needed ), Disp: 17 g, Rfl: 0   omeprazole (PriLOSEC) 20 mg delayed release capsule, Take 1 capsule (20 mg total) by mouth daily, Disp: 180 capsule, Rfl: 3    travoprost (TRAVATAN-Z) 0 004 % ophthalmic solution, 1 drop daily at bedtime, Disp: , Rfl:     Spiriva Respimat 2 5 MCG/ACT AERS inhaler, , Disp: , Rfl:     tiotropium (Spiriva Respimat) 2 5 MCG/ACT AERS inhaler, Inhale daily, Disp: , Rfl:     valACYclovir (VALTREX) 1,000 mg tablet, Take 1 tablet (1,000 mg total) by mouth 2 (two) times a day for 1 day (Patient not taking: Reported on 1/28/2021), Disp: 4 tablet, Rfl: 0    Current Allergies     Allergies as of 04/06/2021 - Reviewed 04/06/2021   Allergen Reaction Noted    Ciprofloxacin  02/03/2020    Clindamycin  02/03/2020    Doxycycline  02/03/2020    Fluticasone-salmeterol Other (See Comments) 10/13/2020    Keflex [cephalexin]  02/03/2020    Naproxen Hives and Other (See Comments) 08/03/2017    Nsaids  10/04/2020    Penicillins  10/04/2020    Sulfa antibiotics Hives, Itching, and Swelling 08/03/2017            The following portions of the patient's history were reviewed and updated as appropriate: allergies, current medications, past family history, past medical history, past social history, past surgical history and problem list      Past Medical History:   Diagnosis Date    Cancer ([facility] [address] )     gallbladder    COVID-19     GERD (gastroesophageal reflux disease)     Glaucoma     History of gallbladder cancer     Approx   3 years ago    Hyperlipemia     Murmur     Rheumatic fever        Past Surgical History:   Procedure Laterality Date    CHOLECYSTECTOMY      TONSILLECTOMY  CHILDHOOD       Family History   Problem Relation Age of Onset    Heart attack Mother     Kidney disease Father     Breast cancer Sister 77    No Known Problems Daughter     No Known Problems Maternal Grandmother     No Known Problems Paternal Grandmother     No Known Problems Sister     No Known Problems Maternal Aunt     No Known Problems Maternal Aunt     No Known Problems Maternal Aunt     No Known Problems Paternal Aunt     No Known Problems Paternal Aunt          Medications have been verified  Objective   /84   Pulse 74   Temp (!) 97 3 °F (36 3 °C)   Resp 20   Ht 5' 3" (1 6 m)   Wt 82 5 kg (181 lb 12 8 oz)   SpO2 98%   BMI 32 20 kg/m²        Physical Exam     Physical Exam  Vitals signs and nursing note reviewed  Constitutional:       General: She is not in acute distress  Appearance: Normal appearance  She is well-developed  She is not ill-appearing  Eyes:      General: Lids are normal    Cardiovascular:      Rate and Rhythm: Normal rate and regular rhythm  Heart sounds: Normal heart sounds, S1 normal and S2 normal    Pulmonary:      Effort: Pulmonary effort is normal       Breath sounds: Normal breath sounds  No decreased breath sounds or wheezing  Musculoskeletal: Normal range of motion  General: No tenderness or deformity  Skin:     General: Skin is warm  Findings: No erythema or rash  Neurological:      Mental Status: She is alert and oriented to person, place, and time  Psychiatric:         Behavior: Behavior normal  Behavior is cooperative  Thought Content:  Thought content normal

## 2021-05-03 ENCOUNTER — OFFICE VISIT (OUTPATIENT)
Dept: SURGERY | Facility: CLINIC | Age: 71
End: 2021-05-03
Payer: COMMERCIAL

## 2021-05-03 ENCOUNTER — OFFICE VISIT (OUTPATIENT)
Dept: FAMILY MEDICINE CLINIC | Facility: CLINIC | Age: 71
End: 2021-05-03
Payer: COMMERCIAL

## 2021-05-03 ENCOUNTER — APPOINTMENT (OUTPATIENT)
Dept: LAB | Facility: CLINIC | Age: 71
End: 2021-05-03
Payer: COMMERCIAL

## 2021-05-03 ENCOUNTER — TELEPHONE (OUTPATIENT)
Dept: FAMILY MEDICINE CLINIC | Facility: CLINIC | Age: 71
End: 2021-05-03

## 2021-05-03 VITALS
DIASTOLIC BLOOD PRESSURE: 70 MMHG | SYSTOLIC BLOOD PRESSURE: 110 MMHG | OXYGEN SATURATION: 97 % | WEIGHT: 179.2 LBS | BODY MASS INDEX: 31.75 KG/M2 | TEMPERATURE: 97.9 F | HEART RATE: 55 BPM | HEIGHT: 63 IN

## 2021-05-03 VITALS
TEMPERATURE: 97.8 F | DIASTOLIC BLOOD PRESSURE: 72 MMHG | WEIGHT: 179 LBS | HEIGHT: 63 IN | HEART RATE: 58 BPM | SYSTOLIC BLOOD PRESSURE: 118 MMHG | BODY MASS INDEX: 31.71 KG/M2 | RESPIRATION RATE: 18 BRPM

## 2021-05-03 DIAGNOSIS — R42 LIGHTHEADEDNESS: ICD-10-CM

## 2021-05-03 DIAGNOSIS — R10.11 RUQ ABDOMINAL PAIN: Primary | ICD-10-CM

## 2021-05-03 DIAGNOSIS — R10.11 RUQ ABDOMINAL PAIN: ICD-10-CM

## 2021-05-03 DIAGNOSIS — C23 PRIMARY GALL BLADDER ADENOCARCINOMA (HCC): ICD-10-CM

## 2021-05-03 DIAGNOSIS — R10.11 RIGHT UPPER QUADRANT PAIN: Primary | ICD-10-CM

## 2021-05-03 LAB
ALBUMIN SERPL BCP-MCNC: 3.9 G/DL (ref 3.5–5)
ALP SERPL-CCNC: 81 U/L (ref 46–116)
ALT SERPL W P-5'-P-CCNC: 21 U/L (ref 12–78)
ANION GAP SERPL CALCULATED.3IONS-SCNC: 5 MMOL/L (ref 4–13)
AST SERPL W P-5'-P-CCNC: 16 U/L (ref 5–45)
BASOPHILS # BLD AUTO: 0.05 THOUSANDS/ΜL (ref 0–0.1)
BASOPHILS NFR BLD AUTO: 1 % (ref 0–1)
BILIRUB SERPL-MCNC: 0.63 MG/DL (ref 0.2–1)
BUN SERPL-MCNC: 12 MG/DL (ref 5–25)
CALCIUM SERPL-MCNC: 9.4 MG/DL (ref 8.3–10.1)
CHLORIDE SERPL-SCNC: 110 MMOL/L (ref 100–108)
CO2 SERPL-SCNC: 28 MMOL/L (ref 21–32)
CREAT SERPL-MCNC: 0.72 MG/DL (ref 0.6–1.3)
EOSINOPHIL # BLD AUTO: 0.31 THOUSAND/ΜL (ref 0–0.61)
EOSINOPHIL NFR BLD AUTO: 6 % (ref 0–6)
ERYTHROCYTE [DISTWIDTH] IN BLOOD BY AUTOMATED COUNT: 13.3 % (ref 11.6–15.1)
GFR SERPL CREATININE-BSD FRML MDRD: 85 ML/MIN/1.73SQ M
GLUCOSE P FAST SERPL-MCNC: 92 MG/DL (ref 65–99)
HCT VFR BLD AUTO: 43.8 % (ref 34.8–46.1)
HGB BLD-MCNC: 14.5 G/DL (ref 11.5–15.4)
IMM GRANULOCYTES # BLD AUTO: 0.01 THOUSAND/UL (ref 0–0.2)
IMM GRANULOCYTES NFR BLD AUTO: 0 % (ref 0–2)
LYMPHOCYTES # BLD AUTO: 2.01 THOUSANDS/ΜL (ref 0.6–4.47)
LYMPHOCYTES NFR BLD AUTO: 36 % (ref 14–44)
MCH RBC QN AUTO: 32.7 PG (ref 26.8–34.3)
MCHC RBC AUTO-ENTMCNC: 33.1 G/DL (ref 31.4–37.4)
MCV RBC AUTO: 99 FL (ref 82–98)
MONOCYTES # BLD AUTO: 0.5 THOUSAND/ΜL (ref 0.17–1.22)
MONOCYTES NFR BLD AUTO: 9 % (ref 4–12)
NEUTROPHILS # BLD AUTO: 2.76 THOUSANDS/ΜL (ref 1.85–7.62)
NEUTS SEG NFR BLD AUTO: 48 % (ref 43–75)
NRBC BLD AUTO-RTO: 0 /100 WBCS
PLATELET # BLD AUTO: 237 THOUSANDS/UL (ref 149–390)
PMV BLD AUTO: 10.8 FL (ref 8.9–12.7)
POTASSIUM SERPL-SCNC: 4.5 MMOL/L (ref 3.5–5.3)
PROT SERPL-MCNC: 7.6 G/DL (ref 6.4–8.2)
RBC # BLD AUTO: 4.44 MILLION/UL (ref 3.81–5.12)
SODIUM SERPL-SCNC: 143 MMOL/L (ref 136–145)
TSH SERPL DL<=0.05 MIU/L-ACNC: 2.38 UIU/ML (ref 0.36–3.74)
WBC # BLD AUTO: 5.64 THOUSAND/UL (ref 4.31–10.16)

## 2021-05-03 PROCEDURE — 99204 OFFICE O/P NEW MOD 45 MIN: CPT | Performed by: SURGERY

## 2021-05-03 PROCEDURE — 36415 COLL VENOUS BLD VENIPUNCTURE: CPT

## 2021-05-03 PROCEDURE — 84443 ASSAY THYROID STIM HORMONE: CPT

## 2021-05-03 PROCEDURE — 80053 COMPREHEN METABOLIC PANEL: CPT

## 2021-05-03 PROCEDURE — 85025 COMPLETE CBC W/AUTO DIFF WBC: CPT

## 2021-05-03 PROCEDURE — 99214 OFFICE O/P EST MOD 30 MIN: CPT | Performed by: FAMILY MEDICINE

## 2021-05-03 PROCEDURE — 3008F BODY MASS INDEX DOCD: CPT | Performed by: NURSE PRACTITIONER

## 2021-05-03 NOTE — ASSESSMENT & PLAN NOTE
Recent lab work and CT imaging reveals no evidence of recurrence of her stage IIIB gallbladder carcinoma status post segment 4 5 hepatectomy and lymphadenectomy in August 2017

## 2021-05-03 NOTE — ASSESSMENT & PLAN NOTE
Patient is a pleasant 67-year-old female with a past medical history significant for stage III B gallbladder carcinoma status post left segment 4 5 hepatectomy and lymphadenectomy in August 2017 presenting to the office with a 3-4 week history of waxing and waning right upper quadrant abdominal pain  A     Patient reports that over the past 3-4 weeks he has had constant pain in the right upper quadrant which has waxed and waned in intensity from bearable to unbearable at times  She reports some radiation of pain through to her back  She contacted her oncologist to ordered a CT scan of the abdomen and pelvis performed on April 30, 2021  This was a normal study with the expected postoperative changes in the right upper quadrant but no signs of locally recurrent carcinoma  She also had blood work that confirmed the presence of a normal CA 19 9 level  She does not recall ever having had chickenpox  She denies a history of shingles  Today on physical examination the patient is well appearing  She appears comfortable here in the office  She is awake alert oriented  Pleasant competent reliable as a historian  Her abdomen is soft, rotund  She has a well-healed right subcostal incision in the right upper quadrant  No evidence for ventral incisional hernia  Inspection of the skin reveals no evidence of cutaneous manifestation of herpes zoster  Palpation reveals mild right-sided abdominal pain in the upper and lower quadrants equally  No true guarding rebound or peritoneal signs  The patient has one-month history of chronic recurrent right upper quadrant and right lower quadrant abdominal pain of uncertain origin  To investigate her complaints I have recommended a stat ultrasound of the abdomen  Additionally I have advised that she obtain a consultation with Gastroenterology for consideration for both EGD and colonoscopy  I look for to seeing her back in 2 weeks time    I have encouraged her to follow up sooner on an as-needed basis

## 2021-05-03 NOTE — PROGRESS NOTES
Assessment/Plan:    Right upper quadrant pain   Patient is a pleasant 78-year-old female with a past medical history significant for stage III B gallbladder carcinoma status post left segment 4 5 hepatectomy and lymphadenectomy in August 2017 presenting to the office with a 3-4 week history of waxing and waning right upper quadrant abdominal pain  A     Patient reports that over the past 3-4 weeks he has had constant pain in the right upper quadrant which has waxed and waned in intensity from bearable to unbearable at times  She reports some radiation of pain through to her back  She contacted her oncologist to ordered a CT scan of the abdomen and pelvis performed on April 30, 2021  This was a normal study with the expected postoperative changes in the right upper quadrant but no signs of locally recurrent carcinoma  She also had blood work that confirmed the presence of a normal CA 19 9 level  She does not recall ever having had chickenpox  She denies a history of shingles  Today on physical examination the patient is well appearing  She appears comfortable here in the office  She is awake alert oriented  Pleasant competent reliable as a historian  Her abdomen is soft, rotund  She has a well-healed right subcostal incision in the right upper quadrant  No evidence for ventral incisional hernia  Inspection of the skin reveals no evidence of cutaneous manifestation of herpes zoster  Palpation reveals mild right-sided abdominal pain in the upper and lower quadrants equally  No true guarding rebound or peritoneal signs  The patient has one-month history of chronic recurrent right upper quadrant and right lower quadrant abdominal pain of uncertain origin  To investigate her complaints I have recommended a stat ultrasound of the abdomen  Additionally I have advised that she obtain a consultation with Gastroenterology for consideration for both EGD and colonoscopy        I look for to seeing her back in 2 weeks time  I have encouraged her to follow up sooner on an as-needed basis  Primary gall bladder adenocarcinoma ([address] )    Recent lab work and CT imaging reveals no evidence of recurrence of her stage IIIB gallbladder carcinoma status post segment 4 5 hepatectomy and lymphadenectomy in August 2017  Subjective:      Patient ID: Chelita Gan is a 80 y o  female  Patient is here today for right upper quadrant abdominal pain that has been present for 3-4 weeks  Denied nausea,vomting,fever or chills  Georgia Hollins MA          Review of Systems   Constitutional: Negative for chills and fever  HENT: Negative for ear pain and sore throat  Eyes: Negative for pain and visual disturbance  Respiratory: Negative for cough and shortness of breath  Cardiovascular: Negative for chest pain and palpitations  Gastrointestinal: Negative for abdominal pain and vomiting  Genitourinary: Negative for dysuria and hematuria  Musculoskeletal: Negative for arthralgias and back pain  Skin: Negative for color change and rash  Neurological: Negative for seizures and syncope  All other systems reviewed and are negative  Objective:      /72 (BP Location: Left arm, Patient Position: Sitting, Cuff Size: Standard)   Pulse 58   Temp 97 8 °F (36 6 °C) (Temporal)   Resp 18   Ht 5' 3" (1 6 m)   Wt 81 2 kg (179 lb)   BMI 31 71 kg/m²          Physical Exam  Constitutional:       Appearance: She is well-developed  HENT:      Head: Normocephalic and atraumatic  Eyes:      Conjunctiva/sclera: Conjunctivae normal       Pupils: Pupils are equal, round, and reactive to light  Neck:      Musculoskeletal: Normal range of motion and neck supple  Cardiovascular:      Rate and Rhythm: Normal rate and regular rhythm  Pulmonary:      Effort: Pulmonary effort is normal       Breath sounds: Normal breath sounds     Abdominal:      General: Bowel sounds are normal       Palpations: Abdomen is soft  Tenderness: There is abdominal tenderness in the right upper quadrant and right lower quadrant  Comments:  As described above   Musculoskeletal: Normal range of motion  Skin:     General: Skin is warm and dry  Neurological:      Mental Status: She is alert and oriented to person, place, and time  Psychiatric:         Behavior: Behavior normal          Thought Content:  Thought content normal          Judgment: Judgment normal

## 2021-05-03 NOTE — ASSESSMENT & PLAN NOTE
- Patient has vague complaints of episodes of lightheadedness/pre-syncopal symptoms, chronic intermittent palpitations, hair loss recently  - No focal deficits on exam today however chronic left-sided facial droop mentioned for months after dental procedure, unclear if due to local nerve palsy related to dental procedure versus possible CVA in past  - Focus of today was abdominal pain, however advised follow-up with her PCP soon to investigate this, consider MRI brain   - Blood work ordered

## 2021-05-03 NOTE — TELEPHONE ENCOUNTER
Spoke with patient and she is refusing to set up another appointment right now  She said she has some testing she needs to get done and then she will see about addressing the lightheadedness and drooping after that

## 2021-05-03 NOTE — TELEPHONE ENCOUNTER
Please schedule a sooner follow-up for this patient, see my note from today but she's been having episodes of lightheadedness and then she's noticed left sided facial droop for the past several months  I ordered some basic blood work but today we focused on getting her abdominal pain taken care of- but she should be seen soon for this, possible MRI brain  Thanks!

## 2021-05-03 NOTE — PATIENT INSTRUCTIONS
1  Take Tylenol daily as directed for 3 days   2  Schedule Abdominal ultrasound  3  Schedule gastroenterology consultation with Dr Deana Matt  4  2 week follow-up with General surgery or sooner on an as-needed basis

## 2021-05-03 NOTE — PROGRESS NOTES
Assessment/Plan:       Problem List Items Addressed This Visit        Digestive    Primary gall bladder adenocarcinoma (HCC)       Other    RUQ abdominal pain - Primary     - Waxing and waning RUQ abdominal pain of unclear etiology, radiating to right thoracic back and flank as well as epigastric area, exacerbated by movement and coughing  - Nonspecific pain on exam today, no rebound or guarding   - Given prior surgery and incision in area of pain, question hernia as etiology of pain   - Referral for patient to meet with general surgery today          Relevant Orders    TSH, 3rd generation with Free T4 reflex    Comprehensive metabolic panel    CBC and differential    Ambulatory referral to General Surgery    Lightheadedness     - Patient has vague complaints of episodes of lightheadedness/pre-syncopal symptoms, chronic intermittent palpitations, hair loss recently  - No focal deficits on exam today however chronic left-sided facial droop mentioned for months after dental procedure, unclear if due to local nerve palsy related to dental procedure versus possible CVA in past  - Focus of today was abdominal pain, however advised follow-up with her PCP soon to investigate this, consider MRI brain   - Blood work ordered                 Subjective:      Patient ID: Jose Wilkinson is a 80 y o  female  HPI     Abdominal pain- Started as a jabbing pain, started in RUQ, goes to entire stomach, hurts with coughing or moving a certain way  3-4 weeks  No inciting event  Worse with movement, wraps around to right back  Standing up, walking, coughing makes it worse, not worse with bowel movement  Some days good, some days bad, no pattern to this  Similar pain after surgery but not exactly the same, surgery was a couple years ago  Eating normally, no nausea or vomiting  Normal bowel movements  No dysuria, hematuria, no frequency or urgency  Coughing over last couple days associated with allergic rhinitis/seasonal allergies  Lightheadedness- funny feeling, occurs with standing, just comes on, started within the past week, occurs with standing, lasts for a few seconds, feels like she's going to pass out/feels sick  Some days twice, some days none  Last episode was yesterday  She gets a weird feeling with this  No chest pain, feels like her heart is fluttering but this is chronic (different from lightheadedness ) Cloudy vision with this which resolves  Not associated with abdominal pain  Her oncologist ordered blood work and a CT as below:     CBC normal, CMP, tumor markers normal      Had a CT abdomen 4/30/2021 [facility]- Prior cholecystectomy, history of primary gall bladder adenocarcinoma  "Impression:   1  Postsurgical changes status post cholecystectomy  No evidence of new,  recurrent, or metastatic disease  No significant change since 11/16/2020   2  Several small pulmonary nodules measuring up to 6 mm in diameter, unchanged  from multiple prior examinations, and most likely benign in etiology  Mild  centrilobular emphysema  3  Colonic diverticulosis without evidence of acute diverticulitis "    She has noticed she has lost a lot of hair, with showering and brushing her hair in the drain, hair loss past week  Muscle cramps past 2-3 days in her feet and calf muscles, with sitting  The following portions of the patient's history were reviewed and updated as appropriate: allergies, current medications, past family history, past medical history, past social history, past surgical history and problem list     Review of Systems   Constitutional: Positive for appetite change  Negative for chills and fever  Eyes: Positive for visual disturbance  Respiratory: Positive for cough  Gastrointestinal: Positive for abdominal pain  Negative for abdominal distention, constipation, diarrhea, nausea and vomiting  Endocrine: Negative for cold intolerance and heat intolerance     Genitourinary: Negative for decreased urine volume, difficulty urinating, dysuria, frequency and urgency  Musculoskeletal: Negative for back pain  Neurological: Positive for light-headedness  Negative for dizziness, weakness, numbness and headaches  Objective:      /70 (BP Location: Left arm, Patient Position: Sitting, Cuff Size: Standard)   Pulse 55   Temp 97 9 °F (36 6 °C)   Ht 5' 3" (1 6 m)   Wt 81 3 kg (179 lb 3 2 oz)   SpO2 97%   BMI 31 74 kg/m²          Physical Exam  Vitals signs reviewed  Constitutional:       General: She is not in acute distress  Appearance: She is well-developed  She is not ill-appearing, toxic-appearing or diaphoretic  HENT:      Head: Normocephalic and atraumatic  Mouth/Throat:      Mouth: Mucous membranes are moist       Pharynx: No oropharyngeal exudate or posterior oropharyngeal erythema  Eyes:      General:         Right eye: No discharge  Left eye: No discharge  Extraocular Movements: Extraocular movements intact  Conjunctiva/sclera: Conjunctivae normal       Pupils: Pupils are equal, round, and reactive to light  Neck:      Musculoskeletal: Normal range of motion and neck supple  No neck rigidity  Cardiovascular:      Rate and Rhythm: Normal rate and regular rhythm  Heart sounds: Normal heart sounds  No murmur  No friction rub  Pulmonary:      Effort: Pulmonary effort is normal  No respiratory distress  Breath sounds: Normal breath sounds  No stridor  No wheezing or rhonchi  Abdominal:      General: Bowel sounds are normal  There is no distension  Palpations: Abdomen is soft  There is no mass  Tenderness: There is abdominal tenderness  There is no guarding or rebound  Comments: RUQ scar, tenderness to palpation RUQ, RLQ, epigastric region, LUQ, no rebound or guarding    Skin:     General: Skin is warm  Coloration: Skin is not pale  Findings: No erythema or rash     Neurological:      Mental Status: She is alert and oriented to person, place, and time  Cranial Nerves: Facial asymmetry present  No cranial nerve deficit or dysarthria  Sensory: No sensory deficit  Motor: No weakness, atrophy or abnormal muscle tone        Comments: Very slight left facial droop at corner of mouth, otherwise no focal deficits on exam   Psychiatric:         Mood and Affect: Mood normal          Behavior: Behavior normal            Marjorie Davila DO  [facility]

## 2021-05-03 NOTE — LETTER
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 4 5 hepatectomy and lymphadenectomy in August 2017 presenting to the office with a 3-4 week history of waxing and waning right upper quadrant abdominal pain  A     Patient reports that over the past 3-4 weeks he has had constant pain in the right upper quadrant which has waxed and waned in intensity from bearable to unbearable at times  She reports some radiation of pain through to her back  She contacted her oncologist to ordered a CT scan of the abdomen and pelvis performed on April 30, 2021  This was a normal study with the expected postoperative changes in the right upper quadrant but no signs of locally recurrent carcinoma  She also had blood work that confirmed the presence of a normal CA 19 9 level  She does not recall ever having had chickenpox  She denies a history of shingles  Today on physical examination the patient is well appearing  She appears comfortable here in the office  She is awake alert oriented  Pleasant competent reliable as a historian  Her abdomen is soft, rotund  She has a well-healed right subcostal incision in the right upper quadrant  No evidence for ventral incisional hernia    Inspection of the skin reveals no evidence of cutaneous manifestation of herpes zoster  Palpation reveals mild right-sided abdominal pain in the upper and lower quadrants equally  No true guarding rebound or peritoneal signs  The patient has one-month history of chronic recurrent right upper quadrant and right lower quadrant abdominal pain of uncertain origin  To investigate her complaints I have recommended a stat ultrasound of the abdomen  Additionally I have advised that she obtain a consultation with Gastroenterology for consideration for both EGD and colonoscopy  I look for to seeing her back in 2 weeks time  I have encouraged her to follow up sooner on an as-needed basis  Primary gall bladder adenocarcinoma ([address] )    Recent lab work and CT imaging reveals no evidence of recurrence of her stage IIIB gallbladder carcinoma status post segment 4 5 hepatectomy and lymphadenectomy in August 2017  Subjective:      Patient ID: Lyndsay Carr is a 80 y o  female  Patient is here today for right upper quadrant abdominal pain that has been present for 3-4 weeks  Denied nausea,vomting,fever or chills  Georgia Hollins MA          Review of Systems   Constitutional: Negative for chills and fever  HENT: Negative for ear pain and sore throat  Eyes: Negative for pain and visual disturbance  Respiratory: Negative for cough and shortness of breath  Cardiovascular: Negative for chest pain and palpitations  Gastrointestinal: Negative for abdominal pain and vomiting  Genitourinary: Negative for dysuria and hematuria  Musculoskeletal: Negative for arthralgias and back pain  Skin: Negative for color change and rash  Neurological: Negative for seizures and syncope  All other systems reviewed and are negative          Objective:      /72 (BP Location: Left arm, Patient Position: Sitting, Cuff Size: Standard)   Pulse 58   Temp 97 8 °F (36 6 °C) (Temporal)   Resp 18   Ht 5' 3" (1 6 m)   Wt 81 2 kg (179 lb)   BMI 31 71 kg/m² Physical Exam  Constitutional:       Appearance: She is well-developed  HENT:      Head: Normocephalic and atraumatic  Eyes:      Conjunctiva/sclera: Conjunctivae normal       Pupils: Pupils are equal, round, and reactive to light  Neck:      Musculoskeletal: Normal range of motion and neck supple  Cardiovascular:      Rate and Rhythm: Normal rate and regular rhythm  Pulmonary:      Effort: Pulmonary effort is normal       Breath sounds: Normal breath sounds  Abdominal:      General: Bowel sounds are normal       Palpations: Abdomen is soft  Tenderness: There is abdominal tenderness in the right upper quadrant and right lower quadrant  Comments:  As described above   Musculoskeletal: Normal range of motion  Skin:     General: Skin is warm and dry  Neurological:      Mental Status: She is alert and oriented to person, place, and time  Psychiatric:         Behavior: Behavior normal          Thought Content:  Thought content normal          Judgment: Judgment normal

## 2021-05-03 NOTE — ASSESSMENT & PLAN NOTE
- Waxing and waning RUQ abdominal pain of unclear etiology, radiating to right thoracic back and flank as well as epigastric area, exacerbated by movement and coughing  - Nonspecific pain on exam today, no rebound or guarding   - Given prior surgery and incision in area of pain, question hernia as etiology of pain   - Referral for patient to meet with general surgery today

## 2021-05-05 ENCOUNTER — TELEPHONE (OUTPATIENT)
Dept: SURGERY | Facility: CLINIC | Age: 71
End: 2021-05-05

## 2021-05-05 NOTE — TELEPHONE ENCOUNTER
Called patient in follow up in regards to her appointment on 05-  Stated that she was satisfied and has her ultrasound scheduled for 5-  Follow up appointment confirmed

## 2021-05-06 ENCOUNTER — HOSPITAL ENCOUNTER (OUTPATIENT)
Dept: ULTRASOUND IMAGING | Facility: HOSPITAL | Age: 71
Discharge: HOME | End: 2021-05-06
Attending: SURGERY
Payer: COMMERCIAL

## 2021-05-06 DIAGNOSIS — C23 PRIMARY GALL BLADDER ADENOCARCINOMA (HCC): ICD-10-CM

## 2021-05-06 DIAGNOSIS — R10.11 RIGHT UPPER QUADRANT PAIN: ICD-10-CM

## 2021-05-06 PROCEDURE — 76700 US EXAM ABDOM COMPLETE: CPT

## 2021-05-12 ENCOUNTER — TELEPHONE (OUTPATIENT)
Dept: SURGERY | Facility: CLINIC | Age: 71
End: 2021-05-12

## 2021-05-12 NOTE — TELEPHONE ENCOUNTER
Called and left a voicemail with contact information to review the results of the patient's ultrasound report

## 2021-05-12 NOTE — TELEPHONE ENCOUNTER
Patient had an U/S of the abdomen done, she would like a call back with the results  She hasn't heard anything and she doesn't want to wait until here follow up appointment  Patient stated that it would be ok to leave her a message with the results if you wanted

## 2021-09-17 ENCOUNTER — APPOINTMENT (EMERGENCY)
Dept: CT IMAGING | Facility: HOSPITAL | Age: 71
End: 2021-09-17
Payer: COMMERCIAL

## 2021-09-17 ENCOUNTER — HOSPITAL ENCOUNTER (INPATIENT)
Facility: HOSPITAL | Age: 71
LOS: 2 days | Discharge: HOME | DRG: 065 | End: 2021-09-19
Attending: ANESTHESIOLOGY | Admitting: ANESTHESIOLOGY
Payer: COMMERCIAL

## 2021-09-17 ENCOUNTER — HOSPITAL ENCOUNTER (EMERGENCY)
Facility: HOSPITAL | Age: 71
End: 2021-09-17
Attending: EMERGENCY MEDICINE | Admitting: EMERGENCY MEDICINE
Payer: COMMERCIAL

## 2021-09-17 VITALS
DIASTOLIC BLOOD PRESSURE: 54 MMHG | OXYGEN SATURATION: 95 % | TEMPERATURE: 97.6 F | WEIGHT: 175.5 LBS | HEIGHT: 63 IN | BODY MASS INDEX: 31.1 KG/M2 | SYSTOLIC BLOOD PRESSURE: 115 MMHG | HEART RATE: 53 BPM | RESPIRATION RATE: 18 BRPM

## 2021-09-17 DIAGNOSIS — I63.9 STROKE (HCC): Primary | ICD-10-CM

## 2021-09-17 PROBLEM — K21.9 GERD (GASTROESOPHAGEAL REFLUX DISEASE): Status: ACTIVE | Noted: 2021-09-17

## 2021-09-17 PROBLEM — H40.9 GLAUCOMA: Status: ACTIVE | Noted: 2021-09-17

## 2021-09-17 LAB
ANION GAP SERPL CALCULATED.3IONS-SCNC: 9 MMOL/L (ref 4–13)
APTT PPP: 28 SECONDS (ref 23–37)
ATRIAL RATE: 74 BPM
BUN SERPL-MCNC: 14 MG/DL (ref 7–25)
CALCIUM SERPL-MCNC: 9.4 MG/DL (ref 8.6–10.5)
CHLORIDE SERPL-SCNC: 104 MMOL/L (ref 98–107)
CO2 SERPL-SCNC: 28 MMOL/L (ref 21–31)
CREAT SERPL-MCNC: 0.74 MG/DL (ref 0.6–1.2)
ERYTHROCYTE [DISTWIDTH] IN BLOOD BY AUTOMATED COUNT: 14.1 % (ref 11.5–14.5)
GFR SERPL CREATININE-BSD FRML MDRD: 82 ML/MIN/1.73SQ M
GLUCOSE SERPL-MCNC: 86 MG/DL (ref 65–140)
GLUCOSE SERPL-MCNC: 96 MG/DL (ref 65–99)
HCT VFR BLD AUTO: 42.6 % (ref 42–47)
HGB BLD-MCNC: 14.6 G/DL (ref 12–16)
INR PPP: 1.03 (ref 0.84–1.19)
MCH RBC QN AUTO: 32.6 PG (ref 26–34)
MCHC RBC AUTO-ENTMCNC: 34.2 G/DL (ref 31–37)
MCV RBC AUTO: 95 FL (ref 81–99)
P AXIS: 72 DEGREES
PLATELET # BLD AUTO: 229 THOUSANDS/UL (ref 149–390)
PMV BLD AUTO: 7.6 FL (ref 8.6–11.7)
POTASSIUM SERPL-SCNC: 4.2 MMOL/L (ref 3.5–5.5)
PR INTERVAL: 166 MS
PROTHROMBIN TIME: 13.6 SECONDS (ref 11.6–14.5)
QRS AXIS: 56 DEGREES
QRSD INTERVAL: 74 MS
QT INTERVAL: 408 MS
QTC INTERVAL: 452 MS
RBC # BLD AUTO: 4.47 MILLION/UL (ref 3.9–5.2)
SARS-COV-2 RNA RESP QL NAA+PROBE: NEGATIVE
SODIUM SERPL-SCNC: 141 MMOL/L (ref 134–143)
T WAVE AXIS: 56 DEGREES
TROPONIN I SERPL-MCNC: <0.03 NG/ML
VENTRICULAR RATE: 74 BPM
WBC # BLD AUTO: 7.8 THOUSAND/UL (ref 4.8–10.8)

## 2021-09-17 PROCEDURE — 84484 ASSAY OF TROPONIN QUANT: CPT | Performed by: EMERGENCY MEDICINE

## 2021-09-17 PROCEDURE — 82948 REAGENT STRIP/BLOOD GLUCOSE: CPT

## 2021-09-17 PROCEDURE — U0005 INFEC AGEN DETEC AMPLI PROBE: HCPCS | Performed by: EMERGENCY MEDICINE

## 2021-09-17 PROCEDURE — 93010 ELECTROCARDIOGRAM REPORT: CPT | Performed by: INTERNAL MEDICINE

## 2021-09-17 PROCEDURE — 96361 HYDRATE IV INFUSION ADD-ON: CPT

## 2021-09-17 PROCEDURE — U0003 INFECTIOUS AGENT DETECTION BY NUCLEIC ACID (DNA OR RNA); SEVERE ACUTE RESPIRATORY SYNDROME CORONAVIRUS 2 (SARS-COV-2) (CORONAVIRUS DISEASE [COVID-19]), AMPLIFIED PROBE TECHNIQUE, MAKING USE OF HIGH THROUGHPUT TECHNOLOGIES AS DESCRIBED BY CMS-2020-01-R: HCPCS | Performed by: EMERGENCY MEDICINE

## 2021-09-17 PROCEDURE — 99291 CRITICAL CARE FIRST HOUR: CPT | Performed by: EMERGENCY MEDICINE

## 2021-09-17 PROCEDURE — 85027 COMPLETE CBC AUTOMATED: CPT | Performed by: EMERGENCY MEDICINE

## 2021-09-17 PROCEDURE — 36415 COLL VENOUS BLD VENIPUNCTURE: CPT | Performed by: EMERGENCY MEDICINE

## 2021-09-17 PROCEDURE — 96360 HYDRATION IV INFUSION INIT: CPT

## 2021-09-17 PROCEDURE — 85610 PROTHROMBIN TIME: CPT | Performed by: EMERGENCY MEDICINE

## 2021-09-17 PROCEDURE — 70498 CT ANGIOGRAPHY NECK: CPT

## 2021-09-17 PROCEDURE — 70496 CT ANGIOGRAPHY HEAD: CPT

## 2021-09-17 PROCEDURE — 80048 BASIC METABOLIC PNL TOTAL CA: CPT | Performed by: EMERGENCY MEDICINE

## 2021-09-17 PROCEDURE — 99223 1ST HOSP IP/OBS HIGH 75: CPT | Performed by: STUDENT IN AN ORGANIZED HEALTH CARE EDUCATION/TRAINING PROGRAM

## 2021-09-17 PROCEDURE — 99291 CRITICAL CARE FIRST HOUR: CPT

## 2021-09-17 PROCEDURE — 85730 THROMBOPLASTIN TIME PARTIAL: CPT | Performed by: EMERGENCY MEDICINE

## 2021-09-17 PROCEDURE — 93005 ELECTROCARDIOGRAM TRACING: CPT

## 2021-09-17 RX ORDER — TRAVOPROST OPHTHALMIC SOLUTION 0.04 MG/ML
1 SOLUTION OPHTHALMIC
Status: DISCONTINUED | OUTPATIENT
Start: 2021-09-17 | End: 2021-09-19 | Stop reason: HOSPADM

## 2021-09-17 RX ORDER — ACETAMINOPHEN 325 MG/1
650 TABLET ORAL EVERY 6 HOURS PRN
Status: DISCONTINUED | OUTPATIENT
Start: 2021-09-17 | End: 2021-09-19 | Stop reason: HOSPADM

## 2021-09-17 RX ORDER — ATORVASTATIN CALCIUM 40 MG/1
40 TABLET, FILM COATED ORAL EVERY EVENING
Status: DISCONTINUED | OUTPATIENT
Start: 2021-09-18 | End: 2021-09-19 | Stop reason: HOSPADM

## 2021-09-17 RX ORDER — SODIUM CHLORIDE 9 MG/ML
100 INJECTION, SOLUTION INTRAVENOUS CONTINUOUS
Status: DISCONTINUED | OUTPATIENT
Start: 2021-09-17 | End: 2021-09-17 | Stop reason: HOSPADM

## 2021-09-17 RX ADMIN — SODIUM CHLORIDE 100 ML/HR: 0.9 INJECTION, SOLUTION INTRAVENOUS at 16:38

## 2021-09-17 RX ADMIN — IOHEXOL 85 ML: 350 INJECTION, SOLUTION INTRAVENOUS at 13:02

## 2021-09-17 RX ADMIN — ALTEPLASE 64.5 MG: KIT at 13:41

## 2021-09-17 RX ADMIN — SODIUM CHLORIDE 50 ML: 9 INJECTION, SOLUTION INTRAVENOUS at 14:44

## 2021-09-17 NOTE — ED PROVIDER NOTES
History  Chief Complaint   Patient presents with   [address]     right sided tingling and weakness that began approximatly an hour and a half ago     This is a 62-year-old female who presents emergency department with sudden onset of right facial right arm and right leg numbness  Unsteady gait per daughter  Patient does note word finding difficulties  Daughter states that she last saw her mother well at 10:40 a m  today  Symptoms occurred after that  No prior history of stroke per patient  No history of recent abnormal bleeding  No history of neoplasm in brain or aneurysm  No recent head injury  No recent surgeries  Symptoms are moderate severity  immediately upon going to room a stroke alert was called  Patient denies any chest pain or pressure at this time  No shortness of breath  Notes vision seems slightly blurred at times  No other aggravating or alleviating factors  Patient required my immediate presence at bedside  Prior to Admission Medications   Prescriptions Last Dose Informant Patient Reported? Taking?    Spiriva Respimat 2 5 MCG/ACT AERS inhaler   Yes No   Patient not taking: Reported on 2021   acetaminophen (TYLENOL) 325 mg tablet  Self Yes No   Sig: Take by mouth as needed    albuterol (PROVENTIL HFA,VENTOLIN HFA) 90 mcg/act inhaler   Yes No   Sig: Inhale 2 puffs every 6 (six) hours as needed   Patient not taking: Reported on 2021   mometasone (NASONEX) 50 mcg/act nasal spray   No No   Si sprays into each nostril daily   Patient not taking: Reported on 5/3/2021   omeprazole (PriLOSEC) 20 mg delayed release capsule  Self No No   Sig: Take 1 capsule (20 mg total) by mouth daily   Patient taking differently: Take 40 mg by mouth daily    tiotropium (Spiriva Respimat) 2 5 MCG/ACT AERS inhaler   Yes No   Sig: Inhale daily   Patient not taking: Reported on 2021   travoprost (TRAVATAN-Z) 0 004 % ophthalmic solution  Self Yes No   Si drop daily at bedtime Facility-Administered Medications: None       Past Medical History:   Diagnosis Date    Cancer [address] [address] )     gallbladder    COVID-19     GERD (gastroesophageal reflux disease)     Glaucoma     History of gallbladder cancer     Approx  3 years ago    Hyperlipemia     Murmur     Rheumatic fever        Past Surgical History:   Procedure Laterality Date    CHOLECYSTECTOMY      TONSILLECTOMY  CHILDHOOD       Family History   Problem Relation Age of Onset    Heart attack Mother     Kidney disease Father     Breast cancer Sister 77    No Known Problems Daughter     No Known Problems Maternal Grandmother     No Known Problems Paternal Grandmother     No Known Problems Sister     No Known Problems Maternal Aunt     No Known Problems Maternal Aunt     No Known Problems Maternal Aunt     No Known Problems Paternal Aunt     No Known Problems Paternal Aunt      I have reviewed and agree with the history as documented  E-Cigarette/Vaping    E-Cigarette Use Never User      E-Cigarette/Vaping Substances    Nicotine No     THC No     CBD No     Flavoring No     Other No     Unknown No      Social History     Tobacco Use    Smoking status: Former Smoker    Smokeless tobacco: Never Used   Vaping Use    Vaping Use: Never used   Substance Use Topics    Alcohol use: Never    Drug use: Never       Review of Systems   Constitutional: Negative for activity change, appetite change and fever  HENT: Negative for congestion, ear pain, rhinorrhea and sore throat  Eyes: Positive for visual disturbance  Negative for pain and redness  Respiratory: Negative for cough, shortness of breath and wheezing  Cardiovascular: Negative for chest pain and palpitations  Gastrointestinal: Negative for abdominal pain, diarrhea, nausea and vomiting  Endocrine: Negative for polyuria  Genitourinary: Negative for difficulty urinating, dysuria, frequency and urgency     Musculoskeletal: Negative for arthralgias and myalgias  Skin: Negative for color change and rash  Allergic/Immunologic: Negative for immunocompromised state  Neurological: Positive for speech difficulty, weakness, numbness and headaches  Negative for dizziness, syncope, facial asymmetry (Intermittent) and light-headedness  Hematological: Does not bruise/bleed easily  Psychiatric/Behavioral: Negative for confusion  All other systems reviewed and are negative  Physical Exam  Physical Exam  Vitals and nursing note reviewed  Constitutional:       General: She is not in acute distress  Appearance: She is well-developed  HENT:      Head: Normocephalic and atraumatic  Nose: Nose normal    Eyes:      General: No scleral icterus  Conjunctiva/sclera: Conjunctivae normal    Cardiovascular:      Rate and Rhythm: Normal rate and regular rhythm  Heart sounds: Normal heart sounds  Pulmonary:      Effort: Pulmonary effort is normal  No respiratory distress  Breath sounds: Normal breath sounds  No stridor  No wheezing  Abdominal:      General: There is no distension  Palpations: Abdomen is soft  Tenderness: There is no abdominal tenderness  There is no guarding or rebound  Musculoskeletal:         General: No deformity  Cervical back: Normal range of motion and neck supple  Skin:     General: Skin is warm and dry  Findings: No rash  Neurological:      Mental Status: She is alert and oriented to person, place, and time  Cranial Nerves: Cranial nerve deficit present  Sensory: Sensory deficit present  Motor: Weakness present  Comments: Right facial numbness noted on exam   No facial droop noted  EOMI  No right pronator drift  No ataxia on right or left with finger to nose  Right leg drift is noted  No ataxia with heel-to-shin bilaterally  Patient does have some word finding difficulties  Speech is clear  Psychiatric:         Thought Content:  Thought content normal          Vital Signs  ED Triage Vitals [09/17/21 1251]   Temperature Pulse Respirations Blood Pressure SpO2   97 6 °F (36 4 °C) 63 18 (!) 178/74 100 %      Temp Source Heart Rate Source Patient Position - Orthostatic VS BP Location FiO2 (%)   Temporal Monitor Lying Left arm --      Pain Score       No Pain           Vitals:    09/17/21 1800 09/17/21 1815 09/17/21 1830 09/17/21 1915   BP: 110/56 105/52 124/58 115/54   Pulse: 56 59 57 (!) 53   Patient Position - Orthostatic VS:             Visual Acuity  Visual Acuity      Most Recent Value   L Pupil Size (mm)  3   R Pupil Size (mm)  3          ED Medications  Medications   iohexol (OMNIPAQUE) 350 MG/ML injection (SINGLE-DOSE) 85 mL (85 mL Intravenous Given 9/17/21 1302)   alteplase (ACTIVASE) IV bolus 7 164 mg (7 164 mg Intravenous Given 9/17/21 1338)     Followed by   alteplase (ACTIVASE) infusion 64 5 mg (0 mg/kg × 79 6 kg Intravenous Stopped 9/17/21 1441)     Followed by   sodium chloride 0 9 % bolus 50 mL (50 mL Intravenous Given 9/17/21 1444)       Diagnostic Studies  Results Reviewed     Procedure Component Value Units Date/Time    Novel Coronavirus (Covid-19),PCR [facility] - 2 hour stat [[id number]]  (Normal) Collected: 09/17/21 1405    Lab Status: Final result Specimen: Nares from Nose Updated: 09/17/21 1501     SARS-CoV-2 Negative    Narrative: The specimen collection materials, transport medium, and/or testing methodology utilized in the production of these test results have been proven to be reliable in a limited validation with an abbreviated program under the Emergency Utilization Authorization provided by the FDA  Testing reported as "Presumptive positive" will be confirmed with secondary testing to ensure result accuracy  Clinical caution and judgement should be used with the interpretation of these results with consideration of the clinical impression and other laboratory testing    Testing reported as "Positive" or "Negative" has been proven to be accurate according to standard laboratory validation requirements  All testing is performed with control materials showing appropriate reactivity at standard intervals        Troponin I [[id number]]  (Normal) Collected: 09/17/21 1300    Lab Status: Final result Specimen: Blood Updated: 09/17/21 1325     Troponin I <0 03 ng/mL     Basic metabolic panel [[id number]] Collected: 09/17/21 1300    Lab Status: Final result Specimen: Blood Updated: 09/17/21 1323     Sodium 141 mmol/L      Potassium 4 2 mmol/L      Chloride 104 mmol/L      CO2 28 mmol/L      ANION GAP 9 mmol/L      BUN 14 mg/dL      Creatinine 0 74 mg/dL      Glucose 96 mg/dL      Calcium 9 4 mg/dL      eGFR 82 ml/min/1 73sq m     Narrative:      [place] guidelines for Chronic Kidney Disease (CKD):     Stage 1 with normal or high GFR (GFR > 90 mL/min/1 73 square meters)    Stage 2 Mild CKD (GFR = 60-89 mL/min/1 73 square meters)    Stage 3A Moderate CKD (GFR = 45-59 mL/min/1 73 square meters)    Stage 3B Moderate CKD (GFR = 30-44 mL/min/1 73 square meters)    Stage 4 Severe CKD (GFR = 15-29 mL/min/1 73 square meters)    Stage 5 End Stage CKD (GFR <15 mL/min/1 73 square meters)  Note: GFR calculation is accurate only with a steady state creatinine    Protime-INR [[id number]]  (Normal) Collected: 09/17/21 1300    Lab Status: Final result Specimen: Blood Updated: 09/17/21 1317     Protime 13 6 seconds      INR 1 03    APTT [[id number]]  (Normal) Collected: 09/17/21 1300    Lab Status: Final result Specimen: Blood Updated: 09/17/21 1317     PTT 28 seconds     CBC and Platelet [[id number]]  (Abnormal) Collected: 09/17/21 1300    Lab Status: Final result Specimen: Blood Updated: 09/17/21 1314     WBC 7 80 Thousand/uL      RBC 4 47 Million/uL      Hemoglobin 14 6 g/dL      Hematocrit 42 6 %      MCV 95 fL      MCH 32 6 pg      MCHC 34 2 g/dL      RDW 14 1 %      Platelets 487 Thousands/uL      MPV 7 6 fL     Fingerstick Glucose (POCT) [[id number]] (Normal) Collected: 09/17/21 1248    Lab Status: Final result Updated: 09/17/21 1249     POC Glucose 86 mg/dl                  CTA stroke alert (head/neck)   Final Result by Jose Hall MD (09/17 1328)      Negative CTA head and neck for large vessel occlusion, dissection, aneurysm, or high-grade stenosis  Mild atherosclerotic disease of the head and neck, as detailed above      Additional chronic/incidental findings as detailed above  Findings were directly discussed with Sonia Auguste at 1:23 PM                         Workstation performed: [id number]         CT stroke alert brain   Final Result by Jose Hall MD (09/17 1328)      No acute intracranial abnormality  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 and mild microangiopathic change        Findings were directly discussed with Sonia Auguste at 1:23 PM          Workstation performed: [id number]                    Procedures  CriticalCare Time  Performed by: Ashley Lazar MD  Authorized by: Ashley Lazar MD     Critical care provider statement:     Critical care time (minutes):  45    Critical care time was exclusive of:  Separately billable procedures and treating other patients and teaching time    Critical care was necessary to treat or prevent imminent or life-threatening deterioration of the following conditions:  CNS failure or compromise    Critical care was time spent personally by me on the following activities:  Obtaining history from patient or surrogate, development of treatment plan with patient or surrogate, discussions with consultants, examination of patient, evaluation of patient's response to treatment, interpretation of cardiac output measurements, ordering and performing treatments and interventions, ordering and review of laboratory studies, ordering and review of radiographic studies and re-evaluation of patient's condition             ED Course  ED Course as of Sep 22 1545   Fri Sep 17, 2021   1256 Discussed with daughter  No contraindications to TPA found with discussion with daughter  Last known well was 10:40am  Spoke with Stroke neurologist and at this time patient does meet symptom criteria for TPA  Patient has gone to CT  Awaiting CT results  1308 Discussed risk of tPA with patient and daughter  She was advised risks of severe bleeding, including intracranial hemorrhage, and possibility of no improvement of symptoms  At this time she is agreeable to tPA and would like us to infuse tPA if she is a candidate once CT scan has come back  Nurse was advised to mix TPA in preparation  [id number] D/w radiology  No acute infarct or bleed  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  CTA no large vessel occlusion, stenosis or dissection  1327 Patient still agreeable to TPA   systolic  Labs WNL  Nurse is initiating TPA  1340 TPA just over 60 minutes door to infusion time - High ED volume with need for bed to be cleared upon arrival of patient to ED       1358 Patient accepted By Dr Salomón Park at [facility]                      Stroke Assessment     Row Name 09/17/21 1302             NIH Stroke Scale    Interval  Baseline      Level of Consciousness (1a )  0      LOC Questions (1b )  0      LOC Commands (1c )  0      Best Gaze (2 )  0      Visual (3 )  0      Facial Palsy (4 )  0      Motor Arm, Left (5a )  0      Motor Arm, Right (5b )  0      Motor Leg, Left (6a )  0      Motor Leg, Right (6b )  1      Limb Ataxia (7 )  0      Sensory (8 )  1      Best Language (9 )  1      Dysarthria (10 )  0      Extinction and Inattention (11 ) (Formerly Neglect)  0      Total  3                                  MDM  Number of Diagnoses or Management Options     Amount and/or Complexity of Data Reviewed  Clinical lab tests: ordered and reviewed  Tests in the radiology section of CPT®: ordered and reviewed  Discuss the patient with other providers: yes        Disposition  Final diagnoses:   Stroke [facility])     Time reflects when diagnosis was documented in both MDM as applicable and the Disposition within this note     Time User Action Codes Description Comment    9/17/2021 12:56 PM Valery Mcmullen Add [I63 9] Stroke [facility])       ED Disposition     ED Disposition Condition Date/Time Comment    Transfer to Another Facility-In [facility] Sep 17, 2021  2:18 PM Kamala Jose should be transferred out to [facility] - Dr Tal Angel MD Documentation      Most Recent Value   Patient Condition  The patient has been stabilized such that within reasonable medical probability, no material deterioration of the patient condition or the condition of the unborn child(mahnaz) is likely to result from the transfer   Reason for Transfer  Level of Care needed not available at this facility [facility] monitoring post TPA]   Benefits of Transfer  Specialized equipment and/or services available at the receiving facility (Include comment)________________________ [facility]   Risks of Transfer  Potential for delay in receiving treatment, Potential deterioration of medical condition, Loss of IV, Increased discomfort during transfer, Possible worsening of condition or death during transfer   Accepting Physician  [address] Name, [address]    (Name & Tel number)  [facility]   Transported by [employer] and Unit #)  SLETS or designee   Sending MD lock   Provider Certification  General risk, such as traffic hazards, adverse weather conditions, rough terrain or turbulence, possible failure of equipment (including vehicle or aircraft), or consequences of actions of persons outside the control of the transport personnel, Unanticipated needs of medical equipment and personnel during transport, Risk of worsening condition, The possibility of a transport vehicle being unavailable      RN Documentation      Most [facility] Name, [address]   SL[facility]    (Name & Tel number)  [facility] - [place]   [facility] and [facility]  [facility] or designee      Follow-up Information    None         Discharge Medication List as of 9/17/2021  8:13 PM      CONTINUE these medications which have NOT CHANGED    Details   acetaminophen (TYLENOL) 325 mg tablet Take by mouth as needed , Historical Med      albuterol (PROVENTIL HFA,VENTOLIN HFA) 90 mcg/act inhaler Inhale 2 puffs every 6 (six) hours as needed, Starting Tue 12/8/2020, Historical Med      mometasone (NASONEX) 50 mcg/act nasal spray 2 sprays into each nostril daily, Starting Mon 3/8/2021, Normal      omeprazole (PriLOSEC) 20 mg delayed release capsule Take 1 capsule (20 mg total) by mouth daily, Starting Mon 3/8/2021, Normal      !! Spiriva Respimat 2 5 MCG/ACT AERS inhaler Starting Tue 12/8/2020, Historical Med      !! tiotropium (Spiriva Respimat) 2 5 MCG/ACT AERS inhaler Inhale daily, Starting Tue 12/8/2020, Until Wed 12/8/2021, Historical Med      travoprost (TRAVATAN-Z) 0 004 % ophthalmic solution 1 drop daily at bedtime, Historical Med      atorvastatin (LIPITOR) 20 mg tablet Take 1 tablet (20 mg total) by mouth daily, Starting Mon 3/8/2021, Normal      valACYclovir (VALTREX) 1,000 mg tablet Take 1 tablet (1,000 mg total) by mouth 2 (two) times a day for 1 day, Starting Fri 12/4/2020, Until Thu 1/28/2021, Normal       !! - Potential duplicate medications found  Please discuss with provider  No discharge procedures on file      PDMP Review       Value Time User    PDMP Reviewed  Yes 9/19/2021 11:57 AM Susan Beckford DO          ED Provider  Electronically Signed by           Donna Hussein MD  09/22/21 5025

## 2021-09-17 NOTE — QUICK NOTE
Stroke alert response note    Alert called: 12:50 PM  Phone Response : 12:52 PM  NIHSS per er - 3  tPA - yes, high risk for stroke and had some speech issues potentially disabling

## 2021-09-17 NOTE — EMTALA/ACUTE CARE TRANSFER
[facility]  [address] [id number] [phone]  Dept: [phone]      EMTALA TRANSFER CONSENT    NAME Juliana MAHAJAN 1950                              MRN [id number]    I have been informed of my rights regarding examination, treatment, and transfer   by Dr Federico Parra MD    Benefits: Specialized equipment and/or services available at the receiving facility (Include comment)________________________ (CC monitoring post TPA)    Risks: Potential for delay in receiving treatment, Potential deterioration of medical condition, Loss of IV, Increased discomfort during transfer, Possible worsening of condition or death during transfer      Consent for Transfer:  I acknowledge that my medical condition has been evaluated and explained to me by the emergency department physician or other qualified medical person and/or my attending physician, who has recommended that I be transferred to the service of  Accepting Physician: Ronal Kennedy at [address] Name, [address] : [facility]  The above potential benefits of such transfer, the potential risks associated with such transfer, and the probable risks of not being transferred have been explained to me, and I fully understand them  The doctor has explained that, in my case, the benefits of transfer outweigh the risks  I agree to be transferred  I authorize the performance of emergency medical procedures and treatments upon me in both transit and upon arrival at the receiving facility  Additionally, I authorize the release of any and all medical records to the receiving facility and request they be transported with me, if possible  I understand that the safest mode of transportation during a medical emergency is an ambulance and that the Hospital advocates the use of this mode of transport   Risks of traveling to the receiving facility by car, including absence of medical control, life sustaining equipment, such as oxygen, and medical personnel has been explained to me and I fully understand them  (LESLIE CORRECT BOX BELOW)  [  ]  I consent to the stated transfer and to be transported by ambulance/helicopter  [  ]  I consent to the stated transfer, but refuse transportation by ambulance and accept full responsibility for my transportation by car  I understand the risks of non-ambulance transfers and I exonerate the Hospital and its staff from any deterioration in my condition that results from this refusal     X___________________________________________    DATE  21  TIME________  Signature of patient or legally responsible individual signing on patient behalf           RELATIONSHIP TO PATIENT_________________________          Provider Certification    NAME Bienvenido Arellano                                         1950                              MRN [id number]    A medical screening exam was performed on the above named patient  Based on the examination:    Condition Necessitating Transfer The encounter diagnosis was Stroke [facility])      Patient Condition: The patient has been stabilized such that within reasonable medical probability, no material deterioration of the patient condition or the condition of the unborn child(mahnaz) is likely to result from the transfer    Reason for Transfer: Level of Care needed not available at this facility (CC monitoring post TPA)    Transfer Requirements: Facility [facility]   · Space available and qualified personnel available for treatment as acknowledged by Carina  · Agreed to accept transfer and to provide appropriate medical treatment as acknowledged by       Yung  · Appropriate medical records of the examination and treatment of the patient are provided at the time of transfer   [address] _______  · Transfer will be performed by qualified personnel from [facility] or [facility]  and appropriate transfer equipment as required, including the use of necessary and appropriate life support measures  Provider Certification: I have examined the patient and explained the following risks and benefits of being transferred/refusing transfer to the patient/family:  General risk, such as traffic hazards, adverse weather conditions, rough terrain or turbulence, possible failure of equipment (including vehicle or aircraft), or consequences of actions of persons outside the control of the transport personnel, Unanticipated needs of medical equipment and personnel during transport, Risk of worsening condition, The possibility of a transport vehicle being unavailable      Based on these reasonable risks and benefits to the patient and/or the unborn child(mahnaz), and based upon the information available at the time of the patients examination, I certify that the medical benefits reasonably to be expected from the provision of appropriate medical treatments at another medical facility outweigh the increasing risks, if any, to the individuals medical condition, and in the case of labor to the unborn child, from effecting the transfer      X____________________________________________ DATE 09/17/21        TIME_______      ORIGINAL - SEND TO MEDICAL RECORDS   COPY - SEND WITH PATIENT DURING TRANSFER

## 2021-09-17 NOTE — ASSESSMENT & PLAN NOTE
- Presented to Health system with sudden onset R sided facial, arm and leg numbness and weakness    - On admission NIHSS of 3  - Last known well at 10:40 am 9/17   - Daughter also notes word finding difficulties and unsteady gait  Stroke alert was called  - 9/17 CT Stroke alert: no acute intracranial abnormality; 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 and mild microangiopathic change  - 9/17 Negative CTA head and neck for large vessel occlusion, dissection, aneurysm, or high-grade stenosis; mild atherosclerotic disease of the head and neck  Pt is s/p tPA at 1:30 pm  She was transferred to [place] [address] for close monitoring       Plan:   - Q1h neuro checks   - repeat CTH at 1:30 pm 9/18, 24 hours after tPA administered  - dvt ppx: SCDs, no chemical ppx until after repeat CTH   - SBP goal: <160  - prn tylenol for pain  - Lipid panel and HgbA1C pending   - PT/OT

## 2021-09-18 ENCOUNTER — APPOINTMENT (INPATIENT)
Dept: NON INVASIVE DIAGNOSTICS | Facility: HOSPITAL | Age: 71
DRG: 065 | End: 2021-09-18
Payer: COMMERCIAL

## 2021-09-18 ENCOUNTER — APPOINTMENT (INPATIENT)
Dept: RADIOLOGY | Facility: HOSPITAL | Age: 71
DRG: 065 | End: 2021-09-18
Payer: COMMERCIAL

## 2021-09-18 PROBLEM — R29.90 STROKE-LIKE SYMPTOMS: Status: ACTIVE | Noted: 2021-09-17

## 2021-09-18 PROBLEM — R51.9 HEADACHE: Status: ACTIVE | Noted: 2021-09-18

## 2021-09-18 LAB
ALBUMIN SERPL BCP-MCNC: 3.3 G/DL (ref 3.5–5)
ALP SERPL-CCNC: 86 U/L (ref 46–116)
ALT SERPL W P-5'-P-CCNC: 19 U/L (ref 12–78)
ANION GAP SERPL CALCULATED.3IONS-SCNC: 4 MMOL/L (ref 4–13)
AST SERPL W P-5'-P-CCNC: 18 U/L (ref 5–45)
BASOPHILS # BLD AUTO: 0.05 THOUSANDS/ΜL (ref 0–0.1)
BASOPHILS NFR BLD AUTO: 1 % (ref 0–1)
BILIRUB SERPL-MCNC: 1.21 MG/DL (ref 0.2–1)
BUN SERPL-MCNC: 15 MG/DL (ref 5–25)
CA-I BLD-SCNC: 1.14 MMOL/L (ref 1.12–1.32)
CALCIUM ALBUM COR SERPL-MCNC: 9.1 MG/DL (ref 8.3–10.1)
CALCIUM SERPL-MCNC: 8.5 MG/DL (ref 8.3–10.1)
CHLORIDE SERPL-SCNC: 108 MMOL/L (ref 100–108)
CHOLEST SERPL-MCNC: 121 MG/DL (ref 50–200)
CO2 SERPL-SCNC: 27 MMOL/L (ref 21–32)
CREAT SERPL-MCNC: 0.56 MG/DL (ref 0.6–1.3)
EOSINOPHIL # BLD AUTO: 0.23 THOUSAND/ΜL (ref 0–0.61)
EOSINOPHIL NFR BLD AUTO: 4 % (ref 0–6)
ERYTHROCYTE [DISTWIDTH] IN BLOOD BY AUTOMATED COUNT: 13.6 % (ref 11.6–15.1)
EST. AVERAGE GLUCOSE BLD GHB EST-MCNC: 114 MG/DL
GFR SERPL CREATININE-BSD FRML MDRD: 95 ML/MIN/1.73SQ M
GLUCOSE SERPL-MCNC: 95 MG/DL (ref 65–140)
HBA1C MFR BLD: 5.6 %
HCT VFR BLD AUTO: 43.8 % (ref 34.8–46.1)
HDLC SERPL-MCNC: 41 MG/DL
HGB BLD-MCNC: 14.5 G/DL (ref 11.5–15.4)
IMM GRANULOCYTES # BLD AUTO: 0.02 THOUSAND/UL (ref 0–0.2)
IMM GRANULOCYTES NFR BLD AUTO: 0 % (ref 0–2)
INR PPP: 1.16 (ref 0.84–1.19)
LDLC SERPL CALC-MCNC: 66 MG/DL (ref 0–100)
LYMPHOCYTES # BLD AUTO: 1.38 THOUSANDS/ΜL (ref 0.6–4.47)
LYMPHOCYTES NFR BLD AUTO: 23 % (ref 14–44)
MAGNESIUM SERPL-MCNC: 2.5 MG/DL (ref 1.6–2.6)
MCH RBC QN AUTO: 32.2 PG (ref 26.8–34.3)
MCHC RBC AUTO-ENTMCNC: 33.1 G/DL (ref 31.4–37.4)
MCV RBC AUTO: 97 FL (ref 82–98)
MONOCYTES # BLD AUTO: 0.46 THOUSAND/ΜL (ref 0.17–1.22)
MONOCYTES NFR BLD AUTO: 8 % (ref 4–12)
NEUTROPHILS # BLD AUTO: 3.77 THOUSANDS/ΜL (ref 1.85–7.62)
NEUTS SEG NFR BLD AUTO: 64 % (ref 43–75)
NRBC BLD AUTO-RTO: 0 /100 WBCS
PHOSPHATE SERPL-MCNC: 4 MG/DL (ref 2.3–4.1)
PLATELET # BLD AUTO: 227 THOUSANDS/UL (ref 149–390)
PMV BLD AUTO: 9.5 FL (ref 8.9–12.7)
POTASSIUM SERPL-SCNC: 4.1 MMOL/L (ref 3.5–5.3)
PROT SERPL-MCNC: 7.4 G/DL (ref 6.4–8.2)
PROTHROMBIN TIME: 14.4 SECONDS (ref 11.6–14.5)
RBC # BLD AUTO: 4.51 MILLION/UL (ref 3.81–5.12)
SODIUM SERPL-SCNC: 139 MMOL/L (ref 136–145)
TRIGL SERPL-MCNC: 72 MG/DL
WBC # BLD AUTO: 5.91 THOUSAND/UL (ref 4.31–10.16)

## 2021-09-18 PROCEDURE — NC001 PR NO CHARGE: Performed by: ANESTHESIOLOGY

## 2021-09-18 PROCEDURE — 80053 COMPREHEN METABOLIC PANEL: CPT | Performed by: REGISTERED NURSE

## 2021-09-18 PROCEDURE — 85610 PROTHROMBIN TIME: CPT | Performed by: REGISTERED NURSE

## 2021-09-18 PROCEDURE — 70553 MRI BRAIN STEM W/O & W/DYE: CPT

## 2021-09-18 PROCEDURE — 82330 ASSAY OF CALCIUM: CPT | Performed by: REGISTERED NURSE

## 2021-09-18 PROCEDURE — 70450 CT HEAD/BRAIN W/O DYE: CPT

## 2021-09-18 PROCEDURE — G1004 CDSM NDSC: HCPCS

## 2021-09-18 PROCEDURE — 99233 SBSQ HOSP IP/OBS HIGH 50: CPT | Performed by: ANESTHESIOLOGY

## 2021-09-18 PROCEDURE — 85025 COMPLETE CBC W/AUTO DIFF WBC: CPT | Performed by: REGISTERED NURSE

## 2021-09-18 PROCEDURE — 84100 ASSAY OF PHOSPHORUS: CPT | Performed by: REGISTERED NURSE

## 2021-09-18 PROCEDURE — A9585 GADOBUTROL INJECTION: HCPCS | Performed by: REGISTERED NURSE

## 2021-09-18 PROCEDURE — 99223 1ST HOSP IP/OBS HIGH 75: CPT | Performed by: PSYCHIATRY & NEUROLOGY

## 2021-09-18 PROCEDURE — 92610 EVALUATE SWALLOWING FUNCTION: CPT

## 2021-09-18 PROCEDURE — 83036 HEMOGLOBIN GLYCOSYLATED A1C: CPT | Performed by: REGISTERED NURSE

## 2021-09-18 PROCEDURE — 93306 TTE W/DOPPLER COMPLETE: CPT | Performed by: INTERNAL MEDICINE

## 2021-09-18 PROCEDURE — C8929 TTE W OR WO FOL WCON,DOPPLER: HCPCS

## 2021-09-18 PROCEDURE — 83735 ASSAY OF MAGNESIUM: CPT | Performed by: REGISTERED NURSE

## 2021-09-18 PROCEDURE — 80061 LIPID PANEL: CPT | Performed by: REGISTERED NURSE

## 2021-09-18 RX ORDER — PANTOPRAZOLE SODIUM 40 MG/1
40 TABLET, DELAYED RELEASE ORAL
Status: DISCONTINUED | OUTPATIENT
Start: 2021-09-19 | End: 2021-09-19 | Stop reason: HOSPADM

## 2021-09-18 RX ADMIN — PERFLUTREN 0.4 ML/MIN: 6.52 INJECTION, SUSPENSION INTRAVENOUS at 10:09

## 2021-09-18 RX ADMIN — GADOBUTROL 7 ML: 604.72 INJECTION INTRAVENOUS at 18:19

## 2021-09-18 RX ADMIN — ATORVASTATIN CALCIUM 40 MG: 40 TABLET, FILM COATED ORAL at 17:19

## 2021-09-18 NOTE — SPEECH THERAPY NOTE
Speech Language/Pathology  Speech-Language Pathology Bedside Swallow Evaluation      Patient Name: Roberto Basurto    YCOYE'J Date: 9/18/2021     Problem List  Principal Problem:    Stroke-like symptoms  Active Problems:    [facility] )    Pure hypertriglyceridemia    GERD (gastroesophageal reflux disease)    Glaucoma      Past Medical History  Past Medical History:   Diagnosis Date    [facility] ([facility] )     gallbladder    COVID-19     GERD (gastroesophageal reflux disease)     Glaucoma     History of gallbladder cancer     Approx  3 years ago    Hyperlipemia     Murmur     Rheumatic fever        Past Surgical History  Past Surgical History:   Procedure Laterality Date    CHOLECYSTECTOMY      TONSILLECTOMY  CHILDHOOD       Summary   Pt presented with functional appearing oral and pharyngeal stage swallowing skills with materials administered today  Additionally, per pt report/interview and informal screening, speech/language concerns have resolved  Recommended Diet: regular diet and thin liquids   Recommended Form of Meds: as desired                   Current Medical Status  Pt is a 80 y o  female who presented to [address] with sudden onset R facial,arm, and leg numbness  Pt was administered tPa and transferred to [facility]  Allergies:  No known food allergies    Past medical history:  Please see H&P for details    Special Studies:  [address] [place] [address] 9/17 -No acute intracranial abnormality  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 and mild microangiopathic change  MRI ordered    Social/Education/Vocational Hx:  Pt lives with family    Swallow Information   Current Risks for Dysphagia & Aspiration: CVA  Current Symptoms/Concerns: was having R facial numbness    Current Diet: regular diet and thin liquids   Baseline Diet: regular diet and thin liquids      Baseline Assessment   Behavior/Cognition: alert  Speech/Language Status: able to participate in conversation and able to follow commands  Patient Positioning: upright in bed  Pain Status/Interventions/Response to Interventions:  No report of or nonverbal indications of pain  Swallow Mechanism Exam  Facial: symmetrical  Labial: WFL  Lingual: WFL  Velum: symmetrical  Mandible: adequate ROM  Dentition: adequate  Vocal quality:clear/adequate   Volitional Cough: strong/productive       Consistencies Assessed and Performance   Consistencies Administered: thin liquids, puree and hard solids    Oral Stage: WFL  Mastication was adequate with the materials administered today  Bolus formation and transfer were functional with no significant oral residue noted  No overt s/s reduced oral control  Pharyngeal Stage: WFL  Swallow Mechanics:  Swallowing initiation appeared prompt  Laryngeal rise was palpated and judged to be within functional limits  No coughing, throat clearing, change in vocal quality or respiratory status noted today  Esophageal Concerns: none reported    Summary and Recommendations (see above)    Results Reviewed with: patient and RN     Treatment Recommended: no ST intervention indicated at this time

## 2021-09-18 NOTE — ASSESSMENT & PLAN NOTE
- Presented to [facility] with sudden onset R sided facial, arm and leg numbness and weakness    - On admission NIHSS of 3  - Last known well at 10:40 am 9/17   - Daughter also notes word finding difficulties and unsteady gait  Stroke alert was called  - 9/17 CT Stroke alert: no acute intracranial abnormality; 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 and mild microangiopathic change  - 9/17 Negative CTA head and neck for large vessel occlusion, dissection, aneurysm, or high-grade stenosis; mild atherosclerotic disease of the head and neck  Pt is s/p tPA at 1:30 pm  She was transferred to [place] [address] for close monitoring       Plan:   - repeat CTH at 1:30 pm 9/18, 24 hours after tPA administered showed no acute intracranial abnormality and no intracranial hemorrhage status post TPA  - SBP goal: <160  - prn tylenol for pain   - PT/OT  - transfer to med surg with neurology primary

## 2021-09-18 NOTE — OCCUPATIONAL THERAPY NOTE
Occupational Therapy Cancellation Note        Patient Name: Colton Jeter  HHDFC'D Date: 9/18/2021 09/18/21 1005   OT Last Visit   OT Visit Date 09/18/21   Note Type   Note type Evaluation   Cancel Reasons Medical status       OT orders received, chart reviewed  Noted that pt was given tPA and has active bedrest orders  OT will hold at this time and continue to follow and see as medically appropriate and able       Joycelyn Rivera, GERONIMO, OTR/L

## 2021-09-18 NOTE — CONSULTS
NEUROLOGY RESIDENCY CONSULT AND ACCEPTANCE NOTE     Name: Florencio Shane   Age & Sex: 80 y o  female   MRN: [id number]  Unit/Bed#: [address] 717-01   Encounter: [id number]  Length of Stay: 1    Recommendations for outpatient neurological follow up have yet to be determined       Pending for d/c: d/c and o/p f/u planning (patient otherwise medically stable for d/c likely tomorrow)    ASSESSMENT & PLAN     * Stroke-like symptoms  Assessment & Plan  Patient presented w/ acute ischemic stroke   Presenting sx: dysarthria, potentially disabling   NIHSS: 3   tPA: given at 1339 9/17/21   Mechanical thrombectomy - no LVO target   Workup  o CTH/CTA: small old L cerebellar infarct; negative for LVO, dissection, high-grade stenosis, mild intra- and extra-cranial atherosclerosis  o MRI: acute sub-cm left pontine infarct, in vertebrobasilar territory; no abnormal enhancement; mild to moderate microangiopathic changes  o Labs: HbA1c 5 6, LDL 66  o Repeat CTH: no hemorrhagic transformation s/p tPA  o Echo: LVEF 60%, trace MR/TR, no clear RWMA   Exam: minimal sx persist at this time, mild cognitive deficits    Plan:   Admit on stroke s/p tPA pathway to ICU   BP goal < 180/105, Cardene, labetalol prn   ASA, statin   Frequent neuro checks: stat repeat CTH for acute change   Telemetry   Replete lytes to goal K 4, Mg 2, PO4 4   Glycemic control goal gluc < 180 (SSI or drip as needed)   PT/OT/PMR/ST w/ swallow eval    Headache  Assessment & Plan  5/10, chronic for over 1 yr   ESR/CRP    GERD (gastroesophageal reflux disease)  Assessment & Plan  Replaced home med w/ protonix    COPD, group A, by GOLD 2017 classification [facility])  Assessment & Plan  Patient has multiple inhalers on home med list but is reportedly not taking these at this time    Pure hypertriglyceridemia  Assessment & Plan  Lab Results   Component Value Date    CHOLESTEROL 121 09/18/2021    TRIG 72 09/18/2021    HDL 41 09/18/2021    LDLCALC 66 09/18/2021  Continue statin    Primary gall bladder adenocarcinoma (HCC)  Assessment & Plan  No signs of abnormal enhancement on MRI    SUBJECTIVE     Reason for Consult / Principal Problem: stroke  Hx and PE limited by: n/a    HPI: Marlyn Hernandez is a 80 y o  female w/ PMH HLD, COPD, GB carcinoma s/p resection/chemo who p/w acute onset dysarthria and right-sided weakness  LKN 9/17 10:40am  NIHSS 3, given tPA 1330 9/17/21  No LOC, injury, prior strokes  CTH/CTA w/ findings as described above and below  BP well-controlled  Not on AC  Denies N/V/F/C, abdominal pain, dizziness, HA, visual changes, new weakness or sensory changes  Reports significant improvement of sx since administration of tPA  Does have some cognitive deficits for more complex tasks, working memory but this may be subacute  Has had HA for over 1 yr  Will check for inflammatory causes  Inpatient consult to Neurology  Consult performed by: Lizy Holt MD  Consult ordered by: Billie Barnard MD        Historical Information   Past Medical History:   Diagnosis Date    Cancer [facility])     gallbladder    COVID-19     GERD (gastroesophageal reflux disease)     Glaucoma     History of gallbladder cancer     Approx   3 years ago    Hyperlipemia     Murmur     Rheumatic fever      Past Surgical History:   Procedure Laterality Date    CHOLECYSTECTOMY      TONSILLECTOMY  CHILDHOOD     Social History   Social History     Substance and Sexual Activity   Alcohol Use Never     Social History     Substance and Sexual Activity   Drug Use Never     E-Cigarette/Vaping    E-Cigarette Use Never User      E-Cigarette/Vaping Substances    Nicotine No     THC No     CBD No     Flavoring No     Other No     Unknown No      Social History     Tobacco Use   Smoking Status Former Smoker   Smokeless Tobacco Never Used     Family History:   Family History   Problem Relation Age of Onset    Heart attack Mother     Kidney disease Father     Breast cancer Sister 77    No Known Problems Daughter     No Known Problems Maternal Grandmother     No Known Problems Paternal Grandmother     No Known Problems Sister     No Known Problems Maternal Aunt     No Known Problems Maternal Aunt     No Known Problems Maternal Aunt     No Known Problems Paternal Aunt     No Known Problems Paternal Aunt      Meds/Allergies   all current active meds have been reviewed, current meds:   Current Facility-Administered Medications   Medication Dose Route Frequency    acetaminophen (TYLENOL) tablet 650 mg  650 mg Oral Q6H PRN    atorvastatin (LIPITOR) tablet 40 mg  40 mg Oral QPM    [START ON 2021] pantoprazole (PROTONIX) EC tablet 40 mg  40 mg Oral Early Morning    travoprost (TRAVATAN-Z) 0 004 % ophthalmic solution 1 drop  1 drop Both Eyes HS    and PTA meds:   Prior to Admission Medications   Prescriptions Last Dose Informant Patient Reported? Taking?    Spiriva Respimat 2 5 MCG/ACT AERS inhaler   Yes No   acetaminophen (TYLENOL) 325 mg tablet  Self Yes No   Sig: Take by mouth as needed    albuterol (PROVENTIL HFA,VENTOLIN HFA) 90 mcg/act inhaler   Yes No   Sig: Inhale 2 puffs every 6 (six) hours as needed   atorvastatin (LIPITOR) 20 mg tablet  Self No No   Sig: Take 1 tablet (20 mg total) by mouth daily   mometasone (NASONEX) 50 mcg/act nasal spray   No No   Si sprays into each nostril daily   Patient not taking: Reported on 5/3/2021   omeprazole (PriLOSEC) 20 mg delayed release capsule  Self No No   Sig: Take 1 capsule (20 mg total) by mouth daily   tiotropium (Spiriva Respimat) 2 5 MCG/ACT AERS inhaler   Yes No   Sig: Inhale daily   travoprost (TRAVATAN-Z) 0 004 % ophthalmic solution  Self Yes No   Si drop daily at bedtime   valACYclovir (VALTREX) 1,000 mg tablet   No No   Sig: Take 1 tablet (1,000 mg total) by mouth 2 (two) times a day for 1 day   Patient not taking: Reported on 2021      Facility-Administered Medications: None     Allergies   Allergen Reactions    Ciprofloxacin     Clindamycin     Doxycycline     Fluticasone-Salmeterol Other (See Comments)     Leg cramps    Keflex [Cephalexin]     Naproxen Hives and Other (See Comments)     "hives & chest pain"     Nsaids     Penicillins     Sulfa Antibiotics Hives, Itching and Swelling     Previous medical records under review    Review of Systems negative except where noted    OBJECTIVE     Patient ID: Francisco Peralta is a 80 y o  female  Vitals:   Vitals:    21 1500 21 1600 21 1700 21 1800   BP: 133/65 114/56 130/62 128/60   BP Location:       Pulse: 74 70 75 72   Resp:       Temp:       TempSrc:       SpO2:  90% 92%    Weight:       Height:          Body mass index is 29 26 kg/m²  Intake/Output Summary (Last 24 hours) at 2021  Last data filed at 2021 1200  Gross per 24 hour   Intake 780 ml   Output --   Net 780 ml       Temperature:   Temp (24hrs), Av 6 °F (37 °C), Min:98 2 °F (36 8 °C), Max:98 9 °F (37 2 °C)    Temperature: 98 9 °F (37 2 °C)    Invasive Devices: Invasive Devices     Peripheral Intravenous Line            Peripheral IV 21 Left Antecubital 233 days    Peripheral IV 21 Right Antecubital 1 day              Physical Exam   Vitals reviewed  Constitutional:       General: not in acute distress  Appearance: Normal appearance  Not ill-appearing, toxic-appearing or diaphoretic  HENT:      Head: Normocephalic and atraumatic  Right Ear: External ear normal     Left Ear: External ear normal       Nose: Nose normal  No congestion or rhinorrhea  Mouth/Throat:    Mouth: Mucous membranes are moist     Pharynx: Oropharynx is clear  No oropharyngeal exudate or posterior oropharyngeal erythema  Eyes:      General: No scleral icterus  Right eye: No discharge  Left eye: No discharge  Conjunctiva/sclera: Conjunctivae normal    Pulmonary:      Effort: Pulmonary effort is normal  No respiratory distress     Skin:     Coloration: Skin is not pale  Psychiatric:         Mood and Affect: Mood normal          Behavior: Behavior normal      Neurologic Exam   Mental Status   Level of consciousness: alert     Oriented to person, place, and time  Attention: normal  Speech: speech is normal   Able to name object  Able to repeat  Difficulty with calculations but able to spell WORLD backwards    Cranial Nerves   CN II Visual fields full to confrontation  CN III, IV, VI PERRL, brisk reactivity, intact consensual response b/l, EOMI, no CN III palsy, no CN VI palsy  no nystagmus   CN V   Facial sensation intact  CN VII  Facial expression full, symmetric  CN VIII Hearing: intact and roughly symmetric  CN IX, X Palate: symmetric  CN XI trapezius strength normal b/l  CN XII normal      Motor Exam   Muscle bulk: normal    Overall muscle tone: normal  Pronator drift: absent b/l  Strength intact and symmetric BUE/BLE     Sensory Exam   Light touch normal  Vibration normal  Temperature intact and symmetric     Gait, Coordination, and Reflexes   Coordination: FTN and HTS normal b/l  Tremor: Resting tremor: absent Action tremor: absent  Reflexes: Brachioradialis: 2+ b/l    Biceps: 2+ b/l    Patellar: 2+ b/l     Plantar response downgoing b/l    LABORATORY DATA     Labs: I have personally reviewed pertinent reports      Results from last 7 days   Lab Units 09/18/21  0609 09/17/21  1300   WBC Thousand/uL 5 91 7 80   HEMOGLOBIN g/dL 14 5 14 6   HEMATOCRIT % 43 8 42 6   PLATELETS Thousands/uL 227 229   NEUTROS PCT % 64  --    MONOS PCT % 8  --       Results from last 7 days   Lab Units 09/18/21  0610 09/17/21  1300   POTASSIUM mmol/L 4 1 4 2   CHLORIDE mmol/L 108 104   CO2 mmol/L 27 28   BUN mg/dL 15 14   CREATININE mg/dL 0 56* 0 74   CALCIUM mg/dL 8 5 9 4   ALK PHOS U/L 86  --    ALT U/L 19  --    AST U/L 18  --      Results from last 7 days   Lab Units 09/18/21  0610   MAGNESIUM mg/dL 2 5     Results from last 7 days   Lab Units 09/18/21  0610   PHOSPHORUS mg/dL 4 0      Results from last 7 days   Lab Units 09/18/21  0610 09/17/21  1300   INR  1 16 1 03   PTT seconds  --  28         Results from last 7 days   Lab Units 09/17/21  1300   TROPONIN I ng/mL <0 03       IMAGING & DIAGNOSTIC TESTING     Radiology Results: I have personally reviewed pertinent reports  and I have personally reviewed pertinent films in PACS  MRI brain w wo contrast   Final Result by Teddy Hallman MD (09/18 1931)      Acute, subcentimeter left pontine infarct, vertebral basilar vascular territory  No intracranial hemorrhage status post TPA  Other nonemergent findings above  Workstation performed: [id number]         CT head wo contrast   Final Result by Teddy Hallman MD (09/18 1727)      No acute intracranial abnormality  No intracranial hemorrhage status post TPA  Workstation performed: [id number]           Other Diagnostic Testing: I have personally reviewed pertinent reports        ACTIVE MEDICATIONS     Current Facility-Administered Medications   Medication Dose Route Frequency    acetaminophen (TYLENOL) tablet 650 mg  650 mg Oral Q6H PRN    atorvastatin (LIPITOR) tablet 40 mg  40 mg Oral QPM    [START ON 9/19/2021] pantoprazole (PROTONIX) EC tablet 40 mg  40 mg Oral Early Morning    travoprost (TRAVATAN-Z) 0 004 % ophthalmic solution 1 drop  1 drop Both Eyes HS     CODE STATUS & ADVANCED DIRECTIVES     Code Status: Level 1 - Full Code  Advance Directive and Living Will:      Power of :    POLST:      VTE Pharmacologic Prophylaxis: should be able to restart tomorrow  VTE Mechanical Prophylaxis: sequential compression device    ==  Pennelope MD Shankar  Resident, Neurology, PGY-2  [address]

## 2021-09-18 NOTE — PLAN OF CARE
Problem: MOBILITY - ADULT  Goal: Maintain or return to baseline ADL function  Description: INTERVENTIONS:  -  Assess patient's ability to carry out ADLs; assess patient's baseline for ADL function and identify physical deficits which impact ability to perform ADLs (bathing, care of mouth/teeth, toileting, grooming, dressing, etc )  - Assess/evaluate cause of self-care deficits   - Assess range of motion  - Assess patient's mobility; develop plan if impaired  - Assess patient's need for assistive devices and provide as appropriate  - Encourage maximum independence but intervene and supervise when necessary  - Involve family in performance of ADLs  - Assess for home care needs following discharge   - Consider OT consult to assist with ADL evaluation and planning for discharge  - Provide patient education as appropriate  Outcome: Progressing     Problem: Nutrition/Hydration-ADULT  Goal: Nutrient/Hydration intake appropriate for improving, restoring or maintaining nutritional needs  Description: Monitor and assess patient's nutrition/hydration status for malnutrition  Collaborate with interdisciplinary team and initiate plan and interventions as ordered  Monitor patient's weight and dietary intake as ordered or per policy  Utilize nutrition screening tool and intervene as necessary  Determine patient's food preferences and provide high-protein, high-caloric foods as appropriate       INTERVENTIONS:  - Monitor oral intake, urinary output, labs, and treatment plans  - Assess nutrition and hydration status and recommend course of action  - Evaluate amount of meals eaten  - Assist patient with eating if necessary   - Allow adequate time for meals  - Recommend/ encourage appropriate diets, oral nutritional supplements, and vitamin/mineral supplements  - Order, calculate, and assess calorie counts as needed  - Recommend, monitor, and adjust tube feedings and TPN/PPN based on assessed needs  - Assess need for intravenous fluids  - Provide specific nutrition/hydration education as appropriate  - Include patient/family/caregiver in decisions related to nutrition  Outcome: Progressing     Problem: NEUROSENSORY - ADULT  Goal: Achieves stable or improved neurological status  Description: INTERVENTIONS  - Monitor and report changes in neurological status  - Monitor vital signs such as temperature, blood pressure, glucose, and any other labs ordered   - Initiate measures to prevent increased intracranial pressure  - Monitor for seizure activity and implement precautions if appropriate      Outcome: Progressing  Goal: Remains free of injury related to seizures activity  Description: INTERVENTIONS  - Maintain airway, patient safety  and administer oxygen as ordered  - Monitor patient for seizure activity, document and report duration and description of seizure to physician/advanced practitioner  - If seizure occurs,  ensure patient safety during seizure  - Reorient patient post seizure  - Seizure pads on all 4 side rails  - Instruct patient/family to notify RN of any seizure activity including if an aura is experienced  - Instruct patient/family to call for assistance with activity based on nursing assessment  - Administer anti-seizure medications if ordered    Outcome: Progressing  Goal: Achieves maximal functionality and self care  Description: INTERVENTIONS  - Monitor swallowing and airway patency with patient fatigue and changes in neurological status  - Encourage and assist patient to increase activity and self care     - Encourage visually impaired, hearing impaired and aphasic patients to use assistive/communication devices  Outcome: Progressing     Problem: PAIN - ADULT  Goal: Verbalizes/displays adequate comfort level or baseline comfort level  Description: Interventions:  - Encourage patient to monitor pain and request assistance  - Assess pain using appropriate pain scale  - Administer analgesics based on type and severity of pain and evaluate response  - Implement non-pharmacological measures as appropriate and evaluate response  - Consider cultural and social influences on pain and pain management  - Notify physician/advanced practitioner if interventions unsuccessful or patient reports new pain  Outcome: Progressing     Problem: INFECTION - ADULT  Goal: Absence or prevention of progression during hospitalization  Description: INTERVENTIONS:  - Assess and monitor for signs and symptoms of infection  - Monitor lab/diagnostic results  - Monitor all insertion sites, i e  indwelling lines, tubes, and drains  - Monitor endotracheal if appropriate and nasal secretions for changes in amount and color  - [place] appropriate cooling/warming therapies per order  - Administer medications as ordered  - Instruct and encourage patient and family to use good hand hygiene technique  - Identify and instruct in appropriate isolation precautions for identified infection/condition  Outcome: Progressing     Problem: SAFETY ADULT  Goal: Maintain or return to baseline ADL function  Description: INTERVENTIONS:  -  Assess patient's ability to carry out ADLs; assess patient's baseline for ADL function and identify physical deficits which impact ability to perform ADLs (bathing, care of mouth/teeth, toileting, grooming, dressing, etc )  - Assess/evaluate cause of self-care deficits   - Assess range of motion  - Assess patient's mobility; develop plan if impaired  - Assess patient's need for assistive devices and provide as appropriate  - Encourage maximum independence but intervene and supervise when necessary  - Involve family in performance of ADLs  - Assess for home care needs following discharge   - Consider OT consult to assist with ADL evaluation and planning for discharge  - Provide patient education as appropriate  Outcome: Progressing     Problem: DISCHARGE PLANNING  Goal: Discharge to home or other facility with appropriate resources  Description: INTERVENTIONS:  - Identify barriers to discharge w/patient and caregiver  - Arrange for needed discharge resources and transportation as appropriate  - Identify discharge learning needs (meds, wound care, etc )  - Arrange for interpretive services to assist at discharge as needed  - Refer to Case Management Department for coordinating discharge planning if the patient needs post-hospital services based on physician/advanced practitioner order or complex needs related to functional status, cognitive ability, or social support system  Outcome: Progressing     Problem: Knowledge Deficit  Goal: Patient/family/caregiver demonstrates understanding of disease process, treatment plan, medications, and discharge instructions  Description: Complete learning assessment and assess knowledge base    Interventions:  - Provide teaching at level of understanding  - Provide teaching via preferred learning methods  Outcome: Progressing

## 2021-09-18 NOTE — H&P
[facility]  H&P- Ariel Whittington 1950, 80 y o  female MRN: [id number]  Unit/Bed#: [facility] 452-51 Encounter: [id number]  Primary Care Provider: PAYAL Ayoub   Date and time admitted to hospital: 9/17/2021  8:40 PM    * Stroke [facility])  Assessment & Plan  - Presented to [facility] with sudden onset R sided facial, arm and leg numbness and weakness    - On admission NIHSS of 3  - Last known well at 10:40 am 9/17   - Daughter also notes word finding difficulties and unsteady gait  Stroke alert was called  - 9/17 CT Stroke alert: no acute intracranial abnormality; 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 and mild microangiopathic change  - 9/17 Negative CTA head and neck for large vessel occlusion, dissection, aneurysm, or high-grade stenosis; mild atherosclerotic disease of the head and neck  Pt is s/p tPA at 1:30 pm  She was transferred to [place] [id number] for close monitoring       Plan:   - Q1h neuro checks   - repeat CTH at 1:30 pm 9/18, 24 hours after tPA administered  - dvt ppx: SCDs, no chemical ppx until after repeat CTH   - SBP goal: <160  - prn tylenol for pain  - Lipid panel and HgbA1C pending   - PT/OT        Pure hypertriglyceridemia  Assessment & Plan  Continue home atorvastatin 20 mg daily     GERD (gastroesophageal reflux disease)  Assessment & Plan  Continue home omeprazole     Glaucoma  Assessment & Plan  Continue Home Medications     Primary gall bladder adenocarcinoma ([facility] [address] )  Assessment & Plan  Hx of Gall Bladder Adenocarcinoma   - s/p cholecystectomy   - s/p chemotherapy and radiation therapy   - following outpatient with oncology     -------------------------------------------------------------------------------------------------------------  Chief Complaint: Stroke alert s/p tPA administration    History of Present Illness     Ariel Whittington is a 80 y o  female with PMHx of HLD, COPD, Gallbladder Cancer (s/p cholecystectomy, chemotherapy and radiation therapy), GERD, and Glaucoma who presented to the ED with sudden onset of right facial, right arm and right leg numbness, as well as word finding difficulties  She notes that she was walking into her home when she felt light headed, unsteady, and developed numbness and tingling in her right hand  The numbness and tingling then worsened and traveled into her entire right arm, leg and up into the right side of her face  Patient denies LOC or any trauma  Denies any new or worsening headache, nausea or vomiting at symptom onset  On admission NIHSS of 3  Last known normal at 10:40 am reported by her daughter  Patient reports that she was given one baby aspirin by her son at the onset of her symptoms, but is not on any regular anticoagulation  Patient has no history of previous stroke  CT head displayed no acute intracranial abnormalities and small old infarct in the left cerebellar lobe  CTA head was negative  Patient was then given tPA at 1:38 pm in the ED  Since receiving tPA, the numbness and tingling in her arm, leg and face has resolved, but she continues complaining of right handed numbness and tingling  No focal neurologic deficits appreciated on exam  Patient denies any new or worsening headache  History obtained from patient and chart review  -------------------------------------------------------------------------------------------------------------  Dispo: Admit to Critical Care     Code Status: Level 1 - Full Code  --------------------------------------------------------------------------------------------------------------  Review of Systems    A 12-point, complete review of systems was reviewed and negative except as stated above     Physical Exam  Constitutional:       General: She is not in acute distress  Appearance: Normal appearance  She is not ill-appearing  HENT:      Head: Normocephalic and atraumatic        Mouth/Throat:      Mouth: Mucous membranes are moist    Eyes: Extraocular Movements: Extraocular movements intact  Pupils: Pupils are equal, round, and reactive to light  Cardiovascular:      Rate and Rhythm: Normal rate and regular rhythm  Heart sounds: Normal heart sounds  No murmur heard  No friction rub  Pulmonary:      Effort: Pulmonary effort is normal       Breath sounds: Normal breath sounds  Abdominal:      General: There is no distension  Palpations: Abdomen is soft  Tenderness: There is no abdominal tenderness  Musculoskeletal:         General: No swelling or signs of injury  Skin:     General: Skin is warm and dry  Neurological:      General: No focal deficit present  Mental Status: She is alert and oriented to person, place, and time  Mental status is at baseline  Psychiatric:         Mood and Affect: Mood normal          Behavior: Behavior normal        --------------------------------------------------------------------------------------------------------------  Vitals: There were no vitals filed for this visit  Temp  Min: 97 6 °F (36 4 °C)  Max: 97 6 °F (36 4 °C)        There is no height or weight on file to calculate BMI  Laboratory and Diagnostics:  Results from last 7 days   Lab Units 09/17/21  1300   WBC Thousand/uL 7 80   HEMOGLOBIN g/dL 14 6   HEMATOCRIT % 42 6   PLATELETS Thousands/uL 229     Results from last 7 days   Lab Units 09/17/21  1300   SODIUM mmol/L 141   POTASSIUM mmol/L 4 2   CHLORIDE mmol/L 104   CO2 mmol/L 28   ANION GAP mmol/L 9   BUN mg/dL 14   CREATININE mg/dL 0 74   CALCIUM mg/dL 9 4   GLUCOSE RANDOM mg/dL 96          Results from last 7 days   Lab Units 09/17/21  1300   INR  1 03   PTT seconds 28      Results from last 7 days   Lab Units 09/17/21  1300   TROPONIN I ng/mL <0 03         ABG:    VBG:          Micro:        EKG: Normal Sinus Rhythm   Imaging: I have personally reviewed pertinent reports        Historical Information   Past Medical History:   Diagnosis Date    Cancer ([facility] ) gallbladder    COVID-19     GERD (gastroesophageal reflux disease)     Glaucoma     History of gallbladder cancer     Approx  3 years ago    Hyperlipemia     Murmur     Rheumatic fever      Past Surgical History:   Procedure Laterality Date    CHOLECYSTECTOMY      TONSILLECTOMY  CHILDHOOD     Social History   Social History     Substance and Sexual Activity   Alcohol Use Never     Social History     Substance and Sexual Activity   Drug Use Never     Social History     Tobacco Use   Smoking Status Former Smoker   Smokeless Tobacco Never Used     Family History:   Family History   Problem Relation Age of Onset    Heart attack Mother     Kidney disease Father     Breast cancer Sister 77    No Known Problems Daughter     No Known Problems Maternal Grandmother     No Known Problems Paternal Grandmother     No Known Problems Sister     No Known Problems Maternal Aunt     No Known Problems Maternal Aunt     No Known Problems Maternal Aunt     No Known Problems Paternal Aunt     No Known Problems Paternal Aunt      I have reviewed this patient's family history and commented on sigificant items within the HPI      Medications:  Current Facility-Administered Medications   Medication Dose Route Frequency    acetaminophen (TYLENOL) tablet 650 mg  650 mg Oral Q6H PRN    [START ON 9/18/2021] atorvastatin (LIPITOR) tablet 40 mg  40 mg Oral QPM    travoprost (TRAVATAN-Z) 0 004 % ophthalmic solution 1 drop  1 drop Both Eyes HS     Home medications:  Prior to Admission Medications   Prescriptions Last Dose Informant Patient Reported? Taking?    Spiriva Respimat 2 5 MCG/ACT AERS inhaler   Yes No   acetaminophen (TYLENOL) 325 mg tablet  Self Yes No   Sig: Take by mouth as needed    albuterol (PROVENTIL HFA,VENTOLIN HFA) 90 mcg/act inhaler   Yes No   Sig: Inhale 2 puffs every 6 (six) hours as needed   atorvastatin (LIPITOR) 20 mg tablet  Self No No   Sig: Take 1 tablet (20 mg total) by mouth daily   mometasone (NASONEX) 50 mcg/act nasal spray   No No   Si sprays into each nostril daily   Patient not taking: Reported on 5/3/2021   omeprazole (PriLOSEC) 20 mg delayed release capsule  Self No No   Sig: Take 1 capsule (20 mg total) by mouth daily   tiotropium (Spiriva Respimat) 2 5 MCG/ACT AERS inhaler   Yes No   Sig: Inhale daily   travoprost (TRAVATAN-Z) 0 004 % ophthalmic solution  Self Yes No   Si drop daily at bedtime   valACYclovir (VALTREX) 1,000 mg tablet   No No   Sig: Take 1 tablet (1,000 mg total) by mouth 2 (two) times a day for 1 day   Patient not taking: Reported on 2021      Facility-Administered Medications: None     Allergies: Allergies   Allergen Reactions    Ciprofloxacin     Clindamycin     Doxycycline     Fluticasone-Salmeterol Other (See Comments)     Leg cramps    Keflex [Cephalexin]     Naproxen Hives and Other (See Comments)     "hives & chest pain"     Nsaids     Penicillins     Sulfa Antibiotics Hives, Itching and Swelling       ------------------------------------------------------------------------------------------------------------  Advance Directive and Living Will:      Power of :    POLST:    ------------------------------------------------------------------------------------------------------------  Anticipated Length of Stay is > 2 midnights        Sanam De León        Portions of the record may have been created with voice recognition software  Occasional wrong word or "sound a like" substitutions may have occurred due to the inherent limitations of voice recognition software    Read the chart carefully and recognize, using context, where substitutions have occurred

## 2021-09-18 NOTE — PROGRESS NOTES
Daily Progress Note - Critical Care   Florencio Shane 80 y o  female MRN: [id number]  Unit/Bed#: [facility] 159-46 Encounter: [id number]        ----------------------------------------------------------------------------------------  HPI: Florencio Shane is a 80 y o  female with PMHx of HLD, COPD, Gallbladder Cancer (s/p cholecystectomy, chemotherapy and radiation therapy), GERD, and Glaucoma who presented to the ED with sudden onset of right facial, right arm and right leg numbness, as well as word finding difficulties  She notes that she was walking into her home when she felt light headed, unsteady, and developed numbness and tingling in her right hand  The numbness and tingling then worsened and traveled into her entire right arm, leg and up into the right side of her face  Patient denies LOC or any trauma  Denies any new or worsening headache, nausea or vomiting at symptom onset  On admission NIHSS of 3  Last known normal at 10:40 am reported by her daughter  Patient reports that she was given one baby aspirin by her son at the onset of her symptoms, but is not on any regular anticoagulation  Patient has no history of previous stroke  CT head displayed no acute intracranial abnormalities and small old infarct in the left cerebellar lobe  CTA head was negative  Patient was then given tPA at 1:38 pm in the ED       Since receiving tPA, the numbness and tingling in her arm, leg and face has resolved, but she continues complaining of right handed numbness and tingling  24hr events: No acute events over past 24 hours  Blood pressures have been well controlled with SBP goal <160 without need for medication   ---------------------------------------------------------------------------------------  SUBJECTIVE  Patient continues noting right hand numbness and tingling  Denies any other complaints  Denies any new or worsening headache, nausea, vomiting or light headedness         Review of systems was reviewed and negative unless stated above in HPI/24-hour events   ---------------------------------------------------------------------------------------  Assessment and Plan:    Neuro:    Diagnosis: Stroke ([address] )  o Presented to [facility] with sudden onset R sided facial, arm and leg numbness and weakness  o On admission NIHSS of 3  o Last known well at 10:40 am 9/17   o Daughter also notes word finding difficulties and unsteady gait  Stroke alert was called  o 9/17 CT Stroke alert: no acute intracranial abnormality; 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 and mild microangiopathic change  o 9/17 Negative CTA head and neck for large vessel occlusion, dissection, aneurysm, or high-grade stenosis; mild atherosclerotic disease of the head and neck   Pt is s/p tPA at 1:30 pm  She was transferred to [address] [place] [address] neuro [facility] for close monitoring    o Q1h neuro checks   o Repeat 24 hour CTH today s/p tPA   o 24 hour MRI today s/p tPA  o Echocardiogram today  o Reassess NIHSS q24h  o dvt ppx: SCDs, no chemical ppx until after 24 hour repeat CTH   o SBP goal: <160  o prn tylenol for pain  o Lipid panel and HgbA1C pending   o PT/OT   Diagnosis: Glaucoma  o Continue Home travoprost ophthalmic drops      CV:   Diagnosis: Hyperlipidemia   o Plan: Continue Home Atorvastatin 20 mg daily    Diagnosis: DVT PPX  o Plan: Hold DVT PPX until 24 hour CTH stable       Pulm:   Diagnosis: No acute issues   o On room air   o Maintain SaO2 > 95%  o Good pulmonary hygiene       GI:    Diagnosis: GERD  o Plan: Continue home omeprazole    Bowel Regiment as needed       :    Diagnosis: No acute issues       F/E/N:    Fluids: No maintenance fluids; all fluids P O   Electrolytes: Replete PRN   Nutrition: Normal Diet      Heme/Onc:    Admission Hgb: 14 6   Admission WBC: 7 80   Admission Platelets: 864   Diagnosis: Hx of Gallbladder Adenocarcinoma   o S/p cholecystectomy   o S/p chemotherapy and radiation therapy   o Following outpatient with oncology Endo:    Diagnosis: No Acute Issues       ID:    Diagnosis: No acute issues   o No signs of infection   o WBC: 7 80  o Monitor for Fever and WBC count       MSK/Skin:    Diagnosis: No acute issues  o Frequent patient repositioning   o PT/OT     Patient appropriate for transfer out of the ICU today?: No  Disposition: Continue Critical Care   Code Status: Level 1 - Full Code  ---------------------------------------------------------------------------------------  ICU CORE MEASURES    Prophylaxis   VTE Pharmacologic Prophylaxis: Pharmacologic VTE Prophylaxis contraindicated due to tPA administration within 24 hours   VTE Mechanical Prophylaxis: sequential compression device  Stress Ulcer Prophylaxis: Omeprazole PO    ABCDE Protocol (if indicated)  Plan to perform spontaneous awakening trial today? Not applicable  Plan to perform spontaneous breathing trial today? Not applicable  Obvious barriers to extubation? Not applicable    Invasive Devices Review  Invasive Devices     Peripheral Intravenous Line            Peripheral IV 21 Left Antecubital 232 days    Peripheral IV 21 Right Antecubital 1 day              Can any invasive devices be discontinued today?  Not applicable  ---------------------------------------------------------------------------------------  OBJECTIVE    Vitals   Vitals:    21 2300 21 2330 21 0000 21 0030   BP: 119/58 128/62 110/56 111/53   BP Location: Left arm Left arm Left arm Left arm   Pulse: 67 62 65 71   Resp: 18      Temp: 98 2 °F (36 8 °C)      TempSrc: Oral      SpO2: 93%        Temp (24hrs), Av 9 °F (36 6 °C), Min:97 6 °F (36 4 °C), Max:98 2 °F (36 8 °C)  Current: Temperature: 98 2 °F (36 8 °C)      Respiratory:  SpO2: SpO2: 93 %       Invasive/non-invasive ventilation settings   Respiratory    Lab Data (Last 4 hours)    None         O2/Vent Data (Last 4 hours)    None                Physical Exam  Constitutional:       General: She is not in acute distress  Appearance: Normal appearance  She is not ill-appearing  HENT:      Head: Normocephalic and atraumatic  Mouth/Throat:      Mouth: Mucous membranes are moist    Eyes:      Extraocular Movements: Extraocular movements intact  Pupils: Pupils are equal, round, and reactive to light  Cardiovascular:      Rate and Rhythm: Normal rate and regular rhythm  Heart sounds: Normal heart sounds  No murmur heard  No friction rub  Pulmonary:      Effort: Pulmonary effort is normal       Breath sounds: Normal breath sounds  Abdominal:      General: There is no distension  Palpations: Abdomen is soft  Tenderness: There is no abdominal tenderness  Musculoskeletal:         General: No swelling or signs of injury  Skin:     General: Skin is warm and dry  Neurological:      General: No focal deficit present  Mental Status: She is alert and oriented to person, place, and time  Mental status is at baseline  Psychiatric:         Mood and Affect: Mood normal          Behavior: Behavior normal          Laboratory and Diagnostics:  Results from last 7 days   Lab Units 09/17/21  1300   WBC Thousand/uL 7 80   HEMOGLOBIN g/dL 14 6   HEMATOCRIT % 42 6   PLATELETS Thousands/uL 229     Results from last 7 days   Lab Units 09/17/21  1300   SODIUM mmol/L 141   POTASSIUM mmol/L 4 2   CHLORIDE mmol/L 104   CO2 mmol/L 28   ANION GAP mmol/L 9   BUN mg/dL 14   CREATININE mg/dL 0 74   CALCIUM mg/dL 9 4   GLUCOSE RANDOM mg/dL 96          Results from last 7 days   Lab Units 09/17/21  1300   INR  1 03   PTT seconds 28      Results from last 7 days   Lab Units 09/17/21  1300   TROPONIN I ng/mL <0 03         ABG:    VBG:          Micro        Imaging: I have personally reviewed pertinent reports         MRI Inpatient Order    (Results Pending)   CT head wo contrast    (Results Pending)       Intake and Output  I/O     None          Height and Weights         There is no height or weight on file to calculate BMI  Weight (last 2 days)     None            Nutrition       Diet Orders   (From admission, onward)             Start     Ordered    09/17/21 2159  Diet Regular; Regular House  Diet effective now     Question Answer Comment   Diet Type Regular    Regular Regular House    RD to adjust diet per protocol? Yes        09/17/21 2203                  Active Medications  Scheduled Meds:  Current Facility-Administered Medications   Medication Dose Route Frequency Provider Last Rate    acetaminophen  650 mg Oral Q6H PRN PAYAL Marmolejo      atorvastatin  40 mg Oral QPM PAYAL Guzman      travoprost  1 drop Both Eyes HS PAYAL Guzman       Continuous Infusions:     PRN Meds:   acetaminophen, 650 mg, Q6H PRN        Allergies   Allergies   Allergen Reactions    Ciprofloxacin     Clindamycin     Doxycycline     Fluticasone-Salmeterol Other (See Comments)     Leg cramps    Keflex [Cephalexin]     Naproxen Hives and Other (See Comments)     "hives & chest pain"     Nsaids     Penicillins     Sulfa Antibiotics Hives, Itching and Swelling     ---------------------------------------------------------------------------------------  Advance Directive and Living Will:      Power of :    POLST:    ---------------------------------------------------------------------------------------    Alondra Benjamin      Portions of the record may have been created with voice recognition software  Occasional wrong word or "sound a like" substitutions may have occurred due to the inherent limitations of voice recognition software    Read the chart carefully and recognize, using context, where substitutions have occurred

## 2021-09-18 NOTE — ASSESSMENT & PLAN NOTE
Hx of Gall Bladder Adenocarcinoma   - s/p cholecystectomy   - s/p chemotherapy and radiation therapy   - following outpatient with oncology

## 2021-09-18 NOTE — PLAN OF CARE
Problem: MOBILITY - ADULT  Goal: Maintain or return to baseline ADL function  Description: INTERVENTIONS:  -  Assess patient's ability to carry out ADLs; assess patient's baseline for ADL function and identify physical deficits which impact ability to perform ADLs (bathing, care of mouth/teeth, toileting, grooming, dressing, etc )  - Assess/evaluate cause of self-care deficits   - Assess range of motion  - Assess patient's mobility; develop plan if impaired  - Assess patient's need for assistive devices and provide as appropriate  - Encourage maximum independence but intervene and supervise when necessary  - Involve family in performance of ADLs  - Assess for home care needs following discharge   - Consider OT consult to assist with ADL evaluation and planning for discharge  - Provide patient education as appropriate  Outcome: Progressing  Goal: Maintains/Returns to pre admission functional level  Description: INTERVENTIONS:  - Perform BMAT or MOVE assessment daily    - Set and communicate daily mobility goal to care team and patient/family/caregiver     - Collaborate with rehabilitation services on mobility goals if consulted  Problem: Potential for Falls  Goal: Patient will remain free of falls  Description: INTERVENTIONS:  - Educate patient/family on patient safety including physical limitations  - Instruct patient to call for assistance with activity   - Consult OT/PT to assist with strengthening/mobility   - Keep Call bell within reach  - Keep bed low and locked with side rails adjusted as appropriate  - Keep care items and personal belongings within reach  - Initiate and maintain comfort rounds  - Make Fall Risk Sign visible to staff  - Apply yellow socks and bracelet for high fall risk patients  - Consider moving patient to room near nurses station  Outcome: Progressing     Problem: Nutrition/Hydration-ADULT  Goal: Nutrient/Hydration intake appropriate for improving, restoring or maintaining nutritional needs  Description: Monitor and assess patient's nutrition/hydration status for malnutrition  Collaborate with interdisciplinary team and initiate plan and interventions as ordered  Monitor patient's weight and dietary intake as ordered or per policy  Utilize nutrition screening tool and intervene as necessary  Determine patient's food preferences and provide high-protein, high-caloric foods as appropriate       INTERVENTIONS:  - Monitor oral intake, urinary output, labs, and treatment plans  - Assess nutrition and hydration status and recommend course of action  - Evaluate amount of meals eaten  - Assist patient with eating if necessary   - Allow adequate time for meals  - Recommend/ encourage appropriate diets, oral nutritional supplements, and vitamin/mineral supplements  - Order, calculate, and assess calorie counts as needed  - Recommend, monitor, and adjust tube feedings and TPN/PPN based on assessed needs  - Assess need for intravenous fluids  - Provide specific nutrition/hydration education as appropriate  - Include patient/family/caregiver in decisions related to nutrition  Outcome: Progressing     Problem: NEUROSENSORY - ADULT  Goal: Achieves stable or improved neurological status  Description: INTERVENTIONS  - Monitor and report changes in neurological status  - Monitor vital signs such as temperature, blood pressure, glucose, and any other labs ordered   - Initiate measures to prevent increased intracranial pressure  - Monitor for seizure activity and implement precautions if appropriate      Outcome: Progressing  Goal: Remains free of injury related to seizures activity  Description: INTERVENTIONS  - Maintain airway, patient safety  and administer oxygen as ordered  - Monitor patient for seizure activity, document and report duration and description of seizure to physician/advanced practitioner  - If seizure occurs,  ensure patient safety during seizure  - Reorient patient post seizure  - Seizure pads on all 4 side rails  - Instruct patient/family to notify RN of any seizure activity including if an aura is experienced  - Instruct patient/family to call for assistance with activity based on nursing assessment  - Administer anti-seizure medications if ordered    Outcome: Progressing  Goal: Achieves maximal functionality and self care  Description: INTERVENTIONS  - Monitor swallowing and airway patency with patient fatigue and changes in neurological status  - Encourage and assist patient to increase activity and self care     - Encourage visually impaired, hearing impaired and aphasic patients to use assistive/communication devices  Outcome: Progressing     Problem: PAIN - ADULT  Goal: Verbalizes/displays adequate comfort level or baseline comfort level  Description: Interventions:  - Encourage patient to monitor pain and request assistance  - Assess pain using appropriate pain scale  - Administer analgesics based on type and severity of pain and evaluate response  - Implement non-pharmacological measures as appropriate and evaluate response  - Consider cultural and social influences on pain and pain management  - Notify physician/advanced practitioner if interventions unsuccessful or patient reports new pain  Outcome: Progressing     Problem: INFECTION - ADULT  Goal: Absence or prevention of progression during hospitalization  Description: INTERVENTIONS:  - Assess and monitor for signs and symptoms of infection  - Monitor lab/diagnostic results  - Monitor all insertion sites, i e  indwelling lines, tubes, and drains  - Monitor endotracheal if appropriate and nasal secretions for changes in amount and color  - [place] appropriate cooling/warming therapies per order  - Administer medications as ordered  - Instruct and encourage patient and family to use good hand hygiene technique  - Identify and instruct in appropriate isolation precautions for identified infection/condition  Outcome: Progressing  Goal: Absence of fever/infection during neutropenic period  Description: INTERVENTIONS:  - Monitor WBC    Outcome: Progressing     Problem: SAFETY ADULT  Goal: Maintain or return to baseline ADL function  Description: INTERVENTIONS:  -  Assess patient's ability to carry out ADLs; assess patient's baseline for ADL function and identify physical deficits which impact ability to perform ADLs (bathing, care of mouth/teeth, toileting, grooming, dressing, etc )  - Assess/evaluate cause of self-care deficits   - Assess range of motion  - Assess patient's mobility; develop plan if impaired  - Assess patient's need for assistive devices and provide as appropriate  - Encourage maximum independence but intervene and supervise when necessary  - Involve family in performance of ADLs  - Assess for home care needs following discharge   - Consider OT consult to assist with ADL evaluation and planning for discharge  - Provide patient education as appropriate  Outcome: Progressing  Goal: Maintains/Returns to pre admission functional level  Description: INTERVENTIONS:  - Perform BMAT or MOVE assessment daily    - Set and communicate daily mobility goal to care team and patient/family/caregiver     - Collaborate with rehabilitation services on mobility goals if consulted  - Out of bed for toileting  - Record patient progress and toleration of activity level   Outcome: Progressing  Goal: Patient will remain free of falls  Description: INTERVENTIONS:  - Educate patient/family on patient safety including physical limitations  - Instruct patient to call for assistance with activity   - Consult OT/PT to assist with strengthening/mobility   - Keep Call bell within reach  - Keep bed low and locked with side rails adjusted as appropriate  - Keep care items and personal belongings within reach  - Initiate and maintain comfort rounds  - Make Fall Risk Sign visible to staff  - Apply yellow socks and bracelet for high fall risk patients  - Consider moving patient to room near nurses station  Outcome: Progressing     Problem: DISCHARGE PLANNING  Goal: Discharge to home or other facility with appropriate resources  Description: INTERVENTIONS:  - Identify barriers to discharge w/patient and caregiver  - Arrange for needed discharge resources and transportation as appropriate  - Identify discharge learning needs (meds, wound care, etc )  - Arrange for interpretive services to assist at discharge as needed  - Refer to Case Management Department for coordinating discharge planning if the patient needs post-hospital services based on physician/advanced practitioner order or complex needs related to functional status, cognitive ability, or social support system  Outcome: Progressing     Problem: Knowledge Deficit  Goal: Patient/family/caregiver demonstrates understanding of disease process, treatment plan, medications, and discharge instructions  Description: Complete learning assessment and assess knowledge base    Interventions:  - Provide teaching at level of understanding  - Provide teaching via preferred learning methods  Outcome: Progressing     - Out of bed for toileting  - Record patient progress and toleration of activity level   Outcome: Progressing

## 2021-09-18 NOTE — ASSESSMENT & PLAN NOTE
Patient presented w/ acute ischemic stroke   Presenting sx: dysarthria, potentially disabling   NIHSS: 3   tPA: given at 1339 9/17/21   Mechanical thrombectomy - no LVO target   Workup  o CTH/CTA: small old L cerebellar infarct; negative for LVO, dissection, high-grade stenosis, mild intra- and extra-cranial atherosclerosis  o MRI: acute sub-cm left pontine infarct, in vertebrobasilar territory; no abnormal enhancement; mild to moderate microangiopathic changes  o Labs: HbA1c 5 6, LDL 66  o Repeat CTH: no hemorrhagic transformation s/p tPA  o Echo: LVEF 60%, trace MR/TR, no clear RWMA   Exam: minimal sx persist at this time, mild cognitive deficits    Plan:   Patient stable for d/c at this time   Continue ASA 81 mg daily and Atorvastatin 40 mg QHS   Will need outpatient [facility] to complete workup   Follow up with neurovascular team in 4-6 weeks   Stroke education provided   Advised to monitor BP and follow closely with PCP  

## 2021-09-18 NOTE — PROGRESS NOTES
ICU TRANSFER NOTE     Name: Arelis Jimenez   Age & Sex: 80 y o  female   MRN: [id number]  [facility]   Encounter: [id number]  Hospital Stay Days: 1    Accepting team: Neurology   Code Status: Level 1 - Full Code  Disposition: Patient requires Med/Surg    ASSESSMENT/PLAN     Principal Problem:    Stroke-like symptoms  Active Problems:    Primary gall bladder adenocarcinoma (HCC)    Pure hypertriglyceridemia    GERD (gastroesophageal reflux disease)    Glaucoma      Glaucoma  Assessment & Plan  Continue Home Medications     GERD (gastroesophageal reflux disease)  Assessment & Plan  Continue home omeprazole     Pure hypertriglyceridemia  Assessment & Plan  Continue home atorvastatin 20 mg daily     Primary gall bladder adenocarcinoma ([facility] [address] )  Assessment & Plan  Hx of Gall Bladder Adenocarcinoma   - s/p cholecystectomy   - s/p chemotherapy and radiation therapy   - following outpatient with oncology     * Stroke-like symptoms  Assessment & Plan  - Presented to [facility] with sudden onset R sided facial, arm and leg numbness and weakness    - On admission NIHSS of 3  - Last known well at 10:40 am 9/17   - Daughter also notes word finding difficulties and unsteady gait  Stroke alert was called  - 9/17 CT Stroke alert: no acute intracranial abnormality; 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 and mild microangiopathic change  - 9/17 Negative CTA head and neck for large vessel occlusion, dissection, aneurysm, or high-grade stenosis; mild atherosclerotic disease of the head and neck  Pt is s/p tPA at 1:30 pm  She was transferred to [place] [id number] for close monitoring       Plan:   - repeat CTH at 1:30 pm 9/18, 24 hours after tPA administered showed no acute intracranial abnormality and no intracranial hemorrhage status post TPA  - SBP goal: <160  - prn tylenol for pain   - PT/OT  - transfer to med surg with neurology primary           VTE Pharmacologic Prophylaxis: Reason for no pharmacologic prophylaxis tPA  VTE Mechanical Prophylaxis: sequential compression device    HOSPITAL COURSE     Ada Francie Irving is a 80 y o  female with PMHx of HLD, COPD, Gallbladder Cancer (s/p cholecystectomy, chemotherapy and radiation therapy), GERD, and Glaucoma who presented to the ED with sudden onset of right facial, right arm and right leg numbness, as well as word finding difficulties  She notes that she was walking into her home when she felt light headed, unsteady, and developed numbness and tingling in her right hand  The numbness and tingling then worsened and traveled into her entire right arm, leg and up into the right side of her face  Patient denies LOC or any trauma  Denies any new or worsening headache, nausea or vomiting at symptom onset  On admission NIHSS of 3  Last known normal at 10:40 am reported by her daughter  Patient reports that she was given one baby aspirin by her son at the onset of her symptoms, but is not on any regular anticoagulation  Patient has no history of previous stroke  CT head displayed no acute intracranial abnormalities and small old infarct in the left cerebellar lobe  CTA head was negative  Patient was then given tPA at 1:38 pm in the ED       Since receiving tPA, the numbness and tingling in her arm, leg and face has resolved, but she continues complaining of right handed numbness and tingling       No acute events over past 24 hours  Blood pressures have been well controlled with SBP goal <160 without need for medication  CTH 24 hours post tPA shows no acute intracranial abnormality and no intracranial hemorrhage status post TPA  Patient will be [facility] to [facility] with neurology as the primary team      SUBJECTIVE     Patient continues noting right hand numbness and tingling  Denies any other complaints  Denies any new or worsening headache, nausea, vomiting or light headedness       Review of systems was reviewed and negative unless stated above in HPI/24-hour events OBJECTIVE     Vitals:    09/18/21 0900 09/18/21 1000 09/18/21 1100 09/18/21 1200   BP: 115/63 122/63 124/61 121/59   BP Location:       Pulse: 64 68 60 72   Resp: 18      Temp:       TempSrc:       SpO2:    93%   Weight:       Height:         I/O last 24 hours: In: 80 [P O :780]  Out: -     Physical Exam  Constitutional:       General: She is not in acute distress  Appearance: Normal appearance  She is not ill-appearing  HENT:      Head: Normocephalic and atraumatic  Mouth/Throat:      Mouth: Mucous membranes are moist    Eyes:      Extraocular Movements: Extraocular movements intact  Pupils: Pupils are equal, round, and reactive to light  Cardiovascular:      Rate and Rhythm: Normal rate and regular rhythm  Heart sounds: Normal heart sounds  No murmur heard  No friction rub  Pulmonary:      Effort: Pulmonary effort is normal       Breath sounds: Normal breath sounds  Abdominal:      General: There is no distension  Palpations: Abdomen is soft  Tenderness: There is no abdominal tenderness  Musculoskeletal:         General: No swelling or signs of injury  Skin:     General: Skin is warm and dry  Neurological:      General: No focal deficit present  Mental Status: She is alert and oriented to person, place, and time  Mental status is at baseline  Psychiatric:         Mood and Affect: Mood normal          Behavior: Behavior normal        LABORATORY DATA     Labs: I have personally reviewed pertinent reports      Results from last 7 days   Lab Units 09/18/21  0609 09/17/21  1300   WBC Thousand/uL 5 91 7 80   HEMOGLOBIN g/dL 14 5 14 6   HEMATOCRIT % 43 8 42 6   PLATELETS Thousands/uL 227 229   NEUTROS PCT % 64  --    MONOS PCT % 8  --       Results from last 7 days   Lab Units 09/18/21  0610 09/17/21  1300   POTASSIUM mmol/L 4 1 4 2   CHLORIDE mmol/L 108 104   CO2 mmol/L 27 28   BUN mg/dL 15 14   CREATININE mg/dL 0 56* 0 74   CALCIUM mg/dL 8 5 9 4   ALK PHOS U/L 86 --    ALT U/L 19  --    AST U/L 18  --      Results from last 7 days   Lab Units 09/18/21  0610   MAGNESIUM mg/dL 2 5     Results from last 7 days   Lab Units 09/18/21  0610   PHOSPHORUS mg/dL 4 0      Results from last 7 days   Lab Units 09/18/21  0610 09/17/21  1300   INR  1 16 1 03   PTT seconds  --  28         Results from last 7 days   Lab Units 09/17/21  1300   TROPONIN I ng/mL <0 03     Micro:  Lab Results   Component Value Date    BLOODCX No Growth After 5 Days  01/28/2021    BLOODCX No Growth After 5 Days  01/28/2021    URINECX 30,000-39,000 cfu/ml  08/21/2020    URINECX No Growth <1000 cfu/mL 05/05/2019     IMAGING & DIAGNOSTIC TESTING     Imaging: I have personally reviewed pertinent reports  CT stroke alert brain    Result Date: 9/17/2021  Impression: No acute intracranial abnormality  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 and mild microangiopathic change  Findings were directly discussed with Kg Garnett at 1:23 PM  Workstation performed: [id number]     CTA stroke alert (head/neck)    Result Date: 9/17/2021  Impression: Negative CTA head and neck for large vessel occlusion, dissection, aneurysm, or high-grade stenosis  Mild atherosclerotic disease of the head and neck, as detailed above Additional chronic/incidental findings as detailed above  Findings were directly discussed with gK Garnett at 1:23 PM  Workstation performed: [id number]     EKG, Pathology, and Other Studies: I have personally reviewed pertinent reports       ALLERGIES     Allergies   Allergen Reactions    Ciprofloxacin     Clindamycin     Doxycycline     Fluticasone-Salmeterol Other (See Comments)     Leg cramps    Keflex [Cephalexin]     Naproxen Hives and Other (See Comments)     "hives & chest pain"     Nsaids     Penicillins     Sulfa Antibiotics Hives, Itching and Swelling     MEDICATIONS     Current Facility-Administered Medications   Medication Dose Route Frequency Provider Last Rate    acetaminophen 650 mg Oral Q6H PRN PAYAL Jones      atorvastatin  40 mg Oral QPM PAYAL Guzman      travoprost  1 drop Both Eyes HS PAYAL Guzman          acetaminophen, 650 mg, Q6H PRN        Portions of the record may have been created with voice recognition software  Occasional wrong word or "sound a like" substitutions may have occurred due to the inherent limitations of voice recognition software    Read the chart carefully and recognize, using context, where substitutions have occurred     ==  Kodak Bentley MD  [address] [facility]  Internal Medicine Residency PGY-1

## 2021-09-19 VITALS
OXYGEN SATURATION: 94 % | WEIGHT: 168.7 LBS | RESPIRATION RATE: 19 BRPM | HEIGHT: 63 IN | TEMPERATURE: 98.5 F | DIASTOLIC BLOOD PRESSURE: 62 MMHG | BODY MASS INDEX: 29.89 KG/M2 | HEART RATE: 71 BPM | SYSTOLIC BLOOD PRESSURE: 127 MMHG

## 2021-09-19 LAB
ANION GAP SERPL CALCULATED.3IONS-SCNC: 4 MMOL/L (ref 4–13)
BUN SERPL-MCNC: 16 MG/DL (ref 5–25)
CALCIUM SERPL-MCNC: 8.3 MG/DL (ref 8.3–10.1)
CHLORIDE SERPL-SCNC: 110 MMOL/L (ref 100–108)
CO2 SERPL-SCNC: 26 MMOL/L (ref 21–32)
CREAT SERPL-MCNC: 0.7 MG/DL (ref 0.6–1.3)
CRP SERPL QL: 7.3 MG/L
ERYTHROCYTE [SEDIMENTATION RATE] IN BLOOD: 17 MM/HOUR (ref 0–29)
GFR SERPL CREATININE-BSD FRML MDRD: 88 ML/MIN/1.73SQ M
GLUCOSE SERPL-MCNC: 104 MG/DL (ref 65–140)
POTASSIUM SERPL-SCNC: 3.8 MMOL/L (ref 3.5–5.3)
SODIUM SERPL-SCNC: 140 MMOL/L (ref 136–145)

## 2021-09-19 PROCEDURE — 97162 PT EVAL MOD COMPLEX 30 MIN: CPT

## 2021-09-19 PROCEDURE — 99238 HOSP IP/OBS DSCHRG MGMT 30/<: CPT | Performed by: PSYCHIATRY & NEUROLOGY

## 2021-09-19 PROCEDURE — 80048 BASIC METABOLIC PNL TOTAL CA: CPT | Performed by: PSYCHIATRY & NEUROLOGY

## 2021-09-19 PROCEDURE — 86140 C-REACTIVE PROTEIN: CPT | Performed by: PSYCHIATRY & NEUROLOGY

## 2021-09-19 PROCEDURE — 85652 RBC SED RATE AUTOMATED: CPT | Performed by: PSYCHIATRY & NEUROLOGY

## 2021-09-19 RX ORDER — ATORVASTATIN CALCIUM 40 MG/1
40 TABLET, FILM COATED ORAL EVERY EVENING
Qty: 90 TABLET | Refills: 0 | Status: SHIPPED | OUTPATIENT
Start: 2021-09-19 | End: 2021-12-29 | Stop reason: SDUPTHER

## 2021-09-19 RX ORDER — ASPIRIN 81 MG/1
81 TABLET ORAL DAILY
Qty: 90 TABLET | Refills: 0 | Status: SHIPPED | OUTPATIENT
Start: 2021-09-19

## 2021-09-19 RX ORDER — RIBOFLAVIN (VITAMIN B2) 400 MG
400 TABLET ORAL 2 TIMES DAILY
Qty: 90 TABLET | Refills: 0 | Status: SHIPPED | OUTPATIENT
Start: 2021-09-19 | End: 2021-09-21 | Stop reason: SDUPTHER

## 2021-09-19 RX ADMIN — PANTOPRAZOLE SODIUM 40 MG: 40 TABLET, DELAYED RELEASE ORAL at 05:06

## 2021-09-19 NOTE — DISCHARGE SUMMARY
NEUROLOGY RESIDENCY - DISCHARGE SUMMARY     Name: Fermin Whitt   Age & Sex: 80 y o  female   MRN: [id number]  Unit/Bed#: [facility] 733-01   Encounter: [id number]    Discharging Resident Physician: Julieta Adams DO  Attending: Riki Zafar MD  PCP: PAYAL Melgoza  Admission Date: 9/17/2021  Discharge Date: 09/19/21    Fermin Whitt will need follow up in in 4 - 6 weeks with neurovascular and headache attending (Dr Ilda Miller)  She will require an outpatient MALENA to complete workup   This was ordered at time of discharge    ASSESSMENT & PLAN     * Acute CVA (cerebrovascular accident) [facility])  Assessment & Plan  Patient presented w/ acute ischemic stroke   Presenting sx: dysarthria, potentially disabling   NIHSS: 3   tPA: given at 1339 9/17/21   Mechanical thrombectomy - no LVO target   Workup  o CTH/CTA: small old L cerebellar infarct; negative for LVO, dissection, high-grade stenosis, mild intra- and extra-cranial atherosclerosis  o MRI: acute sub-cm left pontine infarct, in vertebrobasilar territory; no abnormal enhancement; mild to moderate microangiopathic changes  o Labs: HbA1c 5 6, LDL 66  o Repeat CTH: no hemorrhagic transformation s/p tPA  o Echo: LVEF 60%, trace MR/TR, no clear RWMA   Exam: minimal sx persist at this time, mild cognitive deficits    Plan:   Patient stable for d/c at this time   Continue ASA 81 mg daily and Atorvastatin 40 mg QHS   Will need outpatient MALENA to complete workup   Follow up with neurovascular team in 4-6 weeks   Stroke education provided   Advised to monitor BP and follow closely with PCP       Headache  Assessment & Plan  · 5/10, chronic for over 1 yr  · ESR normal, CRP 7 3  · Recommend outpatient follow up  · Can try magnesium oxide and riboflavin for headache prevention    Pure hypertriglyceridemia  Assessment & Plan  Lab Results   Component Value Date    CHOLESTEROL 121 09/18/2021    TRIG 72 09/18/2021    HDL 41 09/18/2021    LDLCALC 66 09/18/2021  Atorvastatin 40 mg QHS    GERD (gastroesophageal reflux disease)  Assessment & Plan  Replaced home med w/ protonix    COPD, group A, by GOLD 2017 classification [facility])  Assessment & Plan  Patient has multiple inhalers on home med list but is reportedly not taking these at this time    Primary gall bladder adenocarcinoma ([address] )  Assessment & Plan  No signs of abnormal enhancement on MRI       Disposition:   Home    Reason for Admission: right face, arm, and leg numbness, word finding difficulties    Consultations During Hospital Stay:  · Internal medicine    Procedures Performed:   · none    Significant Findings / Test Results:   · CTH: No acute intracranial abnormality  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 and mild microangiopathic change  · CTA: Negative CTA head and neck for large vessel occlusion, dissection, aneurysm, or high-grade stenosis  Mild atherosclerotic disease of the head and neck, as detailed above  · MRI: Acute, subcentimeter left pontine infarct, vertebral basilar vascular territory  No intracranial hemorrhage status post TPA  Other nonemergent findings above  · Telemetry: no events  · LDL 66, HgbA1c 5 6%  · ESR normal, CRP 7 3    Incidental Findings:   · n/a     Test Results Pending at Discharge (will require follow up):   · none     Outpatient Tests Requested:  · MALENA    Complications:  none    Hospital Course:     Avelnia Riggs is a 80 y o  female patient who originally presented [facility] the ED at [facility] on 9/17/2021 due to sudden onset of left face, arm, and leg tingling, unsteady gait, and difficulties with word finding  LKW at 10:40 AM  Presented approximately 1 5 hours after symptoms started  Stroke alert was called at 12:50 PM  Initial NIHSS of 3  CTH and CTA were negative  Patient was amenable to tPA and received in ED at 1:38 PM  She was transferred to [facility] for higher level of care  After tPA, patient reported significant improvement in her symptoms  Continued to have very mild word finding difficulties and difficulty with complex tasks  No focal weakness or sensory changes  Additional workup for stroke was completed at [employer]  MRI was notable for a small stroke in the left fátima consistent with posterior circulation territory  Given these findings, etiology is likely small vessel disease versus vertebral artery disease    Yola Lim also complained of a chronic headache during her admission that has been present for over 1 year  Basic labs were obtained  She will follow up as an outpatient  Condition at Discharge: good     Discharge Day Visit / Exam:     Subjective:  Patient seen and examined  No acute events overnight  Yola Lim reports that she feels back to normal  Still has an occasional difficulty with word finding but overall much improved  Denies weakness, numbness, or tingling  No focal or lateralizing deficits  No slurred speech of facial droop  Swallowing is okay  She has been up and walking around and doing well  A 10-point ROS was completed and is negative except for what is mentioned above  Vitals: Blood Pressure: 127/62 (09/19/21 0729)  Pulse: 71 (09/19/21 0729)  Temperature: 98 5 °F (36 9 °C) (09/19/21 0729)  Temp Source: Oral (09/19/21 0413)  Respirations: 19 (09/19/21 0729)  Height: 5' 3" (160 cm) (09/18/21 0700)  Weight - Scale: 76 5 kg (168 lb 11 2 oz) (09/19/21 0600)  SpO2: 94 % (09/19/21 0729)    Exam:     Physical Exam  Vitals and nursing note reviewed  Constitutional:       General: She is not in acute distress  Appearance: Normal appearance  She is obese  She is not ill-appearing  HENT:      Head: Normocephalic and atraumatic  Right Ear: External ear normal       Left Ear: External ear normal       Nose: Nose normal  No congestion  Mouth/Throat:      Mouth: Mucous membranes are moist       Pharynx: Oropharynx is clear  Eyes:      General: No scleral icterus       Extraocular Movements: Extraocular movements intact  Conjunctiva/sclera: Conjunctivae normal       Pupils: Pupils are equal, round, and reactive to light  Cardiovascular:      Rate and Rhythm: Normal rate and regular rhythm  Pulses: Normal pulses  Heart sounds: Normal heart sounds  No murmur heard  Pulmonary:      Effort: Pulmonary effort is normal  No respiratory distress  Abdominal:      General: Abdomen is flat  There is no distension  Musculoskeletal:      Cervical back: Neck supple  No rigidity  Right lower leg: No edema  Left lower leg: No edema  Skin:     General: Skin is warm and dry  Capillary Refill: Capillary refill takes less than 2 seconds  Neurological:      General: No focal deficit present  Mental Status: She is alert and oriented to person, place, and time  Coordination: Finger-Nose-Finger Test and Heel to David Holler Test normal       Deep Tendon Reflexes: Strength normal       Reflex Scores:       Bicep reflexes are 2+ on the right side and 2+ on the left side  Brachioradialis reflexes are 2+ on the right side and 2+ on the left side  Patellar reflexes are 2+ on the right side and 2+ on the left side  Achilles reflexes are 2+ on the right side and 2+ on the left side  Psychiatric:         Mood and Affect: Mood normal          Speech: Speech normal          Behavior: Behavior normal          Neurologic Exam     Mental Status   Oriented to person, place, and time  Follows 2 step commands  Speech: speech is normal   Level of consciousness: alert  Able to perform simple calculations  Able to name object  Able to read  Able to repeat  No dysarthria or aphasia noted     Cranial Nerves   Cranial nerves II through XII intact  CN III, IV, VI   Pupils are equal, round, and reactive to light  Motor Exam   Muscle bulk: normal  Overall muscle tone: normal  Right arm pronator drift: absent  Left arm pronator drift: absent    Strength   Strength 5/5 throughout       Sensory Exam   Light touch normal    Proprioception normal    Pinprick normal      Gait, Coordination, and Reflexes     Coordination   Finger to nose coordination: normal  Heel to shin coordination: normal    Reflexes   Right brachioradialis: 2+  Left brachioradialis: 2+  Right biceps: 2+  Left biceps: 2+  Right patellar: 2+  Left patellar: 2+  Right achilles: 2+  Left achilles: 2+  Right plantar: normal  Left plantar: normal  Right Mccormick: absent  Left Mccormick: absent  Right ankle clonus: absent  Left ankle clonus: absent      Discussion with Patient/Family: Updated Bere Cunningham regarding all results and plan of care  Discussed plan to d/c home today  Will refer to outpatient PT/OT as needed  She will need outpatient MALENA to complete workup  She is to continue 81 mg ASA and 40 mg Atorvastatin daily  For headache prevention, she can try magnesium oxide and riboflavin  She was instructed to check her BP on a regular basis  Will need close outpatient follow up with both PCP and neurology  Patient understands and agrees with the plan of care  All questions and concerns addressed  Discharge instructions/Information to patient and family:   See after visit summary for information provided to patient and family  Provisions for Follow-Up Care:  See after visit summary for information related to follow-up care and any pertinent home health orders        Planned Readmission: Will need outpatient MALENA    Discharge Medications:  See after visit summary for reconciled discharge medications provided to patient and family       ==  DO Shital [address] Neurology Residency, PGY-2

## 2021-09-19 NOTE — QUICK NOTE
Discussed with primary service, no need for SLIM consult  Planning for discharge today  Please call with any questions or concerns

## 2021-09-19 NOTE — ASSESSMENT & PLAN NOTE
· Lipid panel noted   · Currently on increased dose Lipitor 40 mg daily, neuro recommending discharge on Rosuvastatin 40 mg

## 2021-09-19 NOTE — PHYSICAL THERAPY NOTE
Physical Therapy Evaluation     Patient's Name: Bienvenido Arellano    Admitting Diagnosis  CVA (cerebral vascular accident) [facility]) [I63 9]    Problem List  Patient Active Problem List   Diagnosis    Depression    Seasonal allergies    Primary gall bladder adenocarcinoma ([place] [address] )    Pure hypertriglyceridemia    Diverticulosis    Murmur    SOLORZANO (dyspnea on exertion)    COPD, group A, by GOLD 2017 classification ([place] [address] )    RUQ abdominal pain    Lightheadedness    Acute CVA (cerebrovascular accident) ([place] [address] )    GERD (gastroesophageal reflux disease)    Glaucoma    Headache       Past Medical History  Past Medical History:   Diagnosis Date    Cancer ([place] [address] )     gallbladder    COVID-19     GERD (gastroesophageal reflux disease)     Glaucoma     History of gallbladder cancer     Approx  3 years ago    Hyperlipemia     Murmur     Rheumatic fever        Past Surgical History  Past Surgical History:   Procedure Laterality Date    CHOLECYSTECTOMY      TONSILLECTOMY  CHILDHOOD        09/19/21 0911   PT Last Visit   PT Visit Date 09/19/21   Note Type   Note type Evaluation   Pain Assessment   Pain Assessment Tool 0-10   Pain Score No Pain   Home Living   Type of [address] [place] [address] Two level;Bed/bath upstairs;Stairs to enter with rails   Home Equipment   (denies DME)   Additional Comments Pt resides in [facility] w/ 0 PENNIE and FF to bed/bath on 2nd floor   Pt reports ambulating w/out AD PTA   Prior Function   Level of [place] Independent with ADLs and functional mobility   Lives With Son;Daughter   Receives Help From Family   ADL Assistance Independent   IADLs Independent   Falls in the last 6 months 0   Restrictions/Precautions   Weight Bearing Precautions Per Order No   Other Precautions Telemetry   General   Family/Caregiver Present No   Cognition   Overall Cognitive Status WFL   Arousal/Participation Alert   Orientation Level Oriented X4   Memory Within functional limits   Following Commands Follows all commands and directions without difficulty   Comments Pt pleasant and cooperative to work w/ therapy   RLE Assessment   RLE Assessment WFL   LLE Assessment   LLE Assessment WFL   Coordination   Movements are Fluid and Coordinated 1   Light Touch   RLE Light Touch Grossly intact   LLE Light Touch Grossly intact   Bed Mobility   Supine to Sit 7  Independent   Sit to Supine 7  Independent   Additional Comments Pt lying supine upon PT arrival  Pt returned lying supine at end of session w/ all needs within reach   Transfers   Sit to Stand 7  Independent   Stand to Sit 7  Independent   Additional Comments Transfers w/out AD   Ambulation/Elevation   Gait pattern WNL   Gait Assistance 7  Independent   Assistive Device None   Distance 200ft   Stair Management Assistance 6  Modified independent   Stair Management Technique No rails; Alternating pattern; Foreward   Number of Stairs 7   Balance   Static Sitting Normal   Dynamic Sitting Good   Static Standing Good   Dynamic Standing Good   Ambulatory Fair +   Endurance Deficit   Endurance Deficit No   Activity Tolerance   Activity Tolerance Patient tolerated treatment well   Nurse Made Aware yes   Assessment   Assessment Pt is 80 y o  female seen for PT evaluation s/p admit to [facility] on 9/17/2021 w/ Acute CVA (cerebrovascular accident) ([facility] [address] )  Pt admitted from [facility] w/ sudden onset R side facila, arm, and leg numbness and weakness  9/17 Negative CTA head and neck for large vessel occlusion, dissection, aneurysm, or high-grade stenosis; mild atherosclerotic disease of the head and neck  Pt is s/p tPA at 1:30 pm  She was transferred to [place] [id number] for close monitoring  PT consulted to assess pt's functional mobility and d/c needs  Order placed for PT eval and tx, w/ up w/ A order  Comorbidities affecting pt's physical performance at time of assessment include: gall bladder adenocarcinoma, glaucoma, diverticulitis, COPD   PTA, pt was independent w/ all functional mobility w/ no AD and lives w/ son and daughter in two level house w/ 0 PENNIE and FF to bed/bath  Personal factors affecting pt at time of IE include: inaccessible home environment, lives in two story house and inability to perform IADLs  Please find objective findings from PT assessment regarding body systems outlined above with impairments and limitations including decreased endurance, decreased functional mobility tolerance and altered sensation  Pt performed bed mobility, transfers, and mobility of 200ft w out AD at IND level  Pt performed 7 stairs at Mod I  The following objective measures performed on IE also reveal limitations: Barthel Index: 371/646 and AM-PAC 6-Clicks: 72/94  Pt's clinical presentation is currently evolving seen in pt's presentation of recent admission for CVA requiring medical attention, recent decline in function as compared to baseline, decreased sensation, telemetry monitoring  Pt appears to be functioning at baseline level with functional mobility; therefore, requires no immediate acute skilled PT services at this time  Pt with no further questions or concerns regarding PT services  Will D/C PT at this time  Please re-consult if pt's medical status changes  From PT/mobility standpoint, recommendation at time of d/c would be no rehabilitation needs pending progress in order to facilitate return to PLOF     Goals   Patient Goals to return home today   Plan   PT Frequency   (D/C PT)   Recommendation   PT Discharge Recommendation No rehabilitation needs   PT - OK to Discharge Yes   AM-PAC Basic Mobility Inpatient   Turning in Bed Without Bedrails 4   Lying on Back to Sitting on Edge of Flat Bed 4   Moving Bed to Chair 4   Standing Up From Chair 4   Walk in Room 4   Climb 3-5 Stairs 4   Basic Mobility Inpatient Raw Score 24   Basic Mobility Standardized Score 57 68   Modified Greenwood Scale   Modified [place]wood Scale 2   Barthel Index   Feeding 10   Bathing 5   Grooming Score 5   Dressing Score 10 Bladder Score 10   Bowels Score 10   Toilet Use Score 10   Transfers (Bed/Chair) Score 15   Mobility (Level Surface) Score 15   Stairs Score 10   Barthel Index Score 100     The patient's AM-PAC Basic Mobility Inpatient Short Form Raw Score is 24, Standardized Score is 57 68  A standardized score greater than 42 9 suggests the patient may benefit from discharge to home  Please also refer to the recommendation of the Physical Therapist for safe discharge planning      Lucas Moctezuma, PT, DPT

## 2021-09-19 NOTE — DISCHARGE INSTR - AVS FIRST PAGE
· Please take 81 mg of aspirin daily and 40 mg of atorvastatin  · Please check your blood pressure, goal is to be < 130/90  · For your headaches, you can try magnesium oxide 400 mg twice daily and riboflavin (vitamin B2) 400 mg twice daily  Magnesium can cause diarrhea and you can decrease if you develop this  Riboflavin will make your urine bright yellow  · We will schedule you for a follow up appointment in the neurology clinic in 4-6 weeks  · You will also need an outpatient MALENA (transesophageal echo) to make sure that you do not have a hole in your heart   The scheduling department will call you to set this up  · Please go to the ER if you have any symptoms concerning with stroke  · Follow up with your eye doctor

## 2021-09-19 NOTE — ASSESSMENT & PLAN NOTE
· H/o gallbladder adenocarcinoma T2N1M0 (stg IIIB)  · S/p partial liver resection and portal lymphadenectomy by Dr Tania Mcneill on 8/11/17, chemo and radiation therapy  · Follows outpatient with oncology at [facility]

## 2021-09-19 NOTE — ASSESSMENT & PLAN NOTE
· 5/10, chronic for over 1 yr  · ESR normal, CRP 7 3  · Recommend outpatient follow up  · Can try magnesium oxide and riboflavin for headache prevention

## 2021-09-19 NOTE — CASE MANAGEMENT
LOS: 2  Not a bundle   Re-admission risk ([place]) low     CM met with pt at bedside in order to introduce CM role and discuss d/c planning  CM assessment completed  Pt resides with her two adult children in a two story home with 0 PENNIE and 15 steps between floors  Pt reports prior to admission she was independent with all ADLs and ambulation  No use of DME or HHC/IP STR hx prior  No D&A or MH tx hx reported  PCP is Jaun Rey [facility]acy is [facility] located in [facility]  Pt reports formal POA/LW in place, which is currently not on file at this time  Main emergency contact is pt's daughter Inez Oropeza P: [phone]  Pts family will provide transport home upon d/c      CM department will remain available for discharge needs and or concerns  CM reviewed d/c planning process including the following: identifying help at home, patient preference for d/c planning needs, Discharge Lounge, [facility] Meds to Bed program, availability of treatment team to discuss questions or concerns patient and/or family may have regarding understanding medications and recognizing signs and symptoms once discharged  CM also encouraged patient to follow up with all recommended appointments after discharge  Patient advised of importance for patient and family to participate in managing patients medical well being

## 2021-09-19 NOTE — ASSESSMENT & PLAN NOTE
· Presented to [facility] with acute onset right sided face/arm/leg weakness and numbness with NIHSS of 3 and daughter stated pt was having issues with finding words and unsteady on her feet  · [address] stroke alert 9/17: no acute intracranial abnormality; 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 and mild microangiopathic change  · CTA stroke alert 9/17: negative for large vessel occlusion, dissection, aneurysm or high grade stenosis; mild atherosclerotic disease  · Pt received tPA approximately 3 hours after last known well  · Repeat CTH: no acute intracranial abnormality; no intracranial hemorrhage status post tPA  · MRI brain: acute sub-cm left pontine infarct, in vertebrobasilar territory; no abnormal enhancement; mild to moderate microangiopathic changes  · Management as per primary team, Neurology   · SBP at goal < 130 without need for antihypertensives   · PT recommendations appreciated, no rehab needs   · CM following

## 2021-09-19 NOTE — CONSULTS
[address] [facility] 1950[facility] 80 [facility]  [facility] MRN: [id number]  Unit/Bed#: [facility] 733-01 Encounter: [id number]  Primary Care Provider: PAYAL Hines   Date and time admitted to hospital: 9/17/2021  8:40 PM    Inpatient consult to Internal Medicine  Consult performed by: Luana Jaramillo PA-C  Consult ordered by: Sabi Calderon MD        * Acute CVA (cerebrovascular accident) [facility])  Assessment & Plan  · Presented to [facility] with acute onset right sided face/arm/leg weakness and numbness with NIHSS of 3 and daughter stated pt was having issues with finding words and unsteady on her feet  · [address] stroke alert 9/17: no acute intracranial abnormality; 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 and mild microangiopathic change  · CTA stroke alert 9/17: negative for large vessel occlusion, dissection, aneurysm or high grade stenosis; mild atherosclerotic disease  · Pt received tPA approximately 3 hours after last known well  · Repeat CTH: no acute intracranial abnormality; no intracranial hemorrhage status post tPA  · MRI brain: acute sub-cm left pontine infarct, in vertebrobasilar territory; no abnormal enhancement; mild to moderate microangiopathic changes  · Management as per primary team, Neurology   · SBP at goal < 130 without need for antihypertensives   · PT/OT/speech     Pure hypertriglyceridemia  Assessment & Plan  · Lipid panel noted   · Currently on increased dose Lipitor 40 mg daily, neuro recommending discharge on Rosuvastatin 40 mg    Primary gall bladder adenocarcinoma ([facility] [address] )  Assessment & Plan  · H/o gallbladder adenocarcinoma T2N1M0 (stg IIIB)  · S/p partial liver resection and portal lymphadenectomy by Dr Nandini Christina on 8/11/17, chemo and radiation therapy  · Follows outpatient with oncology at [address] [place] [address]  · Continue travoprost drops both eyes HS    GERD (gastroesophageal reflux disease)  Assessment & Plan  · Protonix subbed for home omeprazole      VTE Prophylaxis:   High Risk (Score >/= 5) - Pharmacological DVT Prophylaxis Contraindicated  Sequential Compression Devices Ordered  Recommendations for Discharge:  · Patient is medically stable for discharge once therapy evaluations completed and deemed appropriate by primary service  Counseling / Coordination of Care Time: 15 minutes Greater than 50% of total time spent on patient counseling and coordination of care  Collaboration of Care: Were Recommendations Directly Discussed with Primary Treatment Team? No     History of Present Illness:  Shayne Luciano is a 80 y o  female who is originally admitted to the neurology service due to stroke like symptoms  We are consulted for medical management of hypertriglyceridemia, GERD and glaucoma  Review of Systems:  Review of Systems    Past Medical and Surgical History:   Past Medical History:   Diagnosis Date    Cancer ([facility] [address] )     gallbladder    COVID-19     GERD (gastroesophageal reflux disease)     Glaucoma     History of gallbladder cancer     Approx  3 years ago    Hyperlipemia     Murmur     Rheumatic fever        Past Surgical History:   Procedure Laterality Date    CHOLECYSTECTOMY      TONSILLECTOMY  CHILDHOOD       Meds/Allergies:  all medications and allergies reviewed    Allergies:    Allergies   Allergen Reactions    Ciprofloxacin     Clindamycin     Doxycycline     Fluticasone-Salmeterol Other (See Comments)     Leg cramps    Keflex [Cephalexin]     Naproxen Hives and Other (See Comments)     "hives & chest pain"     Nsaids     Penicillins     Sulfa Antibiotics Hives, Itching and Swelling       Social History:  Marital Status: /Civil Union  Substance Use History:   Social History     Substance and Sexual Activity   Alcohol Use Never     Social History     Tobacco Use   Smoking Status Former Smoker   Smokeless Tobacco Never Used     Social History     Substance and Sexual Activity   Drug Use Never Family History:  Family History   Problem Relation Age of Onset    Heart attack Mother     Kidney disease Father     Breast cancer Sister 77    No Known Problems Daughter     No Known Problems Maternal Grandmother     No Known Problems Paternal Grandmother     No Known Problems Sister     No Known Problems Maternal Aunt     No Known Problems Maternal Aunt     No Known Problems Maternal Aunt     No Known Problems Paternal Aunt     No Known Problems Paternal Aunt        Physical Exam:   Vitals:   Blood Pressure: 127/62 (09/19/21 0729)  Pulse: 71 (09/19/21 0729)  Temperature: 98 5 °F (36 9 °C) (09/19/21 0729)  Temp Source: Oral (09/19/21 0413)  Respirations: 19 (09/19/21 0729)  Height: 5' 3" (160 cm) (09/18/21 0700)  Weight - Scale: 76 5 kg (168 lb 11 2 oz) (09/19/21 0600)  SpO2: 94 % (09/19/21 0729)    Physical Exam ***    Additional Data:   Lab Results:    Results from last 7 days   Lab Units 09/18/21  0609   WBC Thousand/uL 5 91   HEMOGLOBIN g/dL 14 5   HEMATOCRIT % 43 8   PLATELETS Thousands/uL 227   NEUTROS PCT % 64   LYMPHS PCT % 23   MONOS PCT % 8   EOS PCT % 4     Results from last 7 days   Lab Units 09/19/21  0519 09/18/21  0610   SODIUM mmol/L 140 139   POTASSIUM mmol/L 3 8 4 1   CHLORIDE mmol/L 110* 108   CO2 mmol/L 26 27   BUN mg/dL 16 15   CREATININE mg/dL 0 70 0 56*   ANION GAP mmol/L 4 4   CALCIUM mg/dL 8 3 8 5   ALBUMIN g/dL  --  3 3*   TOTAL BILIRUBIN mg/dL  --  1 21*   ALK PHOS U/L  --  86   ALT U/L  --  19   AST U/L  --  18   GLUCOSE RANDOM mg/dL 104 95     Results from last 7 days   Lab Units 09/18/21  0610   INR  1 16     Results from last 7 days   Lab Units 09/17/21  1300   TROPONIN I ng/mL <0 03     Lab Results   Component Value Date/Time    HGBA1C 5 6 09/18/2021 06:10 AM     Results from last 7 days   Lab Units 09/17/21  1248   POC GLUCOSE mg/dl 86           Imaging: Reviewed radiology reports from this admission including: CT head and MRI brain  MRI brain w wo contrast   Final Result by Bonifacio Keen MD (09/18 1931)      Acute, subcentimeter left pontine infarct, vertebral basilar vascular territory  No intracranial hemorrhage status post TPA  Other nonemergent findings above  Workstation performed: [id number]         CT head wo contrast   Final Result by Bonifacio Keen MD (09/18 1727)      No acute intracranial abnormality  No intracranial hemorrhage status post TPA  Workstation performed: [id number]             EKG, Pathology, and Other Studies Reviewed on Admission:   · EKG: NSR  HR 74 with PACs  ** Please Note: This note may have been constructed using a voice recognition system   **

## 2021-09-19 NOTE — UTILIZATION REVIEW
Initial Clinical Review    Admission: Date/Time/Statement:   Admission Orders (From admission, onward)     Ordered        09/17/21 2154  Inpatient Admission  Once                   Orders Placed This Encounter   Procedures    Inpatient Admission     Standing Status:   Standing     Number of Occurrences:   1     Order Specific Question:   Level of Care     Answer:   Critical Care [15]     Order Specific Question:   Estimated length of stay     Answer:   More than 2 Midnights     Order Specific Question:   Certification     Answer:   I certify that inpatient services are medically necessary for this patient for a duration of greater than two midnights  See H&P and MD Progress Notes for additional information about the patient's course of treatment  Initial Presentation: 79 y/o female presents to [facility] ED as transfer form [facility] ED as [address]  [address]  [address] with sudden onset R sided facial, arm and leg numbness and weakness  NIHSS of 3  Last known well at 10:40 am 9/17  Daughter also notes word finding difficulties and unsteady gait  Stroke alert was called  CTH, CTA head/neck no acute abnl  Pt is s/p tPA at 1:30 pm  Tx'd to [facility] for higher level of care with close monitoring  Admitted inpatient to [facility] 2 step down bed -- q1 neuro checks  Repeat CTH at 1:30 pm 9/18, 24 hours after tPA administered  SCD's only for DVT ppx  SBP goal <160  Tylenol prn  Lipid panel and HgbA1C ordered  PT/OT/ST evals  Continue home po meds  Neuro consulted  Neuro consult 9/18 -- Tiny posterior circulation stroke  Start aspirin 325 then 81 mg daily  SBP goal < 130  Suspect vessel to vessel from vertebral osteal disease vs small vessel  Rosuvastatin 40 when discharged  PT/OT/Speech    Date: 9/18  Day 2: Pt continues noting right hand numbness and tingling  Denies any other complaints  Denies any new or worsening headache, nausea, vomiting or light headedness   CTH 24 hours post tPA shows no acute intracranial abnormality and no intracranial hemorrhage status post TPA  Transfer to [facility] unit  Continue current po meds  q4 neuro checks  Reg diet  ED Triage Vitals [09/17/21 2300]   Temperature Pulse Respirations Blood Pressure SpO2   98 2 °F (36 8 °C) 67 18 119/58 93 %      Temp Source Heart Rate Source Patient Position - Orthostatic VS BP Location FiO2 (%)   Oral Monitor Lying Left arm --      Pain Score       No Pain          Wt Readings from Last 1 Encounters:   09/19/21 76 5 kg (168 lb 11 2 oz)     Additional Vital Signs:   Date/Time  Temp  Pulse  Resp  BP  MAP (mmHg)  SpO2  Calculated FIO2 (%) - Nasal Cannula  Nasal Cannula O2 Flow Rate (L/min)  O2 Device   09/18/21 2100  98 2 °F (36 8 °C)  59  --  123/67  86  --  --  --  --   09/18/21 20:58:56  98 2 °F (36 8 °C)  60  18  123/67  86  95 %  --  --  None (Room air)   09/18/21 1800  --  72  --  128/60  86  --  --  --  --   09/18/21 1200  --  72  --  121/59  85  93 %  --  --  --   09/18/21 0900  --  64  18  115/63  77  --  --  --  Nasal cannula   09/18/21 0800  98 9 °F (37 2 °C)  67  --  111/53  77  --  --  --  --   09/18/21 0700  --  57  16  112/56  81  97 %  32  3 L/min  Nasal cannula   09/18/21 0130  --  60  --  118/58  84  96 %  32  3 L/min  Nasal cannula   09/18/21 0000  --  65  --  110/56  78  --  --  --  --   09/17/21 2300  98 2 °F (36 8 °C)  67  18  119/58  83  93 %  --  --  None (Room air)       Pertinent Labs/Diagnostic Test Results:   EKG 9/17 -- Sinus rhythm with Premature atrial complexes    CT head 9/17 -- No acute intracranial abnormality   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 and mild microangiopathic change  CTA head/neck 9/17 - Negative CTA head and neck for large vessel occlusion, dissection, aneurysm, or high-grade stenosis   Mild atherosclerotic disease of the head and neck, as detailed above     CT head 9/18 -- No acute intracranial abnormality   No intracranial hemorrhage status post TPA       MRI brain 9/18 -- Acute, subcentimeter left pontine infarct, vertebral basilar vascular territory  No intracranial hemorrhage status post TPA  Results from last 7 days   Lab Units 09/17/21  1405   SARS-COV-2  Negative     Results from last 7 days   Lab Units 09/18/21  0609 09/17/21  1300   WBC Thousand/uL 5 91 7 80   HEMOGLOBIN g/dL 14 5 14 6   HEMATOCRIT % 43 8 42 6   PLATELETS Thousands/uL 227 229   NEUTROS ABS Thousands/µL 3 77  --      Results from last 7 days   Lab Units 09/18/21  0610 09/17/21  1300   SODIUM mmol/L 139 141   POTASSIUM mmol/L 4 1 4 2   CHLORIDE mmol/L 108 104   CO2 mmol/L 27 28   ANION GAP mmol/L 4 9   BUN mg/dL 15 14   CREATININE mg/dL 0 56* 0 74   EGFR ml/min/1 73sq m 95 82   CALCIUM mg/dL 8 5 9 4   CALCIUM, IONIZED mmol/L 1 14  --    MAGNESIUM mg/dL 2 5  --    PHOSPHORUS mg/dL 4 0  --      Results from last 7 days   Lab Units 09/18/21  0610   AST U/L 18   ALT U/L 19   ALK PHOS U/L 86   TOTAL PROTEIN g/dL 7 4   ALBUMIN g/dL 3 3*   TOTAL BILIRUBIN mg/dL 1 21*     Results from last 7 days   Lab Units 09/17/21  1248   POC GLUCOSE mg/dl 86     Results from last 7 days   Lab Units 09/18/21  0610 09/17/21  1300   GLUCOSE RANDOM mg/dL 95 96       Results from last 7 days   Lab Units 09/18/21  0610   HEMOGLOBIN A1C % 5 6   EAG mg/dl 114     Results from last 7 days   Lab Units 09/17/21  1300   TROPONIN I ng/mL <0 03       Results from last 7 days   Lab Units 09/18/21  0610 09/17/21  1300   PROTIME seconds 14 4 13 6   INR  1 16 1 03   PTT seconds  --  28         Past Medical History:   Diagnosis Date    [facility]     gallbladder    COVID-19     GERD (gastroesophageal reflux disease)     Glaucoma     History of gallbladder cancer     Approx   3 years ago    Hyperlipemia     Murmur     Rheumatic fever      Present on Admission:   [facility] )   Pure hypertriglyceridemia   GERD (gastroesophageal reflux disease)   Glaucoma   Primary gall bladder adenocarcinoma (HCC)      Admitting Diagnosis: CVA (cerebral vascular accident) [facility]) [I63 9]  Age/Sex: 80 y o  female  Admission Orders:  Scheduled Medications:  atorvastatin, 40 mg, Oral, QPM  pantoprazole, 40 mg, Oral, Early Morning  travoprost, 1 drop, Both Eyes, HS         PRN Meds:  acetaminophen, 650 mg, Oral, Q6H PRN        IP CONSULT TO NEUROLOGY  IP CONSULT TO CASE MANAGEMENT  IP CONSULT TO INTERNAL MEDICINE    Network Utilization Review Department  ATTENTION: Please call with any questions or concerns to [phone] and carefully listen to the prompts so that you are directed to the right person  All voicemails are confidential   Suzi Hare all requests for admission clinical reviews, approved or denied determinations and any other requests to dedicated fax number below belonging to the campus where the patient is receiving treatment   List of dedicated fax numbers for the Facilities:  [address] DENIALS (Administrative/Medical Necessity) [phone]   [address] (Maternity/NICU/Pediatrics) [phone] [address] [address] [place] [address] [address] [facility] [place] [address] [phone]

## 2021-09-19 NOTE — PLAN OF CARE
Problem: MOBILITY - ADULT  Goal: Maintain or return to baseline ADL function  Description: INTERVENTIONS:  -  Assess patient's ability to carry out ADLs; assess patient's baseline for ADL function and identify physical deficits which impact ability to perform ADLs (bathing, care of mouth/teeth, toileting, grooming, dressing, etc )  - Assess/evaluate cause of self-care deficits   - Assess range of motion  - Assess patient's mobility; develop plan if impaired  - Assess patient's need for assistive devices and provide as appropriate  - Encourage maximum independence but intervene and supervise when necessary  - Involve family in performance of ADLs  - Assess for home care needs following discharge   - Consider OT consult to assist with ADL evaluation and planning for discharge  - Provide patient education as appropriate  Outcome: Progressing  Goal: Maintains/Returns to pre admission functional level  Description: INTERVENTIONS:  - Perform BMAT or MOVE assessment daily    - Set and communicate daily mobility goal to care team and patient/family/caregiver  - Collaborate with rehabilitation services on mobility goals if consulted  - Perform Range of Motion  times a day  - Reposition patient every  hours    - Dangle patient  times a day  - Stand patient  times a day  - Ambulate patient  times a day  - Out of bed to chair  times a day   - Out of bed for meals  times a day  - Out of bed for toileting  - Record patient progress and toleration of activity level   Outcome: Progressing     Problem: Potential for Falls  Goal: Patient will remain free of falls  Description: INTERVENTIONS:  - Educate patient/family on patient safety including physical limitations  - Instruct patient to call for assistance with activity   - Consult OT/PT to assist with strengthening/mobility   - Keep Call bell within reach  - Keep bed low and locked with side rails adjusted as appropriate  - Keep care items and personal belongings within reach  - Initiate and maintain comfort rounds  - Make Fall Risk Sign visible to staff  - Offer Toileting every  Hours, in advance of need  - Initiate/Maintain alarm  - Obtain necessary fall risk management equipment:   - Apply yellow socks and bracelet for high fall risk patients  - Consider moving patient to room near nurses station  Outcome: Progressing     Problem: Nutrition/Hydration-ADULT  Goal: Nutrient/Hydration intake appropriate for improving, restoring or maintaining nutritional needs  Description: Monitor and assess patient's nutrition/hydration status for malnutrition  Collaborate with interdisciplinary team and initiate plan and interventions as ordered  Monitor patient's weight and dietary intake as ordered or per policy  Utilize nutrition screening tool and intervene as necessary  Determine patient's food preferences and provide high-protein, high-caloric foods as appropriate       INTERVENTIONS:  - Monitor oral intake, urinary output, labs, and treatment plans  - Assess nutrition and hydration status and recommend course of action  - Evaluate amount of meals eaten  - Assist patient with eating if necessary   - Allow adequate time for meals  - Recommend/ encourage appropriate diets, oral nutritional supplements, and vitamin/mineral supplements  - Order, calculate, and assess calorie counts as needed  - Recommend, monitor, and adjust tube feedings and TPN/PPN based on assessed needs  - Assess need for intravenous fluids  - Provide specific nutrition/hydration education as appropriate  - Include patient/family/caregiver in decisions related to nutrition  Outcome: Progressing     Problem: NEUROSENSORY - ADULT  Goal: Achieves stable or improved neurological status  Description: INTERVENTIONS  - Monitor and report changes in neurological status  - Monitor vital signs such as temperature, blood pressure, glucose, and any other labs ordered   - Initiate measures to prevent increased intracranial pressure  - Monitor for seizure activity and implement precautions if appropriate      Outcome: Progressing  Goal: Remains free of injury related to seizures activity  Description: INTERVENTIONS  - Maintain airway, patient safety  and administer oxygen as ordered  - Monitor patient for seizure activity, document and report duration and description of seizure to physician/advanced practitioner  - If seizure occurs,  ensure patient safety during seizure  - Reorient patient post seizure  - Seizure pads on all 4 side rails  - Instruct patient/family to notify RN of any seizure activity including if an aura is experienced  - Instruct patient/family to call for assistance with activity based on nursing assessment  - Administer anti-seizure medications if ordered    Outcome: Progressing  Goal: Achieves maximal functionality and self care  Description: INTERVENTIONS  - Monitor swallowing and airway patency with patient fatigue and changes in neurological status  - Encourage and assist patient to increase activity and self care     - Encourage visually impaired, hearing impaired and aphasic patients to use assistive/communication devices  Outcome: Progressing     Problem: PAIN - ADULT  Goal: Verbalizes/displays adequate comfort level or baseline comfort level  Description: Interventions:  - Encourage patient to monitor pain and request assistance  - Assess pain using appropriate pain scale  - Administer analgesics based on type and severity of pain and evaluate response  - Implement non-pharmacological measures as appropriate and evaluate response  - Consider cultural and social influences on pain and pain management  - Notify physician/advanced practitioner if interventions unsuccessful or patient reports new pain  Outcome: Progressing     Problem: INFECTION - ADULT  Goal: Absence or prevention of progression during hospitalization  Description: INTERVENTIONS:  - Assess and monitor for signs and symptoms of infection  - Monitor lab/diagnostic results  - Monitor all insertion sites, i e  indwelling lines, tubes, and drains  - Monitor endotracheal if appropriate and nasal secretions for changes in amount and color  - [place] appropriate cooling/warming therapies per order  - Administer medications as ordered  - Instruct and encourage patient and family to use good hand hygiene technique  - Identify and instruct in appropriate isolation precautions for identified infection/condition  Outcome: Progressing     Problem: SAFETY ADULT  Goal: Maintain or return to baseline ADL function  Description: INTERVENTIONS:  -  Assess patient's ability to carry out ADLs; assess patient's baseline for ADL function and identify physical deficits which impact ability to perform ADLs (bathing, care of mouth/teeth, toileting, grooming, dressing, etc )  - Assess/evaluate cause of self-care deficits   - Assess range of motion  - Assess patient's mobility; develop plan if impaired  - Assess patient's need for assistive devices and provide as appropriate  - Encourage maximum independence but intervene and supervise when necessary  - Involve family in performance of ADLs  - Assess for home care needs following discharge   - Consider OT consult to assist with ADL evaluation and planning for discharge  - Provide patient education as appropriate  Outcome: Progressing  Goal: Maintains/Returns to pre admission functional level  Description: INTERVENTIONS:  - Perform BMAT or MOVE assessment daily    - Set and communicate daily mobility goal to care team and patient/family/caregiver  - Collaborate with rehabilitation services on mobility goals if consulted  - Perform Range of Motion  times a day  - Reposition patient every  hours    - Dangle patient  times a day  - Stand patient  times a day  - Ambulate patient 3 times a day  - Out of bed to chair  times a day   - Out of bed for meals  times a day  - Out of bed for toileting  - Record patient progress and toleration of activity level   Outcome: Progressing  Goal: Patient will remain free of falls  Description: INTERVENTIONS:  - Educate patient/family on patient safety including physical limitations  - Instruct patient to call for assistance with activity   - Consult OT/PT to assist with strengthening/mobility   - Keep Call bell within reach  - Keep bed low and locked with side rails adjusted as appropriate  - Keep care items and personal belongings within reach  - Initiate and maintain comfort rounds  - Make Fall Risk Sign visible to staff  - Offer Toileting every 2 Hours, in advance of need  - Initiate/Maintain bed and chair alarm  - Obtain necessary fall risk management equipment:   - Apply yellow socks and bracelet for high fall risk patients  - Consider moving patient to room near nurses station  Outcome: Progressing     Problem: DISCHARGE PLANNING  Goal: Discharge to home or other facility with appropriate resources  Description: INTERVENTIONS:  - Identify barriers to discharge w/patient and caregiver  - Arrange for needed discharge resources and transportation as appropriate  - Identify discharge learning needs (meds, wound care, etc )  - Arrange for interpretive services to assist at discharge as needed  - Refer to Case Management Department for coordinating discharge planning if the patient needs post-hospital services based on physician/advanced practitioner order or complex needs related to functional status, cognitive ability, or social support system  Outcome: Progressing     Problem: Knowledge Deficit  Goal: Patient/family/caregiver demonstrates understanding of disease process, treatment plan, medications, and discharge instructions  Description: Complete learning assessment and assess knowledge base    Interventions:  - Provide teaching at level of understanding  - Provide teaching via preferred learning methods  Outcome: Progressing

## 2021-09-19 NOTE — DISCHARGE INSTRUCTIONS
Effects of a Stroke   WHAT YOU NEED TO KNOW:   What are the effects of a stroke? The effects of a stroke may be different for everyone  They may depend on where the stroke happened in your brain and how much damage occurred there  Some people may make a full recovery  Other people may have long-term effects  It is important to talk to your healthcare provider about any symptoms you have after a stroke  There are medicines and therapies to help manage the effects of a stroke  What should family or support persons know about the effects of a stroke? It is important for family or support persons to know how a stroke has affected your loved one  Know when to call 911, seek immediate care, or call your loved one's healthcare provider  What speech, language, or memory problems can a stroke cause? · Difficulty finding the right words or putting complete sentences together    · Difficulty putting words together that make sense    · Difficulty paying attention or a short attention span    · Difficulty writing or reading    · Problems with memory or being disoriented to time, place, or situation    · Problems thinking clearly or feeling confused    · Difficulty understanding written or spoken language or learning something new  What muscle and nerve problems can a stroke cause? A stroke may affect one side of your body or part of one side  You may be at an increased risk for falling if you have difficulty moving your leg muscles   You may have any of the following:  · Inability to move your arm, leg, or one side of your face (paralysis)    · Muscle weakness, spasms, or muscles that stay in one position (contracted)    · Poor balance, difficulty walking, or difficulty grasping objects    · Changes in your vision or poor vision    · Ignoring, or being unaware of one side of your body    · Numbness of your arm, hand, fingers, leg, foot, or toes    · Pain, tickling, or prickling in weak or paralyzed parts of your body  What bowel and bladder problems can a stroke cause? · Loss of control of your urine or bowel movements    · Feeling like you have to urinate frequently, even when your bladder is not full    · Difficulty emptying your bladder or constipation  What swallowing and eating problems can a stroke cause? · Difficulty swallowing food    · Difficulty feeding yourself    · A lack of taste or a change in the way you taste things    · An increased risk for aspiration (food moves into the lungs) and pneumonia due to swallowing problems  What changes in personality or mood can a stroke cause? · Depression, sadness, irritability, or hopelessness    · Anger, frustration, or anxiety    · Difficulty controlling emotions or expressing inappropriate emotions    · Quick mood changes  What problems with fatigue can a stroke cause? · Low energy levels    · Tiring easily    · A lack of motivation  What sleeping problems can a stroke cause? · Development of sleep apnea    · Changes in sleep such as sleeping too much or not enough  Where can I find support and more information? · National Stroke Association  [address] , [place] [address]  Phone: 6- [phone]  Web Address: [employer] [facility]  What problems with sexual function can a stroke cause? · Difficulty having or keeping an erection    · Vaginal dryness    · A decreased interest in sex  Call 911 or have someone else call for any of the following:   · You have any of the following signs of a stroke:      ¨ Numbness or drooping on one side of your face     ¨ Weakness in an arm or leg    ¨ Confusion or difficulty speaking    ¨ Dizziness, a severe headache, or vision loss         · You cannot be woken up  · You fall and hit your head  · You have a seizure  · You feel lightheaded, short of breath, and have chest pain  · You cough up blood  When should I seek immediate care?    · Your arm or leg is painful, red, or larger than normal  · You feel weak, dizzy, or faint  · You have a fall without hitting your head  · Your blood sugar level or blood pressure is higher or lower than your healthcare provider said it should be  · You bleed heavily or cannot stop bleeding from a cut or injury  · You have a new, severe headache  When should I contact my healthcare provider? · You have a fever  · You have a rash  · You have new or worsening symptoms  · You feel extremely sad or anxious  · You feel you are unable to cope with your condition  · You have trouble sleeping  · You have open sores  · You choke or cough when eating or drinking  · You have questions or concerns about your condition or care  CARE AGREEMENT:   You have the right to help plan your care  Learn about your health condition and how it may be treated  Discuss treatment options with your caregivers to decide what care you want to receive  You always have the right to refuse treatment  The above information is an  only  It is not intended as medical advice for individual conditions or treatments  Talk to your doctor, nurse or pharmacist before following any medical regimen to see if it is safe and effective for you  © 2017 [address]  Information is for End User's use only and may not be sold, redistributed or otherwise used for commercial purposes  All illustrations and images included in CareNotes® are the copyrighted property of [employer]  or [employer]

## 2021-09-19 NOTE — ASSESSMENT & PLAN NOTE
Lab Results   Component Value Date    CHOLESTEROL 121 09/18/2021    TRIG 72 09/18/2021    HDL 41 09/18/2021    LDLCALC 66 09/18/2021      Atorvastatin 40 mg QHS

## 2021-09-19 NOTE — PLAN OF CARE
Problem: MOBILITY - ADULT  Goal: Maintain or return to baseline ADL function  Description: INTERVENTIONS:  -  Assess patient's ability to carry out ADLs; assess patient's baseline for ADL function and identify physical deficits which impact ability to perform ADLs (bathing, care of mouth/teeth, toileting, grooming, dressing, etc )  - Assess/evaluate cause of self-care deficits   - Assess range of motion  - Assess patient's mobility; develop plan if impaired  - Assess patient's need for assistive devices and provide as appropriate  - Encourage maximum independence but intervene and supervise when necessary  - Involve family in performance of ADLs  - Assess for home care needs following discharge   - Consider OT consult to assist with ADL evaluation and planning for discharge  - Provide patient education as appropriate  9/19/2021 1214 by Zeferino Jerome RN  Outcome: Completed  9/19/2021 0910 by Zeferino Jerome RN  Outcome: Progressing  Goal: Maintains/Returns to pre admission functional level  Description: INTERVENTIONS:  - Perform BMAT or MOVE assessment daily    - Set and communicate daily mobility goal to care team and patient/family/caregiver  - Collaborate with rehabilitation services on mobility goals if consulted  - Perform Range of Motion  times a day  - Reposition patient every  hours    - Dangle patient  times a day  - Stand patient  times a day  - Ambulate patient  times a day  - Out of bed to chair  times a day   - Out of bed for meals  times a day  - Out of bed for toileting  - Record patient progress and toleration of activity level   9/19/2021 1214 by Zeferino Jerome RN  Outcome: Completed  9/19/2021 0910 by Zeferino Jerome RN  Outcome: Progressing     Problem: Potential for Falls  Goal: Patient will remain free of falls  Description: INTERVENTIONS:  - Educate patient/family on patient safety including physical limitations  - Instruct patient to call for assistance with activity   - Consult OT/PT to assist with strengthening/mobility   - Keep Call bell within reach  - Keep bed low and locked with side rails adjusted as appropriate  - Keep care items and personal belongings within reach  - Initiate and maintain comfort rounds  - Make Fall Risk Sign visible to staff  - Offer Toileting every  Hours, in advance of need  - Initiate/Maintain alarm  - Obtain necessary fall risk management equipment:   - Apply yellow socks and bracelet for high fall risk patients  - Consider moving patient to room near nurses station  9/19/2021 1214 by Jarett Weiss RN  Outcome: Completed  9/19/2021 0910 by Jarett Weiss RN  Outcome: Progressing     Problem: Nutrition/Hydration-ADULT  Goal: Nutrient/Hydration intake appropriate for improving, restoring or maintaining nutritional needs  Description: Monitor and assess patient's nutrition/hydration status for malnutrition  Collaborate with interdisciplinary team and initiate plan and interventions as ordered  Monitor patient's weight and dietary intake as ordered or per policy  Utilize nutrition screening tool and intervene as necessary  Determine patient's food preferences and provide high-protein, high-caloric foods as appropriate       INTERVENTIONS:  - Monitor oral intake, urinary output, labs, and treatment plans  - Assess nutrition and hydration status and recommend course of action  - Evaluate amount of meals eaten  - Assist patient with eating if necessary   - Allow adequate time for meals  - Recommend/ encourage appropriate diets, oral nutritional supplements, and vitamin/mineral supplements  - Order, calculate, and assess calorie counts as needed  - Recommend, monitor, and adjust tube feedings and TPN/PPN based on assessed needs  - Assess need for intravenous fluids  - Provide specific nutrition/hydration education as appropriate  - Include patient/family/caregiver in decisions related to nutrition  9/19/2021 1214 by Jarett Weiss RN  Outcome: Completed  9/19/2021 0910 by Kia Coleman RN  Outcome: Progressing     Problem: NEUROSENSORY - ADULT  Goal: Achieves stable or improved neurological status  Description: INTERVENTIONS  - Monitor and report changes in neurological status  - Monitor vital signs such as temperature, blood pressure, glucose, and any other labs ordered   - Initiate measures to prevent increased intracranial pressure  - Monitor for seizure activity and implement precautions if appropriate      9/19/2021 1214 by Kia Coleman RN  Outcome: Completed  9/19/2021 0910 by Kia Coleman RN  Outcome: Progressing  Goal: Remains free of injury related to seizures activity  Description: INTERVENTIONS  - Maintain airway, patient safety  and administer oxygen as ordered  - Monitor patient for seizure activity, document and report duration and description of seizure to physician/advanced practitioner  - If seizure occurs,  ensure patient safety during seizure  - Reorient patient post seizure  - Seizure pads on all 4 side rails  - Instruct patient/family to notify RN of any seizure activity including if an aura is experienced  - Instruct patient/family to call for assistance with activity based on nursing assessment  - Administer anti-seizure medications if ordered    9/19/2021 1214 by Kia Coleman RN  Outcome: Completed  9/19/2021 0910 by Kia Coleman RN  Outcome: Progressing  Goal: Achieves maximal functionality and self care  Description: INTERVENTIONS  - Monitor swallowing and airway patency with patient fatigue and changes in neurological status  - Encourage and assist patient to increase activity and self care     - Encourage visually impaired, hearing impaired and aphasic patients to use assistive/communication devices  9/19/2021 1214 by Kia Coleman RN  Outcome: Completed  9/19/2021 0910 by Kia Coleman RN  Outcome: Progressing     Problem: PAIN - ADULT  Goal: Verbalizes/displays adequate comfort level or baseline comfort level  Description: Interventions:  - Encourage patient to monitor pain and request assistance  - Assess pain using appropriate pain scale  - Administer analgesics based on type and severity of pain and evaluate response  - Implement non-pharmacological measures as appropriate and evaluate response  - Consider cultural and social influences on pain and pain management  - Notify physician/advanced practitioner if interventions unsuccessful or patient reports new pain  9/19/2021 1214 by Roberto Carlos Irvin RN  Outcome: Completed  9/19/2021 0910 by Roberto Carlos Irvin RN  Outcome: Progressing     Problem: INFECTION - ADULT  Goal: Absence or prevention of progression during hospitalization  Description: INTERVENTIONS:  - Assess and monitor for signs and symptoms of infection  - Monitor lab/diagnostic results  - Monitor all insertion sites, i e  indwelling lines, tubes, and drains  - Monitor endotracheal if appropriate and nasal secretions for changes in amount and color  - [place] appropriate cooling/warming therapies per order  - Administer medications as ordered  - Instruct and encourage patient and family to use good hand hygiene technique  - Identify and instruct in appropriate isolation precautions for identified infection/condition  9/19/2021 1214 by Roberto Carlos Irvin RN  Outcome: Completed  9/19/2021 0910 by Roberto Carlos Irvin RN  Outcome: Progressing     Problem: SAFETY ADULT  Goal: Maintain or return to baseline ADL function  Description: INTERVENTIONS:  -  Assess patient's ability to carry out ADLs; assess patient's baseline for ADL function and identify physical deficits which impact ability to perform ADLs (bathing, care of mouth/teeth, toileting, grooming, dressing, etc )  - Assess/evaluate cause of self-care deficits   - Assess range of motion  - Assess patient's mobility; develop plan if impaired  - Assess patient's need for assistive devices and provide as appropriate  - Encourage maximum independence but intervene and supervise when necessary  - Involve family in performance of ADLs  - Assess for home care needs following discharge   - Consider OT consult to assist with ADL evaluation and planning for discharge  - Provide patient education as appropriate  9/19/2021 1214 by Doreen Goodman RN  Outcome: Completed  9/19/2021 0910 by Doreen Goodman RN  Outcome: Progressing  Goal: Maintains/Returns to pre admission functional level  Description: INTERVENTIONS:  - Perform BMAT or MOVE assessment daily    - Set and communicate daily mobility goal to care team and patient/family/caregiver  - Collaborate with rehabilitation services on mobility goals if consulted  - Perform Range of Motion  times a day  - Reposition patient every  hours    - Dangle patient  times a day  - Stand patient  times a day  - Ambulate patient  times a day  - Out of bed to chair  times a day   - Out of bed for meals  times a day  - Out of bed for toileting  - Record patient progress and toleration of activity level   9/19/2021 1214 by Doreen Goodman RN  Outcome: Completed  9/19/2021 0910 by Doreen Goodman RN  Outcome: Progressing  Goal: Patient will remain free of falls  Description: INTERVENTIONS:  - Educate patient/family on patient safety including physical limitations  - Instruct patient to call for assistance with activity   - Consult OT/PT to assist with strengthening/mobility   - Keep Call bell within reach  - Keep bed low and locked with side rails adjusted as appropriate  - Keep care items and personal belongings within reach  - Initiate and maintain comfort rounds  - Make Fall Risk Sign visible to staff  - Offer Toileting every  Hours, in advance of need  - Initiate/Maintain alarm  - Obtain necessary fall risk management equipment  - Apply yellow socks and bracelet for high fall risk patients  - Consider moving patient to room near nurses station  9/19/2021 1214 by Doreen Goodman RN  Outcome: Completed  9/19/2021 0910 by Lizbeth Aranda RN  Outcome: Progressing     Problem: DISCHARGE PLANNING  Goal: Discharge to home or other facility with appropriate resources  Description: INTERVENTIONS:  - Identify barriers to discharge w/patient and caregiver  - Arrange for needed discharge resources and transportation as appropriate  - Identify discharge learning needs (meds, wound care, etc )  - Arrange for interpretive services to assist at discharge as needed  - Refer to Case Management Department for coordinating discharge planning if the patient needs post-hospital services based on physician/advanced practitioner order or complex needs related to functional status, cognitive ability, or social support system  9/19/2021 1214 by Lizbeth Aranda RN  Outcome: Completed  9/19/2021 0910 by Lizbeth Aranda RN  Outcome: Progressing     Problem: Knowledge Deficit  Goal: Patient/family/caregiver demonstrates understanding of disease process, treatment plan, medications, and discharge instructions  Description: Complete learning assessment and assess knowledge base    Interventions:  - Provide teaching at level of understanding  - Provide teaching via preferred learning methods  9/19/2021 1214 by Lzibeth Aranda RN  Outcome: Completed  9/19/2021 0910 by Lizbeth Aranda RN  Outcome: Progressing

## 2021-09-20 ENCOUNTER — TRANSITIONAL CARE MANAGEMENT (OUTPATIENT)
Dept: FAMILY MEDICINE CLINIC | Facility: CLINIC | Age: 71
End: 2021-09-20

## 2021-09-20 NOTE — UTILIZATION REVIEW
Inpatient Admission Authorization Request   NOTIFICATION OF INPATIENT ADMISSION/INPATIENT AUTHORIZATION REQUEST   SERVICING FACILITY:   [facility]  Address: [address] [place] [address], [address] [place] [address]  Tax ID: [id number]  NPI: [id number]  Place of Service: Inpatient [address] [facility] Code: 24     ATTENDING PROVIDER:  Attending Name and NPI#: Rosemary Apple, [place] [[id number]]  Address: [address] [place] [address], [address] [place] [address]  Phone: [phone]     UTILIZATION REVIEW CONTACT:  Faustino Kebede Utilization   Network Utilization Review Department  Phone: [phone]  Fax: [phone]  Email: [email or web address]     PHYSICIAN ADVISORY SERVICES:  FOR EBAF-MQ-HFON REVIEW - MEDICAL NECESSITY DENIAL  Phone: [phone]  Fax: [phone]  Email: [email or web address]     TYPE OF REQUEST:  Inpatient Status     ADMISSION INFORMATION:  ADMISSION DATE/TIME: 9/17/21  8:40 PM  PATIENT DIAGNOSIS CODE/DESCRIPTION:  CVA (cerebral vascular accident) ([facility] ) [I63 9]  DISCHARGE DATE/TIME: 9/19/2021  3:20 PM  DISCHARGE DISPOSITION (IF DISCHARGED): Home/Self Care     IMPORTANT INFORMATION:  Please contact the Faustino Kebede directly with any questions or concerns regarding this request  Department voicemails are confidential     Send requests for admission clinical reviews, concurrent reviews, approvals, and administrative denials due to lack of clinical to fax [phone]         Initial Clinical Review    Admission: Date/Time/Statement:   Admission Orders (From admission, onward)     Ordered        09/17/21 2154  Inpatient Admission  Once                   Orders Placed This Encounter   Procedures    Inpatient Admission     Standing Status:   Standing     Number of Occurrences:   1     Order Specific Question:   Level of Care     Answer:   Critical Care [15]     Order Specific Question:   Estimated length of stay     Answer:   More than 2 Midnights     Order Specific Question:   Certification     Answer:   I certify that inpatient services are medically necessary for this patient for a duration of greater than two midnights  See H&P and MD Progress Notes for additional information about the patient's course of treatment  Initial Presentation: 81 y/o female presents to [facility] ED as transfer form [facility] ED as [address]  Pt to [facility] with sudden onset R sided facial, arm and leg numbness and weakness  NIHSS of 3  Last known well at 10:40 am 9/17  Daughter also notes word finding difficulties and unsteady gait  Stroke alert was called  CTH, CTA head/neck no acute abnl  Pt is s/p tPA at 1:30 pm  Tx'd to [facility] for higher level of care with close monitoring  Admitted inpatient to [facility] 2 step down bed -- q1 neuro checks  Repeat CTH at 1:30 pm 9/18, 24 hours after tPA administered  SCD's only for DVT ppx  SBP goal <160  Tylenol prn  Lipid panel and HgbA1C ordered  PT/OT/ST evals  Continue home po meds  Neuro consulted  Neuro consult 9/18 -- Tiny posterior circulation stroke  Start aspirin 325 then 81 mg daily  SBP goal < 130  Suspect vessel to vessel from vertebral osteal disease vs small vessel  Rosuvastatin 40 when discharged  PT/OT/Speech    Date: 9/18  Day 2: Pt continues noting right hand numbness and tingling  Denies any other complaints  Denies any new or worsening headache, nausea, vomiting or light headedness   CTH 24 hours post tPA shows no acute intracranial abnormality and no intracranial hemorrhage status post TPA  Transfer to M/S/Tele unit  Continue current po meds  q4 neuro checks  Reg diet       ED Triage Vitals [09/17/21 2300]   Temperature Pulse Respirations Blood Pressure SpO2   98 2 °F (36 8 °C) 67 18 119/58 93 %      Temp Source Heart Rate Source Patient Position - Orthostatic VS BP Location FiO2 (%)   Oral Monitor Lying Left arm --      Pain Score       No Pain          Wt Readings from Last 1 Encounters:   09/19/21 76 5 kg (168 lb 11 2 oz)     Additional Vital Signs:   Date/Time  Temp  Pulse  Resp  BP  MAP (mmHg)  SpO2  Calculated FIO2 (%) - Nasal Cannula  Nasal Cannula O2 Flow Rate (L/min)  O2 Device   09/18/21 2100  98 2 °F (36 8 °C)  59  --  123/67  86  --  --  --  --   09/18/21 20:58:56  98 2 °F (36 8 °C)  60  18  123/67  86  95 %  --  --  None (Room air)   09/18/21 1800  --  72  --  128/60  86  --  --  --  --   09/18/21 1200  --  72  --  121/59  85  93 %  --  --  --   09/18/21 0900  --  64  18  115/63  77  --  --  --  Nasal cannula   09/18/21 0800  98 9 °F (37 2 °C)  67  --  111/53  77  --  --  --  --   09/18/21 0700  --  57  16  112/56  81  97 %  32  3 L/min  Nasal cannula   09/18/21 0130  --  60  --  118/58  84  96 %  32  3 L/min  Nasal cannula   09/18/21 0000  --  65  --  110/56  78  --  --  --  --   09/17/21 2300  98 2 °F (36 8 °C)  67  18  119/58  83  93 %  --  --  None (Room air)       Pertinent Labs/Diagnostic Test Results:   EKG 9/17 -- Sinus rhythm with Premature atrial complexes    CT head 9/17 -- No acute intracranial abnormality   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 and mild microangiopathic change  CTA head/neck 9/17 - Negative CTA head and neck for large vessel occlusion, dissection, aneurysm, or high-grade stenosis   Mild atherosclerotic disease of the head and neck, as detailed above     CT head 9/18 -- No acute intracranial abnormality   No intracranial hemorrhage status post TPA  MRI brain 9/18 -- Acute, subcentimeter left pontine infarct, vertebral basilar vascular territory  No intracranial hemorrhage status post TPA       Results from last 7 days   Lab Units 09/17/21  1405   SARS-COV-2  Negative     Results from last 7 days   Lab Units 09/18/21  0609 09/17/21  1300   WBC Thousand/uL 5 91 7 80   HEMOGLOBIN g/dL 14 5 14 6   HEMATOCRIT % 43 8 42 6   PLATELETS Thousands/uL 227 229   NEUTROS ABS Thousands/µL 3 77  --      Results from last 7 days   Lab Units 09/18/21  0610 09/17/21  1300   SODIUM mmol/L 139 141   POTASSIUM mmol/L 4 1 4 2   CHLORIDE mmol/L 108 104   CO2 mmol/L 27 28   ANION GAP mmol/L 4 9   BUN mg/dL 15 14   CREATININE mg/dL 0 56* 0 74   EGFR ml/min/1 73sq m 95 82   CALCIUM mg/dL 8 5 9 4   CALCIUM, IONIZED mmol/L 1 14  --    MAGNESIUM mg/dL 2 5  --    PHOSPHORUS mg/dL 4 0  --      Results from last 7 days   Lab Units 09/18/21  0610   AST U/L 18   ALT U/L 19   ALK PHOS U/L 86   TOTAL PROTEIN g/dL 7 4   ALBUMIN g/dL 3 3*   TOTAL BILIRUBIN mg/dL 1 21*     Results from last 7 days   Lab Units 09/17/21  1248   POC GLUCOSE mg/dl 86     Results from last 7 days   Lab Units 09/18/21  0610 09/17/21  1300   GLUCOSE RANDOM mg/dL 95 96       Results from last 7 days   Lab Units 09/18/21  0610   HEMOGLOBIN A1C % 5 6   EAG mg/dl 114     Results from last 7 days   Lab Units 09/17/21  1300   TROPONIN I ng/mL <0 03       Results from last 7 days   Lab Units 09/18/21  0610 09/17/21  1300   PROTIME seconds 14 4 13 6   INR  1 16 1 03   PTT seconds  --  28         Past Medical History:   Diagnosis Date    Cancer ([place] [address] )     gallbladder    COVID-19     GERD (gastroesophageal reflux disease)     Glaucoma     History of gallbladder cancer     Approx  3 years ago    Hyperlipemia     Murmur     Rheumatic fever      Present on Admission:   Acute [facility]A [facility]cerebrovascular accident) ([facility] )   Pure hypertriglyceridemia   GERD (gastroesophageal reflux disease)   Glaucoma   Primary gall bladder adenocarcinoma (HCC)   Headache      Admitting Diagnosis: CVA (cerebral vascular accident) ([facility] ) [I63 9]  Age/Sex: 80 y o  female  Admission Orders:  Scheduled Medications:  No current facility-administered medications for this encounter  No current facility-administered medications for this encounter  PRN Meds:  No current facility-administered medications for this encounter        IP CONSULT TO NEUROLOGY  IP CONSULT TO CASE MANAGEMENT    Network Utilization Review Department  ATTENTION: Please call with any questions or concerns to [phone] and carefully listen to the prompts so that you are directed to the right person  All voicemails are confidential   Maximiliano Watson all requests for admission clinical reviews, approved or denied determinations and any other requests to dedicated fax number below belonging to the campus where the patient is receiving treatment  List of dedicated fax numbers for the Facilities:  [address] DENIALS (Administrative/Medical Necessity) [phone]   [address] (Maternity/NICU/Pediatrics) [phone]   [phone] [address] [facility] [facility] [phone]       Notification of Discharge   This is a Notification of Discharge from our facility [address]  Please be advised that this patient has been discharge from our facility  Below you will find the admission and discharge date and time including the patients disposition  UTILIZATION REVIEW CONTACT:  Van Caraballo  Utilization   Network Utilization Review Department  Phone: [phone] x carefully listen to the prompts  All voicemails are confidential   Email: [email or web address]     PHYSICIAN ADVISORY SERVICES:  FOR FSRO-NE-BEPX REVIEW - MEDICAL NECESSITY DENIAL  Phone: [phone]  Fax: [phone]  Email: [email or web address]     PRESENTATION DATE: 9/17/2021  8:40 PM  OBERVATION ADMISSION DATE:   INPATIENT ADMISSION DATE: 9/17/21  8:40 PM DISCHARGE DATE: 9/19/2021  3:20 PM  DISPOSITION: Home/Self Care Home/Self Care      IMPORTANT INFORMATION:  Send all requests for admission clinical reviews, approved or denied determinations and any other requests to dedicated fax number below belonging to the campus where the patient is receiving treatment   List of dedicated fax numbers:  [address] DENIALS (Administrative/Medical Necessity) [phone]   [address] (Maternity/NICU/Pediatrics) [phone]   Sunshine Goldsmith [phone]   Ashli Samano [phone]   Jessie Mark [phone]   [address] [phone]   [facility]  [phone]   [address] [place] [address], [place] [address] [facility] 1000 [facility] [phone]

## 2021-09-21 ENCOUNTER — OFFICE VISIT (OUTPATIENT)
Dept: FAMILY MEDICINE CLINIC | Facility: CLINIC | Age: 71
End: 2021-09-21
Payer: COMMERCIAL

## 2021-09-21 ENCOUNTER — TELEPHONE (OUTPATIENT)
Dept: NEUROLOGY | Facility: CLINIC | Age: 71
End: 2021-09-21

## 2021-09-21 VITALS
HEIGHT: 63 IN | SYSTOLIC BLOOD PRESSURE: 114 MMHG | BODY MASS INDEX: 29.48 KG/M2 | OXYGEN SATURATION: 98 % | RESPIRATION RATE: 18 BRPM | WEIGHT: 166.4 LBS | TEMPERATURE: 97.5 F | HEART RATE: 55 BPM | DIASTOLIC BLOOD PRESSURE: 62 MMHG

## 2021-09-21 DIAGNOSIS — R51.9 NONINTRACTABLE HEADACHE, UNSPECIFIED CHRONICITY PATTERN, UNSPECIFIED HEADACHE TYPE: ICD-10-CM

## 2021-09-21 DIAGNOSIS — I63.9 STROKE (HCC): ICD-10-CM

## 2021-09-21 DIAGNOSIS — I63.9 ACUTE CVA (CEREBROVASCULAR ACCIDENT) (HCC): Primary | ICD-10-CM

## 2021-09-21 PROCEDURE — 99496 TRANSJ CARE MGMT HIGH F2F 7D: CPT | Performed by: NURSE PRACTITIONER

## 2021-09-21 PROCEDURE — 1111F DSCHRG MED/CURRENT MED MERGE: CPT | Performed by: NURSE PRACTITIONER

## 2021-09-21 RX ORDER — RIBOFLAVIN (VITAMIN B2) 400 MG
400 TABLET ORAL 2 TIMES DAILY
Qty: 180 TABLET | Refills: 0 | Status: SHIPPED | OUTPATIENT
Start: 2021-09-21 | End: 2022-04-05 | Stop reason: SDUPTHER

## 2021-09-21 NOTE — PROGRESS NOTES
[address] [facility]        NAME: Avelina Riggs is a 80 y o  female  : 1950    MRN: [id number]  DATE: 2021  TIME: 1:09 PM    Assessment and Plan   Acute CVA (cerebrovascular accident) ([address] ) [I63 9]  1  Acute CVA (cerebrovascular accident) ([address] )     2  Stroke ([address] )  Riboflavin 400 MG TABS   3  Nonintractable headache, unspecified chronicity pattern, unspecified headache type     1  Stroke ([address] )  - Riboflavin 400 MG TABS; Take 1 tablet (400 mg total) by mouth 2 (two) times a day  Dispense: 180 tablet; Refill: 0    2  Acute CVA (cerebrovascular accident) ([address] )      3  Nonintractable headache, unspecified chronicity pattern, unspecified headache type  Riboflavin for HA prevention        Patient Instructions     There are no Patient Instructions on file for this visit  Chief Complaint     Chief Complaint   Patient presents with    Transition of Care Management         History of Present Illness       Patient here for hospital follow up visit after admission for a CVA  Went to the ED with right face, arm, and leg numbness, word finding difficulties  Avelina Riggs is a 80 y o  female patient who originally presented to the ED at [facility] on 2021 due to sudden onset of left face, arm, and leg tingling, unsteady gait, and difficulties with word finding  LKW at 10:40 AM  Presented approximately 1 5 hours after symptoms started  Stroke alert was called at 12:50 PM  Initial NIHSS of 3  CTH and CTA were negative  Patient was amenable to tPA and received in ED at 1:38 PM  She was transferred to [address] [place] [address] for higher level of care  After tPA, patient reported significant improvement in her symptoms  Continued to have very mild word finding difficulties and difficulty with complex tasks  No focal weakness or sensory changes       Additional workup for stroke was completed at [facility]   MRI was notable for a small stroke in the left fátima consistent with posterior circulation territory  Given these findings, etiology is likely small vessel disease versus vertebral artery disease     Rebecca Jones also complained of a chronic headache during her admission that has been present for over 1 year  Basic labs were obtained  She will follow up as an outpatient  TCM Call (since 8/21/2021)     Date and time call was made  9/20/2021 11:47 AM    Patient was hospitialized at  [facility]        Date of Admission  09/17/21    Date of discharge  09/19/21    Diagnosis  Acute CVA    Disposition  Home    Current Symptoms  None (Comment)  Hand still feels funny      TCM Call (since 8/21/2021)     Post hospital issues  None    Scheduled for follow up? Yes    Did you obtain your prescribed medications  Yes    Do you need help managing your prescriptions or medications  No    Is transportation to your appointment needed  No    I have advised the patient to call PCP with any new or worsening symptoms    Jacklyn [address] [place] [address]    The type of support provided  Emotional; Physical; Other (comment)    Do you have social support  Yes, as much as I need      * Acute CVA (cerebrovascular accident) [facility])  Assessment & Plan  Patient presented w/ acute ischemic stroke  · Presenting sx: dysarthria, potentially disabling  · NIHSS: 3  · tPA: given at 1339 9/17/21  · Mechanical thrombectomy - no LVO target  · Workup  ? CTH/CTA: small old L cerebellar infarct; negative for LVO, dissection, high-grade stenosis, mild intra- and extra-cranial atherosclerosis  ? MRI: acute sub-cm left pontine infarct, in vertebrobasilar territory; no abnormal enhancement; mild to moderate microangiopathic changes  ? Labs: HbA1c 5 6, LDL 66  ? Repeat CTH: no hemorrhagic transformation s/p tPA  ?  Echo: LVEF 60%, trace MR/TR, no clear RWMA  · Exam: minimal sx persist at this time, mild cognitive deficits    Update:Patient is improving as of today's visit, does have some trouble with short term memory and ongoing numbness in the fingertips of her right hand       Plan:  · Patient stable for d/c at this time  · Continue ASA 81 mg daily and Atorvastatin 40 mg QHS  · Will need outpatient MALENA to complete workup- Will call today 9/21 to schedule after appointment  · Follow up with neurovascular team in 4-6 weeks- Will schedule this appointment today as well       Headache  · 5/10, chronic for over 1 yr  · ESR normal, CRP 7 3  · Recommend outpatient follow up  · Can try magnesium oxide and riboflavin for headache prevention     Pure hypertriglyceridemia-Atorvastatin 40 mg QHS     GERD (gastroesophageal reflux disease)-Replaced home med w/ protonix     COPD, group A, by GOLD 2017 classification ([facility] )- Patient has multiple inhalers on home med list but is reportedly not taking these at this time     Primary gall bladder adenocarcinoma (HCC)-No signs of abnormal enhancement on MRI             Significant Findings / Test Results:   · CTH: No acute intracranial abnormality   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 and mild microangiopathic change  · CTA: Negative CTA head and neck for large vessel occlusion, dissection, aneurysm, or high-grade stenosis   Mild atherosclerotic disease of the head and neck, as detailed above  · MRI: Acute, subcentimeter left pontine infarct, vertebral basilar vascular territory  No intracranial hemorrhage status post TPA  Other nonemergent findings above  · Telemetry: no events  · LDL 66, HgbA1c 5 6%  · ESR normal, CRP 7 3           Review of Systems   Review of Systems   Constitutional: Negative for activity change, appetite change, chills, fatigue and fever  HENT: Negative for congestion, ear pain, nosebleeds, rhinorrhea and sore throat  Eyes: Negative for photophobia, pain, redness and visual disturbance  Respiratory: Negative for cough, shortness of breath and wheezing  Cardiovascular: Negative  Negative for chest pain     Gastrointestinal: Negative  Negative for abdominal pain, constipation, diarrhea and vomiting  Endocrine: Negative  Genitourinary: Negative for difficulty urinating, dysuria and flank pain  Musculoskeletal: Negative  Skin: Negative for color change and rash  Neurological: Negative for dizziness, weakness, numbness and headaches  Hematological: Negative for adenopathy  Psychiatric/Behavioral: Positive for confusion (short term memory loss)  Negative for agitation  The patient is not nervous/anxious          PHQ-9 Depression Screening    PHQ-9:   Frequency of the following problems over the past two weeks:            Current Medications       Current Outpatient Medications:     acetaminophen (TYLENOL) 325 mg tablet, Take by mouth as needed , Disp: , Rfl:     aspirin (ECOTRIN LOW STRENGTH) 81 mg EC tablet, Take 1 tablet (81 mg total) by mouth daily, Disp: 90 tablet, Rfl: 0    atorvastatin (LIPITOR) 40 mg tablet, Take 1 tablet (40 mg total) by mouth every evening, Disp: 90 tablet, Rfl: 0    magnesium oxide (MAG-OX) 400 mg, Take 1 tablet (400 mg total) by mouth 2 (two) times a day, Disp: 90 tablet, Rfl: 0    omeprazole (PriLOSEC) 20 mg delayed release capsule, Take 1 capsule (20 mg total) by mouth daily (Patient taking differently: Take 40 mg by mouth daily ), Disp: 180 capsule, Rfl: 3    travoprost (TRAVATAN-Z) 0 004 % ophthalmic solution, 1 drop daily at bedtime, Disp: , Rfl:     albuterol (PROVENTIL HFA,VENTOLIN HFA) 90 mcg/act inhaler, Inhale 2 puffs every 6 (six) hours as needed (Patient not taking: Reported on 9/21/2021), Disp: , Rfl:     mometasone (NASONEX) 50 mcg/act nasal spray, 2 sprays into each nostril daily (Patient not taking: Reported on 5/3/2021), Disp: 17 g, Rfl: 0    Riboflavin 400 MG TABS, Take 1 tablet (400 mg total) by mouth 2 (two) times a day, Disp: 180 tablet, Rfl: 0    Spiriva Respimat 2 5 MCG/ACT AERS inhaler, , Disp: , Rfl:     tiotropium (Spiriva Respimat) 2 5 MCG/ACT AERS inhaler, Inhale daily (Patient not taking: Reported on 9/21/2021), Disp: , Rfl:     Current Allergies     Allergies as of 09/21/2021 - Reviewed 09/21/2021   Allergen Reaction Noted    Ciprofloxacin  02/03/2020    Clindamycin  02/03/2020    Doxycycline  02/03/2020    Fluticasone-salmeterol Other (See Comments) 10/13/2020    Keflex [cephalexin]  02/03/2020    Naproxen Hives and Other (See Comments) 08/03/2017    Nsaids  10/04/2020    Penicillins  10/04/2020    Sulfa antibiotics Hives, Itching, and Swelling 08/03/2017            The following portions of the patient's history were reviewed and updated as appropriate: allergies, current medications, past family history, past medical history, past social history, past surgical history and problem list      Past Medical History:   Diagnosis Date    Cancer ([address] )     gallbladder    COVID-19     GERD (gastroesophageal reflux disease)     Glaucoma     History of gallbladder cancer     Approx  3 years ago    Hyperlipemia     Murmur     Rheumatic fever        Past Surgical History:   Procedure Laterality Date    CHOLECYSTECTOMY      TONSILLECTOMY  CHILDHOOD       Family History   Problem Relation Age of Onset    Heart attack Mother     Kidney disease Father     Breast cancer Sister 77    No Known Problems Daughter     No Known Problems Maternal Grandmother     No Known Problems Paternal Grandmother     No Known Problems Sister     No Known Problems Maternal Aunt     No Known Problems Maternal Aunt     No Known Problems Maternal Aunt     No Known Problems Paternal Aunt     No Known Problems Paternal Aunt          Medications have been verified  Objective   /62 (BP Location: Left arm, Patient Position: Sitting, Cuff Size: Standard)   Pulse 55   Temp 97 5 °F (36 4 °C)   Resp 18   Ht 5' 3" (1 6 m)   Wt 75 5 kg (166 lb 6 4 oz)   SpO2 98%   BMI 29 48 kg/m²        Physical Exam     Physical Exam  Vitals and nursing note reviewed  Constitutional:       General: She is not in acute distress  Appearance: Normal appearance  She is well-developed  She is not ill-appearing  HENT:      Head: Normocephalic and atraumatic  Eyes:      General: Lids are normal    Cardiovascular:      Rate and Rhythm: Normal rate and regular rhythm  Heart sounds: Normal heart sounds, S1 normal and S2 normal  No murmur heard  Pulmonary:      Effort: Pulmonary effort is normal  No respiratory distress  Breath sounds: Normal breath sounds  No decreased breath sounds or wheezing  Musculoskeletal:         General: No tenderness or deformity  Normal range of motion  Skin:     General: Skin is warm  Findings: No erythema or rash  Neurological:      Mental Status: She is alert and oriented to person, place, and time  Psychiatric:         Behavior: Behavior normal  Behavior is cooperative  Thought Content:  Thought content normal

## 2021-09-21 NOTE — TELEPHONE ENCOUNTER
Pt called back stating her  told her she had to schedule  I sched HFU appt 11/5/2021 with Dr Odilia Pina in [place]

## 2021-09-21 NOTE — TELEPHONE ENCOUNTER
Spoke to pt, she isn't interested in scheduling a HFU appt  She did say if she changes her mind she will give me a call  I provided my name and number  [employer]Stroke[employer]    NOTE FROM CHART:  Reason for Consult / Principal Problem: stroke  Colton Jeter will need follow up in in 4 - 6 weeks with neurovascular and headache attending (Dr Toni Leslie)  She will require an outpatient MALENA to complete workup   This was ordered at time of discharge

## 2021-09-27 ENCOUNTER — TELEPHONE (OUTPATIENT)
Dept: FAMILY MEDICINE CLINIC | Facility: CLINIC | Age: 71
End: 2021-09-27

## 2021-09-27 NOTE — TELEPHONE ENCOUNTER
Patient was asking how often she should be taking the B-2 or Riboflavin ( Per chart review one (400 Mg) tab twice daily) , she mentioned she was told that the riboflavin would eat at the other vitamins in her body and maybe her liver she is mainly concerned with her magnesium,     Please advise,

## 2021-09-27 NOTE — TELEPHONE ENCOUNTER
Please advise pt that riboflavin is for headache prevention, as well as magnesium  It is water soluble and therefore any extra is excreted in urine  Will not affect other vitamins or liver  Once or twice daily is fine

## 2021-09-28 ENCOUNTER — TELEPHONE (OUTPATIENT)
Dept: FAMILY MEDICINE CLINIC | Facility: CLINIC | Age: 71
End: 2021-09-28

## 2021-09-28 NOTE — TELEPHONE ENCOUNTER
Received a fax from [address] [place] [address] Ave (health care provider certification of medial leave family member's serious health condition) forms to be fill out for her daughter Pa Mahoney into chart and put in [employer] folder

## 2021-10-04 ENCOUNTER — ANESTHESIA EVENT (OUTPATIENT)
Dept: NON INVASIVE DIAGNOSTICS | Facility: HOSPITAL | Age: 71
End: 2021-10-04

## 2021-10-04 ENCOUNTER — HOSPITAL ENCOUNTER (OUTPATIENT)
Dept: NON INVASIVE DIAGNOSTICS | Facility: HOSPITAL | Age: 71
Discharge: HOME | End: 2021-10-04
Payer: COMMERCIAL

## 2021-10-04 ENCOUNTER — ANESTHESIA (OUTPATIENT)
Dept: NON INVASIVE DIAGNOSTICS | Facility: HOSPITAL | Age: 71
End: 2021-10-04

## 2021-10-04 VITALS
RESPIRATION RATE: 18 BRPM | DIASTOLIC BLOOD PRESSURE: 63 MMHG | HEART RATE: 52 BPM | SYSTOLIC BLOOD PRESSURE: 120 MMHG | OXYGEN SATURATION: 98 %

## 2021-10-04 DIAGNOSIS — I63.9 STROKE (HCC): ICD-10-CM

## 2021-10-04 PROCEDURE — 93312 ECHO TRANSESOPHAGEAL: CPT

## 2021-10-04 PROCEDURE — 93320 DOPPLER ECHO COMPLETE: CPT | Performed by: INTERNAL MEDICINE

## 2021-10-04 PROCEDURE — 93312 ECHO TRANSESOPHAGEAL: CPT | Performed by: INTERNAL MEDICINE

## 2021-10-04 PROCEDURE — 93325 DOPPLER ECHO COLOR FLOW MAPG: CPT | Performed by: INTERNAL MEDICINE

## 2021-10-04 RX ORDER — PROPOFOL 10 MG/ML
INJECTION, EMULSION INTRAVENOUS AS NEEDED
Status: DISCONTINUED | OUTPATIENT
Start: 2021-10-04 | End: 2021-10-04

## 2021-10-04 RX ORDER — SODIUM CHLORIDE 9 MG/ML
INJECTION, SOLUTION INTRAVENOUS CONTINUOUS PRN
Status: DISCONTINUED | OUTPATIENT
Start: 2021-10-04 | End: 2021-10-04

## 2021-10-04 RX ORDER — LIDOCAINE HYDROCHLORIDE 10 MG/ML
INJECTION, SOLUTION EPIDURAL; INFILTRATION; INTRACAUDAL; PERINEURAL AS NEEDED
Status: DISCONTINUED | OUTPATIENT
Start: 2021-10-04 | End: 2021-10-04

## 2021-10-04 RX ORDER — EPHEDRINE SULFATE 50 MG/ML
INJECTION INTRAVENOUS AS NEEDED
Status: DISCONTINUED | OUTPATIENT
Start: 2021-10-04 | End: 2021-10-04

## 2021-10-04 RX ADMIN — LIDOCAINE HYDROCHLORIDE 50 MG: 10 INJECTION, SOLUTION EPIDURAL; INFILTRATION; INTRACAUDAL; PERINEURAL at 09:44

## 2021-10-04 RX ADMIN — EPHEDRINE SULFATE 5 MG: 50 INJECTION, SOLUTION INTRAVENOUS at 09:54

## 2021-10-04 RX ADMIN — PROPOFOL 40 MG: 10 INJECTION, EMULSION INTRAVENOUS at 10:00

## 2021-10-04 RX ADMIN — SODIUM CHLORIDE: 0.9 INJECTION, SOLUTION INTRAVENOUS at 09:25

## 2021-10-04 RX ADMIN — PROPOFOL 70 MG: 10 INJECTION, EMULSION INTRAVENOUS at 09:44

## 2021-10-04 RX ADMIN — PROPOFOL 30 MG: 10 INJECTION, EMULSION INTRAVENOUS at 09:47

## 2021-10-04 RX ADMIN — PROPOFOL 30 MG: 10 INJECTION, EMULSION INTRAVENOUS at 09:54

## 2021-10-04 RX ADMIN — PROPOFOL 30 MG: 10 INJECTION, EMULSION INTRAVENOUS at 09:51

## 2021-10-05 ENCOUNTER — TELEPHONE (OUTPATIENT)
Dept: NON INVASIVE DIAGNOSTICS | Facility: HOSPITAL | Age: 71
End: 2021-10-05

## 2021-10-12 NOTE — TELEPHONE ENCOUNTER
Third message left for [place] regarding paperwork  Will try calling mom this afternoon  Placing forms in PCP folder in the meantime

## 2021-11-05 ENCOUNTER — OFFICE VISIT (OUTPATIENT)
Dept: NEUROLOGY | Facility: CLINIC | Age: 71
End: 2021-11-05
Payer: COMMERCIAL

## 2021-11-05 VITALS
DIASTOLIC BLOOD PRESSURE: 78 MMHG | HEART RATE: 60 BPM | TEMPERATURE: 96.8 F | WEIGHT: 169 LBS | SYSTOLIC BLOOD PRESSURE: 132 MMHG | HEIGHT: 63 IN | BODY MASS INDEX: 29.95 KG/M2

## 2021-11-05 DIAGNOSIS — I63.9 ACUTE CVA (CEREBROVASCULAR ACCIDENT) (HCC): Primary | ICD-10-CM

## 2021-11-05 PROCEDURE — 1036F TOBACCO NON-USER: CPT | Performed by: PSYCHIATRY & NEUROLOGY

## 2021-11-05 PROCEDURE — 3008F BODY MASS INDEX DOCD: CPT | Performed by: PSYCHIATRY & NEUROLOGY

## 2021-11-05 PROCEDURE — 1160F RVW MEDS BY RX/DR IN RCRD: CPT | Performed by: PSYCHIATRY & NEUROLOGY

## 2021-11-05 PROCEDURE — 99213 OFFICE O/P EST LOW 20 MIN: CPT | Performed by: PSYCHIATRY & NEUROLOGY

## 2021-11-15 DIAGNOSIS — I63.9 STROKE (HCC): ICD-10-CM

## 2021-12-07 ENCOUNTER — OFFICE VISIT (OUTPATIENT)
Dept: FAMILY MEDICINE CLINIC | Facility: CLINIC | Age: 71
End: 2021-12-07
Payer: COMMERCIAL

## 2021-12-07 VITALS
HEART RATE: 62 BPM | OXYGEN SATURATION: 99 % | HEIGHT: 63 IN | TEMPERATURE: 98.4 F | DIASTOLIC BLOOD PRESSURE: 70 MMHG | BODY MASS INDEX: 29.41 KG/M2 | SYSTOLIC BLOOD PRESSURE: 102 MMHG | WEIGHT: 166 LBS

## 2021-12-07 DIAGNOSIS — F33.9 DEPRESSION, RECURRENT (HCC): ICD-10-CM

## 2021-12-07 DIAGNOSIS — Z00.00 MEDICARE ANNUAL WELLNESS VISIT, SUBSEQUENT: ICD-10-CM

## 2021-12-07 DIAGNOSIS — Z12.11 COLON CANCER SCREENING: ICD-10-CM

## 2021-12-07 DIAGNOSIS — R41.3 SHORT-TERM MEMORY LOSS: ICD-10-CM

## 2021-12-07 DIAGNOSIS — R20.2 NUMBNESS AND TINGLING IN BOTH HANDS: ICD-10-CM

## 2021-12-07 DIAGNOSIS — R73.01 IMPAIRED FASTING GLUCOSE: ICD-10-CM

## 2021-12-07 DIAGNOSIS — E78.1 PURE HYPERTRIGLYCERIDEMIA: ICD-10-CM

## 2021-12-07 DIAGNOSIS — Z78.0 POST-MENOPAUSAL: ICD-10-CM

## 2021-12-07 DIAGNOSIS — Z12.31 ENCOUNTER FOR SCREENING MAMMOGRAM FOR BREAST CANCER: Primary | ICD-10-CM

## 2021-12-07 DIAGNOSIS — Z79.899 ON STATIN THERAPY: ICD-10-CM

## 2021-12-07 DIAGNOSIS — R20.0 NUMBNESS AND TINGLING IN BOTH HANDS: ICD-10-CM

## 2021-12-07 PROCEDURE — 1125F AMNT PAIN NOTED PAIN PRSNT: CPT | Performed by: NURSE PRACTITIONER

## 2021-12-07 PROCEDURE — 3008F BODY MASS INDEX DOCD: CPT | Performed by: NURSE PRACTITIONER

## 2021-12-07 PROCEDURE — G0439 PPPS, SUBSEQ VISIT: HCPCS | Performed by: NURSE PRACTITIONER

## 2021-12-07 PROCEDURE — 99214 OFFICE O/P EST MOD 30 MIN: CPT | Performed by: NURSE PRACTITIONER

## 2021-12-07 PROCEDURE — 1170F FXNL STATUS ASSESSED: CPT | Performed by: NURSE PRACTITIONER

## 2021-12-07 PROCEDURE — 3288F FALL RISK ASSESSMENT DOCD: CPT | Performed by: NURSE PRACTITIONER

## 2021-12-07 PROCEDURE — 1036F TOBACCO NON-USER: CPT | Performed by: NURSE PRACTITIONER

## 2021-12-07 PROCEDURE — 3725F SCREEN DEPRESSION PERFORMED: CPT | Performed by: NURSE PRACTITIONER

## 2021-12-07 PROCEDURE — 1160F RVW MEDS BY RX/DR IN RCRD: CPT | Performed by: NURSE PRACTITIONER

## 2021-12-07 RX ORDER — LATANOPROST 50 UG/ML
SOLUTION/ DROPS OPHTHALMIC
COMMUNITY
Start: 2021-11-12 | End: 2022-03-10

## 2021-12-07 RX ORDER — LATANOPROST 50 UG/ML
SOLUTION/ DROPS OPHTHALMIC
COMMUNITY

## 2021-12-13 ENCOUNTER — HOSPITAL ENCOUNTER (OUTPATIENT)
Dept: MAMMOGRAPHY | Facility: HOSPITAL | Age: 71
Discharge: HOME | End: 2021-12-13
Payer: COMMERCIAL

## 2021-12-13 ENCOUNTER — HOSPITAL ENCOUNTER (OUTPATIENT)
Dept: BONE DENSITY | Facility: HOSPITAL | Age: 71
Discharge: HOME | End: 2021-12-13
Payer: COMMERCIAL

## 2021-12-13 VITALS — WEIGHT: 166 LBS | HEIGHT: 63 IN | BODY MASS INDEX: 29.41 KG/M2

## 2021-12-13 DIAGNOSIS — Z78.0 POST-MENOPAUSAL: ICD-10-CM

## 2021-12-13 DIAGNOSIS — Z12.31 ENCOUNTER FOR SCREENING MAMMOGRAM FOR BREAST CANCER: ICD-10-CM

## 2021-12-13 PROCEDURE — 77063 BREAST TOMOSYNTHESIS BI: CPT

## 2021-12-13 PROCEDURE — 77080 DXA BONE DENSITY AXIAL: CPT

## 2021-12-13 PROCEDURE — 77067 SCR MAMMO BI INCL CAD: CPT

## 2021-12-23 LAB — COLOGUARD RESULT REPORTABLE: NEGATIVE

## 2021-12-29 DIAGNOSIS — K21.9 GASTROESOPHAGEAL REFLUX DISEASE WITHOUT ESOPHAGITIS: Primary | ICD-10-CM

## 2021-12-29 DIAGNOSIS — I63.9 STROKE (HCC): ICD-10-CM

## 2021-12-29 RX ORDER — OMEPRAZOLE 20 MG/1
20 CAPSULE, DELAYED RELEASE ORAL
Qty: 90 CAPSULE | Refills: 3 | Status: SHIPPED | OUTPATIENT
Start: 2021-12-29

## 2021-12-29 RX ORDER — ATORVASTATIN CALCIUM 40 MG/1
40 TABLET, FILM COATED ORAL EVERY EVENING
Qty: 90 TABLET | Refills: 0 | Status: SHIPPED | OUTPATIENT
Start: 2021-12-29 | End: 2022-03-28 | Stop reason: SDUPTHER

## 2022-03-03 ENCOUNTER — RA CDI HCC (OUTPATIENT)
Dept: OTHER | Facility: HOSPITAL | Age: 72
End: 2022-03-03

## 2022-03-03 NOTE — PROGRESS NOTES
[facility]  coding opportunities       Chart reviewed, no opportunity found: CHART REVIEWED, NO OPPORTUNITY FOUND                        Patients insurance company: [employer]

## 2022-03-10 ENCOUNTER — OFFICE VISIT (OUTPATIENT)
Dept: FAMILY MEDICINE CLINIC | Facility: CLINIC | Age: 72
End: 2022-03-10
Payer: COMMERCIAL

## 2022-03-10 VITALS
TEMPERATURE: 98.2 F | HEART RATE: 65 BPM | SYSTOLIC BLOOD PRESSURE: 112 MMHG | WEIGHT: 173 LBS | HEIGHT: 63 IN | DIASTOLIC BLOOD PRESSURE: 60 MMHG | BODY MASS INDEX: 30.65 KG/M2 | OXYGEN SATURATION: 98 %

## 2022-03-10 DIAGNOSIS — K21.9 GASTROESOPHAGEAL REFLUX DISEASE WITHOUT ESOPHAGITIS: ICD-10-CM

## 2022-03-10 DIAGNOSIS — E78.1 PURE HYPERTRIGLYCERIDEMIA: ICD-10-CM

## 2022-03-10 DIAGNOSIS — I69.30 HISTORY OF CVA WITH RESIDUAL DEFICIT: ICD-10-CM

## 2022-03-10 DIAGNOSIS — J44.9 COPD, GROUP A, BY GOLD 2017 CLASSIFICATION (HCC): Primary | ICD-10-CM

## 2022-03-10 DIAGNOSIS — C23 PRIMARY GALL BLADDER ADENOCARCINOMA (HCC): ICD-10-CM

## 2022-03-10 DIAGNOSIS — R06.00 DOE (DYSPNEA ON EXERTION): ICD-10-CM

## 2022-03-10 PROBLEM — R10.11 RUQ ABDOMINAL PAIN: Status: RESOLVED | Noted: 2021-05-03 | Resolved: 2022-03-10

## 2022-03-10 PROBLEM — R42 LIGHTHEADEDNESS: Status: RESOLVED | Noted: 2021-05-03 | Resolved: 2022-03-10

## 2022-03-10 PROCEDURE — 3725F SCREEN DEPRESSION PERFORMED: CPT | Performed by: NURSE PRACTITIONER

## 2022-03-10 PROCEDURE — 99214 OFFICE O/P EST MOD 30 MIN: CPT | Performed by: NURSE PRACTITIONER

## 2022-03-10 PROCEDURE — 3008F BODY MASS INDEX DOCD: CPT | Performed by: NURSE PRACTITIONER

## 2022-03-10 PROCEDURE — 1160F RVW MEDS BY RX/DR IN RCRD: CPT | Performed by: NURSE PRACTITIONER

## 2022-03-10 PROCEDURE — 1036F TOBACCO NON-USER: CPT | Performed by: NURSE PRACTITIONER

## 2022-03-10 NOTE — PROGRESS NOTES
[address] [facility]        NAME: Annetta Weinstein is a 80 y o  female  : 1950    MRN: [id number]  DATE: March 10, 2022  TIME: 11:35 AM    Assessment and Plan   COPD, group A, by GOLD 2017 classification ([facility] ) [J44 9]  1  COPD, group A, by GOLD 2017 classification ([facility] )     2  History of CVA with residual deficit     3  SOLORZANO (dyspnea on exertion)     4  Pure hypertriglyceridemia     5  Gastroesophageal reflux disease without esophagitis  CBC and differential   6  Primary gall bladder adenocarcinoma ([facility] )     1  COPD, group A, by GOLD 2017 classification ([facility] )  - recommend restarting inhalers with worsening SOB  2  History of CVA with residual deficit  Still has ongoing memory issues  3  SOLORZANO (dyspnea on exertion)  Continues to have SOB, does have COPD      4  Pure hypertriglyceridemia  Continue to monitor with labs  5  Gastroesophageal reflux disease without esophagitis    - CBC and differential; Future    6  Primary gall bladder adenocarcinoma ([facility] )  History of gallbladder cancer in 2017 found during cholecystectomy  Patient Instructions     Patient Instructions   Check to see if you have the Spiriva inhaler at home, if you do I want you to start using 2 times a day  Use albuterol inhaler as needed  Chief Complaint     Chief Complaint   Patient presents with    Follow-up     f/u labs and HLD  History of Present Illness       Patient here for routine follow up visit  History of CVA 2021,  Has continues with memory trouble and numbness in 1-3 on both hands  HTN- is well controlled  Continues with Headaches, taking b2 to help with no relief  COPD-  Has not used any inhalers in months  Feeling SOB with ambulation  Having sleep trouble- doesn't like to  Take medications  Review of Systems   Review of Systems   Constitutional: Negative  Negative for fatigue and fever  Respiratory: Negative    Negative for shortness of breath and wheezing  Cardiovascular: Negative  Negative for chest pain and palpitations  Musculoskeletal: Negative  Skin: Negative  Psychiatric/Behavioral: Negative for decreased concentration, dysphoric mood and sleep disturbance  The patient is not nervous/anxious  PHQ-2/9 Depression Screening    Little interest or pleasure in doing things: 0 - not at all  Feeling down, depressed, or hopeless: 0 - not at all  Trouble falling or staying asleep, or sleeping too much: 3 - nearly every day  Feeling tired or having little energy: 0 - not at all  Poor appetite or overeatin - not at all  Feeling bad about yourself - or that you are a failure or have let yourself or your family down: 0 - not at all  Trouble concentrating on things, such as reading the newspaper or watching television: 0 - not at all  Moving or speaking so slowly that other people could have noticed   Or the opposite - being so fidgety or restless that you have been moving around a lot more than usual: 0 - not at all  Thoughts that you would be better off dead, or of hurting yourself in some way: 0 - not at all  PHQ-9 Score: 3   PHQ-9 Interpretation: No or Minimal depression         Current Medications       Current Outpatient Medications:     aspirin (ECOTRIN LOW STRENGTH) 81 mg EC tablet, Take 1 tablet (81 mg total) by mouth daily, Disp: 90 tablet, Rfl: 0    atorvastatin (LIPITOR) 40 mg tablet, Take 1 tablet (40 mg total) by mouth every evening, Disp: 90 tablet, Rfl: 0    latanoprost (XALATAN) 0 005 % ophthalmic solution, Apply to eye, Disp: , Rfl:     magnesium oxide (MAG-OX) 400 mg, Take 1 tablet (400 mg total) by mouth 2 (two) times a day, Disp: 180 tablet, Rfl: 1    omeprazole (PriLOSEC) 20 mg delayed release capsule, Take 1 capsule (20 mg total) by mouth daily before breakfast, Disp: 90 capsule, Rfl: 3    albuterol (PROVENTIL HFA,VENTOLIN HFA) 90 mcg/act inhaler, Inhale 2 puffs every 6 (six) hours as needed (Patient not taking: Reported on 9/21/2021), Disp: , Rfl:     Riboflavin 400 MG TABS, Take 1 tablet (400 mg total) by mouth 2 (two) times a day (Patient not taking: Reported on 12/7/2021 ), Disp: 180 tablet, Rfl: 0    Spiriva Respimat 2 5 MCG/ACT AERS inhaler, , Disp: , Rfl:     Current Allergies     Allergies as of 03/10/2022 - Reviewed 03/10/2022   Allergen Reaction Noted    Ciprofloxacin  02/03/2020    Clindamycin  02/03/2020    Doxycycline  02/03/2020    Fluticasone-salmeterol Other (See Comments) 10/13/2020    Keflex [cephalexin]  02/03/2020    Naproxen Hives and Other (See Comments) 08/03/2017    Nsaids  10/04/2020    Penicillins  10/04/2020    Sulfa antibiotics Hives, Itching, and Swelling 08/03/2017            The following portions of the patient's history were reviewed and updated as appropriate: allergies, current medications, past family history, past medical history, past social history, past surgical history and problem list      Past Medical History:   Diagnosis Date    Cancer ([address] )     gallbladder    COVID-19     GERD (gastroesophageal reflux disease)     Glaucoma     History of gallbladder cancer     Approx  3 years ago    Hyperlipemia     Lightheadedness 5/3/2021    Murmur     Rheumatic fever     RUQ abdominal pain 5/3/2021       Past Surgical History:   Procedure Laterality Date    CHOLECYSTECTOMY      TONSILLECTOMY  CHILDHOOD       Family History   Problem Relation Age of Onset    Heart attack Mother     Kidney disease Father     Breast cancer Sister 77    No Known Problems Daughter     No Known Problems Maternal Grandmother     No Known Problems Paternal Grandmother     No Known Problems Sister     No Known Problems Maternal Aunt     No Known Problems Maternal Aunt     No Known Problems Maternal Aunt     No Known Problems Paternal Aunt     No Known Problems Paternal Aunt          Medications have been verified          Objective   /60   Pulse 65   Temp 98 2 °F (36 8 °C) Ht 5' 3" (1 6 m)   Wt 78 5 kg (173 lb)   SpO2 98%   BMI 30 65 kg/m²        Physical Exam     Physical Exam  Vitals and nursing note reviewed  Constitutional:       General: She is not in acute distress  Appearance: Normal appearance  She is well-developed  She is not ill-appearing  HENT:      Head: Normocephalic and atraumatic  Eyes:      General: Lids are normal    Cardiovascular:      Rate and Rhythm: Normal rate and regular rhythm  Heart sounds: Normal heart sounds, S1 normal and S2 normal  No murmur heard  Pulmonary:      Effort: Pulmonary effort is normal  No respiratory distress  Breath sounds: Normal breath sounds  No decreased breath sounds, wheezing or rhonchi  Musculoskeletal:         General: No tenderness or deformity  Normal range of motion  Skin:     General: Skin is warm  Findings: No erythema or rash  Neurological:      Mental Status: She is alert and oriented to person, place, and time  Psychiatric:         Behavior: Behavior normal  Behavior is cooperative  Thought Content:  Thought content normal

## 2022-03-10 NOTE — PATIENT INSTRUCTIONS
Check to see if you have the Spiriva inhaler at home, if you do I want you to start using 2 times a day  Use albuterol inhaler as needed

## 2022-03-28 DIAGNOSIS — I63.9 STROKE (HCC): ICD-10-CM

## 2022-03-28 NOTE — TELEPHONE ENCOUNTER
Patient requesting refill(s) of: atorvastatin 40 mg daily    Last filled: 12/29/2021 #90 x 0  Last appt:3/10/2022  Next appt:7/21/2022  [facility]

## 2022-03-29 RX ORDER — ATORVASTATIN CALCIUM 40 MG/1
40 TABLET, FILM COATED ORAL EVERY EVENING
Qty: 90 TABLET | Refills: 0 | Status: SHIPPED | OUTPATIENT
Start: 2022-03-29 | End: 2022-07-11

## 2022-04-05 DIAGNOSIS — I63.9 STROKE (HCC): ICD-10-CM

## 2022-04-05 NOTE — TELEPHONE ENCOUNTER
Patient requesting refill(s) of: Riboflavin 400 mg BID    Last filled: 9/21/2021 #180 x 0  Last appt: 3/10/2022  Next appt:7/21/2022  Pharmacy: [facility]

## 2022-04-05 NOTE — TELEPHONE ENCOUNTER
Rashad Espinosa called and needs a script for Riboflavin B2 400 mg takes 1 daily  Florian Dates never prescribed before      She was taking over the counter 100 mg, but [facility] is now closed and [facility] told her to have her PCP send a script     please advise    Phone: [phone]

## 2022-04-06 RX ORDER — RIBOFLAVIN (VITAMIN B2) 400 MG
400 TABLET ORAL 2 TIMES DAILY
Qty: 180 TABLET | Refills: 0 | Status: SHIPPED | OUTPATIENT
Start: 2022-04-06 | End: 2022-08-01 | Stop reason: SDUPTHER

## 2022-05-05 ENCOUNTER — OFFICE VISIT (OUTPATIENT)
Dept: FAMILY MEDICINE CLINIC | Facility: CLINIC | Age: 72
End: 2022-05-05
Payer: COMMERCIAL

## 2022-05-05 VITALS
DIASTOLIC BLOOD PRESSURE: 80 MMHG | BODY MASS INDEX: 30.16 KG/M2 | SYSTOLIC BLOOD PRESSURE: 130 MMHG | WEIGHT: 170.2 LBS | HEART RATE: 60 BPM | TEMPERATURE: 96.4 F | RESPIRATION RATE: 18 BRPM | HEIGHT: 63 IN | OXYGEN SATURATION: 98 %

## 2022-05-05 DIAGNOSIS — R11.2 NAUSEA AND VOMITING, UNSPECIFIED VOMITING TYPE: ICD-10-CM

## 2022-05-05 DIAGNOSIS — H81.10 BENIGN PAROXYSMAL POSITIONAL VERTIGO, UNSPECIFIED LATERALITY: Primary | ICD-10-CM

## 2022-05-05 DIAGNOSIS — K30 INDIGESTION: ICD-10-CM

## 2022-05-05 PROCEDURE — 3008F BODY MASS INDEX DOCD: CPT | Performed by: NURSE PRACTITIONER

## 2022-05-05 PROCEDURE — 1160F RVW MEDS BY RX/DR IN RCRD: CPT | Performed by: NURSE PRACTITIONER

## 2022-05-05 PROCEDURE — 1036F TOBACCO NON-USER: CPT | Performed by: NURSE PRACTITIONER

## 2022-05-05 PROCEDURE — 99213 OFFICE O/P EST LOW 20 MIN: CPT | Performed by: NURSE PRACTITIONER

## 2022-05-05 RX ORDER — PREDNISONE 20 MG/1
TABLET ORAL
Qty: 9 TABLET | Refills: 0 | Status: SHIPPED | OUTPATIENT
Start: 2022-05-05 | End: 2022-05-11

## 2022-05-05 RX ORDER — MECLIZINE HYDROCHLORIDE 25 MG/1
25 TABLET ORAL 3 TIMES DAILY PRN
Qty: 30 TABLET | Refills: 0 | Status: SHIPPED | OUTPATIENT
Start: 2022-05-05

## 2022-05-05 NOTE — PROGRESS NOTES
[address] [facility]        NAME: Tomer Min is a 71 y o  female  : 1950    MRN: [id number]  DATE: May 5, 2022  TIME: 12:41 PM    Assessment and Plan   Benign paroxysmal positional vertigo, unspecified laterality [H81 10]  1  Benign paroxysmal positional vertigo, unspecified laterality  meclizine (ANTIVERT) 25 mg tablet    predniSONE 20 mg tablet   2  Nausea and vomiting, unspecified vomiting type     3  Indigestion       1  Benign paroxysmal positional vertigo, unspecified laterality  Suspect positional vertigo as dizziness occurs with movement, especially looking down  - meclizine (ANTIVERT) 25 mg tablet; Take 1 tablet (25 mg total) by mouth 3 (three) times a day as needed for dizziness  Dispense: 30 tablet; Refill: 0  - predniSONE 20 mg tablet; Take 1 tablet (20 mg total) by mouth 2 (two) times a day with meals for 3 days, THEN 1 tablet (20 mg total) daily for 3 days  Dispense: 9 tablet; Refill: 0    2  Nausea and vomiting, unspecified vomiting type      3  Indigestion        Patient Instructions     There are no Patient Instructions on file for this visit  Chief Complaint     Chief Complaint   Patient presents with    Dizziness     2 days, feels like she is going to vomit  vomited bile today x1  started in the shower         History of Present Illness       Patient here with c/o dizziness starting yesterday morning, nausea  Reports vomiting bile x 1 episode today  Then after vomiting has been having chest tightness up into shoulder neck area  Reports that when she opened her eyes she felt like it was pages turing  Reports numbness in both of her hands  Also having indigestion, feels like she is full/ bloated but cannot burp  History of stroke within the last year  Denies any numbness, tingling, CP, SOB  Review of Systems   Review of Systems   Constitutional: Negative for activity change, appetite change, chills and fever     HENT: Negative for congestion, ear pain, nosebleeds, rhinorrhea and sore throat  Eyes: Negative for photophobia, pain, redness and visual disturbance  Respiratory: Negative for cough, shortness of breath and wheezing  Cardiovascular: Negative  Negative for chest pain  Gastrointestinal: Positive for vomiting  Negative for abdominal pain, constipation and diarrhea  Endocrine: Negative  Genitourinary: Negative for difficulty urinating, dysuria and flank pain  Musculoskeletal: Negative  Skin: Negative for color change and rash  Neurological: Positive for dizziness  Negative for weakness, numbness and headaches  Hematological: Negative for adenopathy  Psychiatric/Behavioral: Negative for agitation and confusion  The patient is not nervous/anxious          PHQ-2/9 Depression Screening          Current Medications       Current Outpatient Medications:     aspirin (ECOTRIN LOW STRENGTH) 81 mg EC tablet, Take 1 tablet (81 mg total) by mouth daily, Disp: 90 tablet, Rfl: 0    atorvastatin (LIPITOR) 40 mg tablet, Take 1 tablet (40 mg total) by mouth every evening, Disp: 90 tablet, Rfl: 0    latanoprost (XALATAN) 0 005 % ophthalmic solution, Apply to eye, Disp: , Rfl:     magnesium oxide (MAG-OX) 400 mg, Take 1 tablet (400 mg total) by mouth 2 (two) times a day, Disp: 180 tablet, Rfl: 1    omeprazole (PriLOSEC) 20 mg delayed release capsule, Take 1 capsule (20 mg total) by mouth daily before breakfast, Disp: 90 capsule, Rfl: 3    Riboflavin 400 MG TABS, Take 1 tablet (400 mg total) by mouth 2 (two) times a day, Disp: 180 tablet, Rfl: 0    albuterol (PROVENTIL HFA,VENTOLIN HFA) 90 mcg/act inhaler, Inhale 2 puffs every 6 (six) hours as needed (Patient not taking: Reported on 9/21/2021), Disp: , Rfl:     meclizine (ANTIVERT) 25 mg tablet, Take 1 tablet (25 mg total) by mouth 3 (three) times a day as needed for dizziness, Disp: 30 tablet, Rfl: 0    predniSONE 20 mg tablet, Take 1 tablet (20 mg total) by mouth 2 (two) times a day with meals for 3 days, THEN 1 tablet (20 mg total) daily for 3 days  , Disp: 9 tablet, Rfl: 0    Spiriva Respimat 2 5 MCG/ACT AERS inhaler, , Disp: , Rfl:     Current Allergies     Allergies as of 05/05/2022 - Reviewed 05/05/2022   Allergen Reaction Noted    Ciprofloxacin  02/03/2020    Clindamycin  02/03/2020    Doxycycline  02/03/2020    Fluticasone-salmeterol Other (See Comments) 10/13/2020    Keflex [cephalexin]  02/03/2020    Naproxen Hives and Other (See Comments) 08/03/2017    Nsaids  10/04/2020    Penicillins  10/04/2020    Sulfa antibiotics Hives, Itching, and Swelling 08/03/2017            The following portions of the patient's history were reviewed and updated as appropriate: allergies, current medications, past family history, past medical history, past social history, past surgical history and problem list      Past Medical History:   Diagnosis Date    Cancer ([facility] [address] )     gallbladder    COVID-19     GERD (gastroesophageal reflux disease)     Glaucoma     History of gallbladder cancer     Approx  3 years ago    Hyperlipemia     Lightheadedness 5/3/2021    Murmur     Rheumatic fever     RUQ abdominal pain 5/3/2021       Past Surgical History:   Procedure Laterality Date    CHOLECYSTECTOMY      TONSILLECTOMY  CHILDHOOD       Family History   Problem Relation Age of Onset    Heart attack Mother     Kidney disease Father     Breast cancer Sister 77    No Known Problems Daughter     No Known Problems Maternal Grandmother     No Known Problems Paternal Grandmother     No Known Problems Sister     No Known Problems Maternal Aunt     No Known Problems Maternal Aunt     No Known Problems Maternal Aunt     No Known Problems Paternal Aunt     No Known Problems Paternal Aunt          Medications have been verified          Objective   /80   Pulse 60   Temp (!) 96 4 °F (35 8 °C)   Resp 18   Ht 5' 3" (1 6 m)   Wt 77 2 kg (170 lb 3 2 oz)   SpO2 98%   BMI 30 15 kg/m²        Physical Exam     Physical Exam  Vitals and nursing note reviewed  Constitutional:       General: She is not in acute distress  Appearance: Normal appearance  She is well-developed  She is not ill-appearing  HENT:      Head: Normocephalic and atraumatic  Eyes:      General: Lids are normal    Cardiovascular:      Rate and Rhythm: Normal rate and regular rhythm  Heart sounds: Normal heart sounds, S1 normal and S2 normal  No murmur heard  Pulmonary:      Effort: Pulmonary effort is normal  No respiratory distress  Breath sounds: Normal breath sounds  No decreased breath sounds or wheezing  Musculoskeletal:         General: No tenderness or deformity  Normal range of motion  Skin:     General: Skin is warm  Findings: No erythema or rash  Neurological:      General: No focal deficit present  Mental Status: She is alert and oriented to person, place, and time  Cranial Nerves: Cranial nerves are intact  Psychiatric:         Behavior: Behavior normal  Behavior is cooperative  Thought Content:  Thought content normal

## 2022-06-20 DIAGNOSIS — I63.9 STROKE (HCC): ICD-10-CM

## 2022-06-20 NOTE — TELEPHONE ENCOUNTER
Patient requesting refill(s) of: Magnesium oxide    Last filled: 11/16/2021 180 tabs 1 refill   Last appt: 5/5/22  Next appt: 7/21/22  [facility]

## 2022-07-11 DIAGNOSIS — I63.9 STROKE (HCC): ICD-10-CM

## 2022-07-11 RX ORDER — ATORVASTATIN CALCIUM 40 MG/1
TABLET, FILM COATED ORAL
Qty: 90 TABLET | Refills: 0 | Status: SHIPPED | OUTPATIENT
Start: 2022-07-11 | End: 2022-10-26 | Stop reason: SINTOL

## 2022-07-11 NOTE — TELEPHONE ENCOUNTER
Patient requesting refill(s) of: Atorvastatin 40mg    Last filled: 3/29/22 #90 x0  Last appt: 5/5/22  Next appt: 7/21/22  Pharmacy: [facility]

## 2022-07-22 ENCOUNTER — OFFICE VISIT (OUTPATIENT)
Dept: FAMILY MEDICINE CLINIC | Facility: CLINIC | Age: 72
End: 2022-07-22
Payer: COMMERCIAL

## 2022-07-22 VITALS
HEIGHT: 63 IN | DIASTOLIC BLOOD PRESSURE: 62 MMHG | WEIGHT: 168 LBS | BODY MASS INDEX: 29.77 KG/M2 | OXYGEN SATURATION: 98 % | SYSTOLIC BLOOD PRESSURE: 92 MMHG | HEART RATE: 72 BPM | TEMPERATURE: 98.9 F

## 2022-07-22 DIAGNOSIS — N39.0 URINARY TRACT INFECTION WITH HEMATURIA, SITE UNSPECIFIED: ICD-10-CM

## 2022-07-22 DIAGNOSIS — R73.03 PREDIABETES: ICD-10-CM

## 2022-07-22 DIAGNOSIS — R31.9 URINARY TRACT INFECTION WITH HEMATURIA, SITE UNSPECIFIED: ICD-10-CM

## 2022-07-22 DIAGNOSIS — F33.9 DEPRESSION, RECURRENT (HCC): ICD-10-CM

## 2022-07-22 DIAGNOSIS — R30.0 BURNING WITH URINATION: ICD-10-CM

## 2022-07-22 DIAGNOSIS — R30.0 BURNING WITH URINATION: Primary | ICD-10-CM

## 2022-07-22 LAB
SL AMB  POCT GLUCOSE, UA: NEGATIVE
SL AMB LEUKOCYTE ESTERASE,UA: ABNORMAL
SL AMB POCT BILIRUBIN,UA: ABNORMAL
SL AMB POCT BLOOD,UA: ABNORMAL
SL AMB POCT CLARITY,UA: ABNORMAL
SL AMB POCT COLOR,UA: ABNORMAL
SL AMB POCT KETONES,UA: NEGATIVE
SL AMB POCT NITRITE,UA: NEGATIVE
SL AMB POCT PH,UA: 5.5
SL AMB POCT SPECIFIC GRAVITY,UA: >=1.03
SL AMB POCT URINE PROTEIN: >=300
SL AMB POCT UROBILINOGEN: 0.2

## 2022-07-22 PROCEDURE — 87086 URINE CULTURE/COLONY COUNT: CPT | Performed by: NURSE PRACTITIONER

## 2022-07-22 PROCEDURE — 81002 URINALYSIS NONAUTO W/O SCOPE: CPT | Performed by: NURSE PRACTITIONER

## 2022-07-22 PROCEDURE — 99213 OFFICE O/P EST LOW 20 MIN: CPT | Performed by: NURSE PRACTITIONER

## 2022-07-22 RX ORDER — PHENAZOPYRIDINE HYDROCHLORIDE 200 MG/1
200 TABLET, FILM COATED ORAL
Qty: 10 TABLET | Refills: 0 | Status: SHIPPED | OUTPATIENT
Start: 2022-07-22 | End: 2022-10-26

## 2022-07-22 RX ORDER — NITROFURANTOIN 25; 75 MG/1; MG/1
100 CAPSULE ORAL 2 TIMES DAILY
Qty: 10 CAPSULE | Refills: 0 | Status: SHIPPED | OUTPATIENT
Start: 2022-07-22 | End: 2022-07-27

## 2022-07-22 RX ORDER — LANOLIN ALCOHOL/MO/W.PET/CERES
400 CREAM (GRAM) TOPICAL 2 TIMES DAILY
COMMUNITY
Start: 2022-06-20 | End: 2022-09-21 | Stop reason: SDUPTHER

## 2022-07-22 NOTE — PROGRESS NOTES
[address] [place] [address] Primary Care        NAME: Marsh Goltz is a 71 y o  female  : 1950    MRN: [id number]  DATE: 2022  TIME: 2:31 PM    Assessment and Plan   Burning with urination [R30 0]  1  Burning with urination  POCT urine dip    phenazopyridine (PYRIDIUM) 200 mg tablet   2  Urinary tract infection with hematuria, site unspecified  nitrofurantoin (MACROBID) 100 mg capsule    phenazopyridine (PYRIDIUM) 200 mg tablet   3  Depression, recurrent ([address] )     4  Prediabetes  HEMOGLOBIN A1C W/ EAG ESTIMATION    Comprehensive metabolic panel     1  Burning with urination    - POCT urine dip    2  Urinary tract infection with hematuria, site unspecified    - nitrofurantoin (MACROBID) 100 mg capsule; Take 1 capsule (100 mg total) by mouth 2 (two) times a day for 5 days  Dispense: 10 capsule; Refill: 0    3  Depression, recurrent ([address] )  - recently lost  to cancer, having a lot of stress with this  Patient Instructions     There are no Patient Instructions on file for this visit  Chief Complaint     Chief Complaint   Patient presents with    Urinary Tract Infection     Patient feels she may have UTI  Patient have extreme burning after urinating   Stress     Patient's significant other passed away yesterday          History of Present Illness       Patient here for an acute visit with c/o UTI symptoms  Started this morning with burning,does not want to pee because of the pain, urgency but this is normal for her  Denies fever, chills, sweats  Review of Systems   Review of Systems   Constitutional: Negative  Negative for fatigue and fever  Respiratory: Negative  Negative for shortness of breath and wheezing  Cardiovascular: Negative  Negative for chest pain and palpitations  Gastrointestinal: Negative  Musculoskeletal: Negative  Negative for back pain and myalgias  Skin: Negative  Negative for rash  Neurological: Negative    Negative for dizziness, light-headedness and headaches         PHQ-2/9 Depression Screening          Current Medications       Current Outpatient Medications:     aspirin (ECOTRIN LOW STRENGTH) 81 mg EC tablet, Take 1 tablet (81 mg total) by mouth daily, Disp: 90 tablet, Rfl: 0    atorvastatin (LIPITOR) 40 mg tablet, take 1 tablet by mouth every evening, Disp: 90 tablet, Rfl: 0    latanoprost (XALATAN) 0 005 % ophthalmic solution, Apply to eye, Disp: , Rfl:     magnesium Oxide (MAG-OX) 400 mg TABS, Take 400 mg by mouth 2 (two) times a day, Disp: , Rfl:     magnesium oxide (MAG-OX) 400 mg, Take 1 tablet (400 mg total) by mouth 2 (two) times a day, Disp: 180 tablet, Rfl: 0    meclizine (ANTIVERT) 25 mg tablet, Take 1 tablet (25 mg total) by mouth 3 (three) times a day as needed for dizziness, Disp: 30 tablet, Rfl: 0    nitrofurantoin (MACROBID) 100 mg capsule, Take 1 capsule (100 mg total) by mouth 2 (two) times a day for 5 days, Disp: 10 capsule, Rfl: 0    omeprazole (PriLOSEC) 20 mg delayed release capsule, Take 1 capsule (20 mg total) by mouth daily before breakfast, Disp: 90 capsule, Rfl: 3    phenazopyridine (PYRIDIUM) 200 mg tablet, Take 1 tablet (200 mg total) by mouth 3 (three) times a day with meals, Disp: 10 tablet, Rfl: 0    Riboflavin 400 MG TABS, Take 1 tablet (400 mg total) by mouth 2 (two) times a day, Disp: 180 tablet, Rfl: 0    albuterol (PROVENTIL HFA,VENTOLIN HFA) 90 mcg/act inhaler, Inhale 2 puffs every 6 (six) hours as needed (Patient not taking: No sig reported), Disp: , Rfl:     Spiriva Respimat 2 5 MCG/ACT AERS inhaler, , Disp: , Rfl:     Current Allergies     Allergies as of 07/22/2022 - Reviewed 07/22/2022   Allergen Reaction Noted    Ciprofloxacin  02/03/2020    Clindamycin  02/03/2020    Doxycycline  02/03/2020    Fluticasone-salmeterol Other (See Comments) 10/13/2020    Keflex [cephalexin]  02/03/2020    Naproxen Hives and Other (See Comments) 08/03/2017    Nsaids  10/04/2020    Penicillins 10/04/2020    Sulfa antibiotics Hives, Itching, and Swelling 08/03/2017            The following portions of the patient's history were reviewed and updated as appropriate: allergies, current medications, past family history, past medical history, past social history, past surgical history and problem list      Past Medical History:   Diagnosis Date    Cancer ([address] )     gallbladder    COVID-19     GERD (gastroesophageal reflux disease)     Glaucoma     History of gallbladder cancer     Approx  3 years ago    Hyperlipemia     Lightheadedness 5/3/2021    Murmur     Rheumatic fever     RUQ abdominal pain 5/3/2021       Past Surgical History:   Procedure Laterality Date    CHOLECYSTECTOMY      TONSILLECTOMY  CHILDHOOD       Family History   Problem Relation Age of Onset    Heart attack Mother     Kidney disease Father     Breast cancer Sister 77    No Known Problems Daughter     No Known Problems Maternal Grandmother     No Known Problems Paternal Grandmother     No Known Problems Sister     No Known Problems Maternal Aunt     No Known Problems Maternal Aunt     No Known Problems Maternal Aunt     No Known Problems Paternal Aunt     No Known Problems Paternal Aunt          Medications have been verified  Objective   BP 92/62   Pulse 72   Temp 98 9 °F (37 2 °C)   Ht 5' 3" (1 6 m)   Wt 76 2 kg (168 lb)   SpO2 98%   BMI 29 76 kg/m²        Physical Exam     Physical Exam  Vitals and nursing note reviewed  Constitutional:       General: She is not in acute distress  Appearance: She is well-developed  She is not ill-appearing  HENT:      Head: Normocephalic and atraumatic  Neck:      Trachea: No tracheal deviation  Pulmonary:      Effort: Pulmonary effort is normal  No tachypnea, accessory muscle usage or respiratory distress  Breath sounds: No stridor  Abdominal:      Palpations: Abdomen is soft  Tenderness: There is no abdominal tenderness   There is no right CVA tenderness or left CVA tenderness  Musculoskeletal:      Cervical back: No rigidity  Neurological:      Mental Status: She is alert and oriented to person, place, and time  Psychiatric:         Speech: Speech normal          Behavior: Behavior normal  Behavior is cooperative

## 2022-07-23 LAB — BACTERIA UR CULT: NORMAL

## 2022-07-25 LAB — HBA1C MFR BLD HPLC: 5.9 %

## 2022-08-01 DIAGNOSIS — I63.9 STROKE (HCC): ICD-10-CM

## 2022-08-01 RX ORDER — RIBOFLAVIN (VITAMIN B2) 400 MG
400 TABLET ORAL 2 TIMES DAILY
Qty: 180 TABLET | Refills: 0 | Status: SHIPPED | OUTPATIENT
Start: 2022-08-01

## 2022-08-01 NOTE — TELEPHONE ENCOUNTER
Patient requesting refill(s) of: Riboflavin 400 mg BID    Last filled: 4/6/2022 #180 x 0  Last appt: 7/22/2022  Next appt: 10/26/2022  Pharmacy: [facility]

## 2022-08-05 ENCOUNTER — TELEPHONE (OUTPATIENT)
Dept: OTHER | Facility: OTHER | Age: 72
End: 2022-08-05

## 2022-08-25 ENCOUNTER — TELEPHONE (OUTPATIENT)
Dept: FAMILY MEDICINE CLINIC | Facility: CLINIC | Age: 72
End: 2022-08-25

## 2022-08-25 NOTE — TELEPHONE ENCOUNTER
Patient called asking for an US of her leg as it has been hurting more than normal offered an apt for the patient she would like to see if the order can be placed before hand    Has been with pain for a few weeks but today it is much worse,

## 2022-08-26 NOTE — TELEPHONE ENCOUNTER
Please find out which leg, where it hurts? Upper or lower leg? Does it hurt more with walking, does it hurt all the time? Any swelling? If we are doing an U/s we are looking for a blood clot, is this what she thinks she has?

## 2022-08-31 NOTE — TELEPHONE ENCOUNTER
Left message for patient requesting call back  [email or web address] Message also sent       Will complete message until we hear back

## 2022-09-02 ENCOUNTER — OFFICE VISIT (OUTPATIENT)
Dept: FAMILY MEDICINE CLINIC | Facility: CLINIC | Age: 72
End: 2022-09-02
Payer: COMMERCIAL

## 2022-09-02 VITALS
TEMPERATURE: 98.3 F | HEART RATE: 73 BPM | WEIGHT: 168 LBS | DIASTOLIC BLOOD PRESSURE: 52 MMHG | BODY MASS INDEX: 29.77 KG/M2 | SYSTOLIC BLOOD PRESSURE: 98 MMHG | HEIGHT: 63 IN | OXYGEN SATURATION: 96 %

## 2022-09-02 DIAGNOSIS — H69.81 DYSFUNCTION OF RIGHT EUSTACHIAN TUBE: Primary | ICD-10-CM

## 2022-09-02 DIAGNOSIS — R25.2 MUSCLE CRAMPS: ICD-10-CM

## 2022-09-02 PROCEDURE — 3725F SCREEN DEPRESSION PERFORMED: CPT | Performed by: NURSE PRACTITIONER

## 2022-09-02 PROCEDURE — 1160F RVW MEDS BY RX/DR IN RCRD: CPT | Performed by: NURSE PRACTITIONER

## 2022-09-02 PROCEDURE — 99213 OFFICE O/P EST LOW 20 MIN: CPT | Performed by: NURSE PRACTITIONER

## 2022-09-02 RX ORDER — PREDNISONE 20 MG/1
20 TABLET ORAL 2 TIMES DAILY WITH MEALS
Qty: 10 TABLET | Refills: 0 | Status: SHIPPED | OUTPATIENT
Start: 2022-09-02 | End: 2022-09-07

## 2022-09-02 NOTE — PATIENT INSTRUCTIONS
Stop Lipitor for the next 1-2 weeks  If the pain improves, we will stop the Lipitor and start Crestor 10mg daily

## 2022-09-21 DIAGNOSIS — R25.2 MUSCLE CRAMPS: Primary | ICD-10-CM

## 2022-09-21 RX ORDER — LANOLIN ALCOHOL/MO/W.PET/CERES
400 CREAM (GRAM) TOPICAL 2 TIMES DAILY
Qty: 90 TABLET | Refills: 0 | Status: SHIPPED | OUTPATIENT
Start: 2022-09-21 | End: 2022-10-26 | Stop reason: SDUPTHER

## 2022-09-21 NOTE — TELEPHONE ENCOUNTER
Patient requesting refill(s) of: magnesium oxide 400 mg BID    Last filled: 6/20/2022 #180 x 0  Last appt: 9/2/2022  Next appt: 10/26/2022  Pharmacy: [facility]  Need to print rx

## 2022-09-21 NOTE — TELEPHONE ENCOUNTER
Patient called to inform PCP she stopped taking her Atorvastatin 40mg and the leg cramps stopped       Patient also needs refills on her Magnesium Oxide 400mg, 1 TAB BID, #180 with no refills Sent to [facility]

## 2022-10-07 ENCOUNTER — OFFICE VISIT (OUTPATIENT)
Dept: FAMILY MEDICINE CLINIC | Facility: CLINIC | Age: 72
End: 2022-10-07
Payer: COMMERCIAL

## 2022-10-07 ENCOUNTER — TELEPHONE (OUTPATIENT)
Dept: FAMILY MEDICINE CLINIC | Facility: CLINIC | Age: 72
End: 2022-10-07

## 2022-10-07 ENCOUNTER — APPOINTMENT (OUTPATIENT)
Dept: RADIOLOGY | Facility: CLINIC | Age: 72
End: 2022-10-07
Payer: COMMERCIAL

## 2022-10-07 VITALS
BODY MASS INDEX: 30.48 KG/M2 | OXYGEN SATURATION: 97 % | RESPIRATION RATE: 20 BRPM | HEART RATE: 72 BPM | TEMPERATURE: 98.4 F | WEIGHT: 172 LBS | DIASTOLIC BLOOD PRESSURE: 68 MMHG | SYSTOLIC BLOOD PRESSURE: 102 MMHG | HEIGHT: 63 IN

## 2022-10-07 DIAGNOSIS — M79.642 HAND PAIN, LEFT: ICD-10-CM

## 2022-10-07 DIAGNOSIS — M79.642 HAND PAIN, LEFT: Primary | ICD-10-CM

## 2022-10-07 DIAGNOSIS — S69.92XA FINGER INJURY, LEFT, INITIAL ENCOUNTER: Primary | ICD-10-CM

## 2022-10-07 PROCEDURE — 73130 X-RAY EXAM OF HAND: CPT

## 2022-10-07 PROCEDURE — 99213 OFFICE O/P EST LOW 20 MIN: CPT | Performed by: NURSE PRACTITIONER

## 2022-10-07 NOTE — Clinical Note
Patient questioning if she needs labs done for her visit with you 10/26  Her Hga1c was 5 9 7/25/22- I told her I wasn't sure what you wanted but would get this message and get back to her soon

## 2022-10-07 NOTE — PATIENT INSTRUCTIONS
Tylenol, ice, manish tape as discussed  Will call in a few hours with xray report  If symptoms continue, refer to hand surgeon or orthopedic

## 2022-10-07 NOTE — PROGRESS NOTES
[address] Primary Care        NAME: Matt Pedro is a 71 y o  female  : 1950    MRN: [id number]  DATE: 2022  TIME: 12:50 PM    Assessment and Plan   Finger injury, left, initial encounter [S69 92XA]  1  Finger injury, left, initial encounter           Patient Instructions     Patient Instructions   Tylenol, ice, manish tape as discussed  Will call in a few hours with xray report  If symptoms continue, refer to hand surgeon or orthopedic          Chief Complaint     Chief Complaint   Patient presents with    Hand Injury         History of Present Illness       Here for left middle finger injury  She reports swatting at bee and hit her finger on a trashcan  Walked in for appointment so went to xray first- reviewed images together, no obvious fracture seen      Review of Systems   Review of Systems   Constitutional: Negative for activity change, diaphoresis, fatigue and fever  HENT: Negative for congestion, facial swelling, hearing loss, rhinorrhea, sinus pressure, sinus pain, sneezing, sore throat and voice change  Eyes: Negative for discharge and visual disturbance  Respiratory: Negative for cough, choking, chest tightness, shortness of breath, wheezing and stridor  Cardiovascular: Negative for chest pain, palpitations and leg swelling  Gastrointestinal: Negative for abdominal distention, abdominal pain, constipation, diarrhea, nausea and vomiting  Endocrine: Negative for polydipsia, polyphagia and polyuria  Genitourinary: Negative for difficulty urinating, dysuria, frequency and urgency  Musculoskeletal: Positive for arthralgias, joint swelling and myalgias  Negative for back pain, gait problem, neck pain and neck stiffness  Skin: Negative for color change, rash and wound  Neurological: Positive for numbness (mild numbness reported in tip of 3rd middle finger)  Negative for dizziness, syncope, speech difficulty, weakness, light-headedness and headaches  Hematological: Negative for adenopathy  Does not bruise/bleed easily  Psychiatric/Behavioral: Negative for agitation, behavioral problems, confusion, hallucinations, sleep disturbance and suicidal ideas  The patient is not nervous/anxious            Current Medications       Current Outpatient Medications:     albuterol (PROVENTIL HFA,VENTOLIN HFA) 90 mcg/act inhaler, Inhale 2 puffs every 6 (six) hours as needed (Patient not taking: No sig reported), Disp: , Rfl:     aspirin (ECOTRIN LOW STRENGTH) 81 mg EC tablet, Take 1 tablet (81 mg total) by mouth daily, Disp: 90 tablet, Rfl: 0    atorvastatin (LIPITOR) 40 mg tablet, take 1 tablet by mouth every evening, Disp: 90 tablet, Rfl: 0    latanoprost (XALATAN) 0 005 % ophthalmic solution, Apply to eye, Disp: , Rfl:     magnesium Oxide (MAG-OX) 400 mg TABS, Take 1 tablet (400 mg total) by mouth 2 (two) times a day, Disp: 90 tablet, Rfl: 0    magnesium oxide (MAG-OX) 400 mg, Take 1 tablet (400 mg total) by mouth 2 (two) times a day, Disp: 180 tablet, Rfl: 0    meclizine (ANTIVERT) 25 mg tablet, Take 1 tablet (25 mg total) by mouth 3 (three) times a day as needed for dizziness, Disp: 30 tablet, Rfl: 0    omeprazole (PriLOSEC) 20 mg delayed release capsule, Take 1 capsule (20 mg total) by mouth daily before breakfast, Disp: 90 capsule, Rfl: 3    phenazopyridine (PYRIDIUM) 200 mg tablet, Take 1 tablet (200 mg total) by mouth 3 (three) times a day with meals, Disp: 10 tablet, Rfl: 0    Riboflavin 400 MG TABS, Take 1 tablet (400 mg total) by mouth 2 (two) times a day, Disp: 180 tablet, Rfl: 0    Spiriva Respimat 2 5 MCG/ACT AERS inhaler, , Disp: , Rfl:     Current Allergies     Allergies as of 10/07/2022 - Reviewed 10/07/2022   Allergen Reaction Noted    Ciprofloxacin  02/03/2020    Clindamycin  02/03/2020    Doxycycline  02/03/2020    Fluticasone-salmeterol Other (See Comments) 10/13/2020    Keflex [cephalexin]  02/03/2020    Naproxen Hives and Other (See Comments) 08/03/2017    Nsaids  10/04/2020    Penicillins  10/04/2020    Sulfa antibiotics Hives, Itching, and Swelling 08/03/2017            The following portions of the patient's history were reviewed and updated as appropriate: allergies, current medications, past family history, past medical history, past social history, past surgical history and problem list      Past Medical History:   Diagnosis Date    Cancer ([address] )     gallbladder    COVID-19     GERD (gastroesophageal reflux disease)     Glaucoma     History of gallbladder cancer     Approx  3 years ago    Hyperlipemia     Lightheadedness 5/3/2021    Murmur     Rheumatic fever     RUQ abdominal pain 5/3/2021       Past Surgical History:   Procedure Laterality Date    CHOLECYSTECTOMY      TONSILLECTOMY  CHILDHOOD       Family History   Problem Relation Age of Onset    Heart attack Mother     Kidney disease Father     Breast cancer Sister 77    No Known Problems Daughter     No Known Problems Maternal Grandmother     No Known Problems Paternal Grandmother     No Known Problems Sister     No Known Problems Maternal Aunt     No Known Problems Maternal Aunt     No Known Problems Maternal Aunt     No Known Problems Paternal Aunt     No Known Problems Paternal Aunt          Medications have been verified  Objective   /68   Pulse 72   Temp 98 4 °F (36 9 °C) (Tympanic)   Resp 20   Ht 5' 3" (1 6 m)   Wt 78 kg (172 lb)   SpO2 97%   BMI 30 47 kg/m²        Physical Exam     Physical Exam  Vitals and nursing note reviewed  Constitutional:       General: She is not in acute distress  Appearance: She is well-developed  She is not ill-appearing or diaphoretic  Pulmonary:      Effort: Pulmonary effort is normal  No respiratory distress  Musculoskeletal:         General: Swelling and tenderness present  Hands:    Skin:     Coloration: Skin is not pale     Neurological:      Mental Status: She is alert and oriented to person, place, and time  She is not disoriented  Psychiatric:         Mood and Affect: Mood normal          Speech: Speech normal          Behavior: Behavior normal  Behavior is cooperative  Thought Content:  Thought content normal          Judgment: Judgment normal

## 2022-10-26 ENCOUNTER — OFFICE VISIT (OUTPATIENT)
Dept: FAMILY MEDICINE CLINIC | Facility: CLINIC | Age: 72
End: 2022-10-26
Payer: COMMERCIAL

## 2022-10-26 VITALS
OXYGEN SATURATION: 98 % | BODY MASS INDEX: 30.65 KG/M2 | HEIGHT: 63 IN | SYSTOLIC BLOOD PRESSURE: 112 MMHG | TEMPERATURE: 98.6 F | WEIGHT: 173 LBS | HEART RATE: 86 BPM | DIASTOLIC BLOOD PRESSURE: 68 MMHG

## 2022-10-26 DIAGNOSIS — Z12.31 ENCOUNTER FOR SCREENING MAMMOGRAM FOR MALIGNANT NEOPLASM OF BREAST: ICD-10-CM

## 2022-10-26 DIAGNOSIS — E78.1 PURE HYPERTRIGLYCERIDEMIA: Primary | ICD-10-CM

## 2022-10-26 DIAGNOSIS — R73.03 PREDIABETES: ICD-10-CM

## 2022-10-26 DIAGNOSIS — R25.2 MUSCLE CRAMPS: ICD-10-CM

## 2022-10-26 DIAGNOSIS — I63.9 STROKE (HCC): ICD-10-CM

## 2022-10-26 DIAGNOSIS — R01.1 MURMUR: ICD-10-CM

## 2022-10-26 DIAGNOSIS — I69.30 HISTORY OF CVA WITH RESIDUAL DEFICIT: ICD-10-CM

## 2022-10-26 DIAGNOSIS — Z87.891 HISTORY OF TOBACCO ABUSE: ICD-10-CM

## 2022-10-26 DIAGNOSIS — Z87.891 STOPPED SMOKING WITH GREATER THAN 20 PACK YEAR HISTORY: ICD-10-CM

## 2022-10-26 LAB — HBA1C MFR BLD HPLC: 5.8 %

## 2022-10-26 PROCEDURE — 99214 OFFICE O/P EST MOD 30 MIN: CPT | Performed by: NURSE PRACTITIONER

## 2022-10-26 RX ORDER — ROSUVASTATIN CALCIUM 5 MG/1
5 TABLET, COATED ORAL DAILY
Qty: 30 TABLET | Refills: 0 | Status: SHIPPED | OUTPATIENT
Start: 2022-10-26

## 2022-10-26 RX ORDER — LANOLIN ALCOHOL/MO/W.PET/CERES
400 CREAM (GRAM) TOPICAL 2 TIMES DAILY
Qty: 90 TABLET | Refills: 3 | Status: SHIPPED | OUTPATIENT
Start: 2022-10-26

## 2022-10-26 NOTE — PROGRESS NOTES
[address] [place] [address] Primary Care        NAME: Rachel Charles is a 71 y o  female  : 1950    MRN: [id number]  DATE: 2022  TIME: 1:32 PM    Assessment and Plan   Pure hypertriglyceridemia [E78 1]  1  Pure hypertriglyceridemia  rosuvastatin (CRESTOR) 5 mg tablet    Lipid panel   2  Muscle cramps  magnesium Oxide (MAG-OX) 400 mg TABS   3  Stroke ([address] )     4  Murmur     5  History of CVA with residual deficit  Comprehensive metabolic panel   6  Prediabetes  HEMOGLOBIN A1C W/ EAG ESTIMATION    Comprehensive metabolic panel   7  Encounter for screening mammogram for malignant neoplasm of breast  Mammo screening bilateral w 3d & cad   8  History of tobacco abuse  [place] lung screening program   9  Stopped smoking with greater than 20 pack year history  CT lung screening program   1  Pure hypertriglyceridemia   did not tolerate lipitor, willing to try crestor to see if she can tolerate this statin  With history of CVA would like her on a Statin medication  - rosuvastatin (CRESTOR) 5 mg tablet; Take 1 tablet (5 mg total) by mouth daily  Dispense: 30 tablet; Refill: 0  - Lipid panel; Future    2  Muscle cramps    - magnesium Oxide (MAG-OX) 400 mg TABS; Take 1 tablet (400 mg total) by mouth 2 (two) times a day  Dispense: 90 tablet; Refill: 3    3  Stroke ([address] )      4  Murmur      5  History of CVA with residual deficit    - Comprehensive metabolic panel; Future    6  Prediabetes  hgbA1c was well controlled 4 months ago  - HEMOGLOBIN A1C W/ EAG ESTIMATION; Future  - Comprehensive metabolic panel; Future    7  Encounter for screening mammogram for malignant neoplasm of breast  Due in December for mammogram    - Mammo screening bilateral w 3d & cad; Future    8  History of tobacco abuse  Smoked for 35 years  - [place] lung screening program; Future    9   Stopped smoking with greater than 20 pack year history    - CT lung screening program; Future        Patient Instructions     Patient Instructions           Chief Complaint     Chief Complaint   Patient presents with   • Follow-up         History of Present Illness       Patient here for routine follow up visit for prediabetes  HLD- was on Lipitor  But was having cramping  stopped this medication after last visit and cramping improved  Willing to try another statin  Prediabetes- has been watching her diet  HgbA1c was 5 9 when last checked  Doing well  Continues to feel depressed since her husbands death, having trouble returning to her oncologist since this is where her  went too, he was her   Review of Systems   Review of Systems    PHQ-2/9 Depression Screening    Little interest or pleasure in doing things: 0 - not at all  Feeling down, depressed, or hopeless: 0 - not at all  Trouble falling or staying asleep, or sleeping too much: 0 - not at all  Feeling tired or having little energy: 0 - not at all  Poor appetite or overeatin - not at all  Feeling bad about yourself - or that you are a failure or have let yourself or your family down: 0 - not at all  Trouble concentrating on things, such as reading the newspaper or watching television: 0 - not at all  Moving or speaking so slowly that other people could have noticed   Or the opposite - being so fidgety or restless that you have been moving around a lot more than usual: 0 - not at all  Thoughts that you would be better off dead, or of hurting yourself in some way: 0 - not at all  PHQ-9 Score: 0   PHQ-9 Interpretation: No or Minimal depression         Current Medications       Current Outpatient Medications:   •  aspirin (ECOTRIN LOW STRENGTH) 81 mg EC tablet, Take 1 tablet (81 mg total) by mouth daily, Disp: 90 tablet, Rfl: 0  •  latanoprost (XALATAN) 0 005 % ophthalmic solution, Apply to eye, Disp: , Rfl:   •  magnesium Oxide (MAG-OX) 400 mg TABS, Take 1 tablet (400 mg total) by mouth 2 (two) times a day, Disp: 90 tablet, Rfl: 3  •  meclizine (ANTIVERT) 25 mg tablet, Take 1 tablet (25 mg total) by mouth 3 (three) times a day as needed for dizziness, Disp: 30 tablet, Rfl: 0  •  omeprazole (PriLOSEC) 20 mg delayed release capsule, Take 1 capsule (20 mg total) by mouth daily before breakfast, Disp: 90 capsule, Rfl: 3  •  Riboflavin 400 MG TABS, Take 1 tablet (400 mg total) by mouth 2 (two) times a day, Disp: 180 tablet, Rfl: 0  •  rosuvastatin (CRESTOR) 5 mg tablet, Take 1 tablet (5 mg total) by mouth daily, Disp: 30 tablet, Rfl: 0  •  albuterol (PROVENTIL HFA,VENTOLIN HFA) 90 mcg/act inhaler, Inhale 2 puffs every 6 (six) hours as needed (Patient not taking: No sig reported), Disp: , Rfl:   •  Spiriva Respimat 2 5 MCG/ACT AERS inhaler, , Disp: , Rfl:     Current Allergies     Allergies as of 10/26/2022 - Reviewed 10/26/2022   Allergen Reaction Noted   • Ciprofloxacin  02/03/2020   • Clindamycin  02/03/2020   • Doxycycline  02/03/2020   • Fluticasone-salmeterol Other (See Comments) 10/13/2020   • Keflex [cephalexin]  02/03/2020   • Naproxen Hives and Other (See Comments) 08/03/2017   • Nsaids  10/04/2020   • Penicillins  10/04/2020   • Sulfa antibiotics Hives, Itching, and Swelling 08/03/2017   • Lipitor [atorvastatin] Myalgia 10/26/2022            The following portions of the patient's history were reviewed and updated as appropriate: allergies, current medications, past family history, past medical history, past social history, past surgical history and problem list      Past Medical History:   Diagnosis Date   • Cancer ([facility] )     gallbladder   • COVID-19    • GERD (gastroesophageal reflux disease)    • Glaucoma    • History of gallbladder cancer     Approx   3 years ago   • Hyperlipemia    • Lightheadedness 5/3/2021   • Murmur    • Rheumatic fever    • RUQ abdominal pain 5/3/2021       Past Surgical History:   Procedure Laterality Date   • CHOLECYSTECTOMY     • TONSILLECTOMY  CHILDHOOD       Family History   Problem Relation Age of Onset   • Heart attack Mother    • Kidney disease Father    • Breast cancer Sister 77   • No Known Problems Daughter    • No Known Problems Maternal Grandmother    • No Known Problems Paternal Grandmother    • No Known Problems Sister    • No Known Problems Maternal Aunt    • No Known Problems Maternal Aunt    • No Known Problems Maternal Aunt    • No Known Problems Paternal Aunt    • No Known Problems Paternal Aunt          Medications have been verified  Objective   /68   Pulse 86   Temp 98 6 °F (37 °C)   Ht 5' 3" (1 6 m)   Wt 78 5 kg (173 lb)   SpO2 98%   BMI 30 65 kg/m²        Physical Exam     Physical Exam  Vitals and nursing note reviewed  Constitutional:       General: She is not in acute distress  Appearance: Normal appearance  She is well-developed  She is not ill-appearing  Eyes:      General: Lids are normal    Cardiovascular:      Rate and Rhythm: Normal rate and regular rhythm  Heart sounds: Normal heart sounds, S1 normal and S2 normal  No murmur heard  No friction rub  No gallop  Pulmonary:      Effort: Pulmonary effort is normal  No respiratory distress  Breath sounds: Normal breath sounds  No decreased breath sounds or wheezing  Musculoskeletal:         General: No tenderness or deformity  Normal range of motion  Skin:     General: Skin is warm  Findings: No erythema or rash  Neurological:      Mental Status: She is alert and oriented to person, place, and time  Psychiatric:         Behavior: Behavior normal  Behavior is cooperative  Thought Content:  Thought content normal

## 2022-11-14 ENCOUNTER — HOSPITAL ENCOUNTER (OUTPATIENT)
Dept: CT IMAGING | Facility: HOSPITAL | Age: 72
Discharge: HOME | End: 2022-11-14

## 2022-11-14 DIAGNOSIS — Z87.891 STOPPED SMOKING WITH GREATER THAN 20 PACK YEAR HISTORY: ICD-10-CM

## 2022-11-14 DIAGNOSIS — Z87.891 HISTORY OF TOBACCO ABUSE: ICD-10-CM

## 2022-11-15 ENCOUNTER — NURSE TRIAGE (OUTPATIENT)
Dept: OTHER | Facility: OTHER | Age: 72
End: 2022-11-15

## 2022-11-15 DIAGNOSIS — R05.9 COUGH, UNSPECIFIED TYPE: Primary | ICD-10-CM

## 2022-11-15 DIAGNOSIS — J06.9 UPPER RESPIRATORY TRACT INFECTION, UNSPECIFIED TYPE: Primary | ICD-10-CM

## 2022-11-15 RX ORDER — PREDNISONE 20 MG/1
20 TABLET ORAL 2 TIMES DAILY
Qty: 10 TABLET | Refills: 0 | Status: SHIPPED | OUTPATIENT
Start: 2022-11-15 | End: 2022-11-20

## 2022-11-15 RX ORDER — AZITHROMYCIN 250 MG/1
TABLET, FILM COATED ORAL
Qty: 6 TABLET | Refills: 0 | Status: SHIPPED | OUTPATIENT
Start: 2022-11-15 | End: 2022-11-19

## 2022-11-15 NOTE — TELEPHONE ENCOUNTER
Reason for Disposition  • [1] Caller has URGENT medicine question about med that PCP or specialist prescribed AND [2] triager unable to answer question    Answer Assessment - Initial Assessment Questions  1  NAME of MEDICATION: "What medicine are you calling about?"      Prednisone and Zithromax     2  QUESTION: "What is your question?" (e g , medication refill, side effect)     "The medication was not sent to the pharmacy"    3  PRESCRIBING HCP: "Who prescribed it?" Reason: if prescribed by specialist, call should be referred to that group  Prisca Davidson     4  SYMPTOMS: "Do you have any symptoms?"      Harsh cough, congestion     5  SEVERITY: If symptoms are present, ask "Are they mild, moderate or severe?"      Moderate     6   PREGNANCY:  "Is there any chance that you are pregnant?" "When was your last menstrual period?"     N/A    Protocols used: MEDICATION QUESTION CALL-ADULT-

## 2022-11-15 NOTE — TELEPHONE ENCOUNTER
Regarding: urgent refill- Prednisone/ Daralyn Re  ----- Message from Vassie Canavan, [address] sent at 11/15/2022  6:02 PM EST -----  "Patient states that she was supposed to get a prescription for Prednisone and Z jerry today sent over to [facility] in [place]   She says that the pharmacy does not have her medication and she would like to get this up today

## 2022-11-15 NOTE — TELEPHONE ENCOUNTER
Patient called asking for an appt today  Has a very bad cough, head congestion, a little SOB  Does not feel like she is suffocating  Said her symptoms started Saturday  Since there were no appts available today and patient was experiencing SOB I suggested patient go to Urgent Care  Patient refuses  I asked if she would like a covid test in the parking lot, she said she took one yesterday and it was negative  Is asking if a medication can be sent into the pharmacy for her  Please advise

## 2022-11-16 RX ORDER — PREDNISONE 20 MG/1
20 TABLET ORAL 2 TIMES DAILY WITH MEALS
Qty: 10 TABLET | Refills: 0 | Status: SHIPPED | OUTPATIENT
Start: 2022-11-16 | End: 2022-11-21

## 2022-11-16 RX ORDER — AZITHROMYCIN 250 MG/1
TABLET, FILM COATED ORAL
Qty: 6 TABLET | Refills: 0 | Status: SHIPPED | OUTPATIENT
Start: 2022-11-16 | End: 2022-11-19

## 2022-11-21 ENCOUNTER — OFFICE VISIT (OUTPATIENT)
Dept: FAMILY MEDICINE CLINIC | Facility: CLINIC | Age: 72
End: 2022-11-21

## 2022-11-21 VITALS
WEIGHT: 171 LBS | HEIGHT: 63 IN | HEART RATE: 76 BPM | TEMPERATURE: 98.4 F | DIASTOLIC BLOOD PRESSURE: 80 MMHG | SYSTOLIC BLOOD PRESSURE: 120 MMHG | RESPIRATION RATE: 17 BRPM | OXYGEN SATURATION: 97 % | BODY MASS INDEX: 30.3 KG/M2

## 2022-11-21 DIAGNOSIS — L01.00 IMPETIGO: ICD-10-CM

## 2022-11-21 DIAGNOSIS — J06.9 VIRAL URI WITH COUGH: Primary | ICD-10-CM

## 2022-11-21 RX ORDER — DEXTROMETHORPHAN HYDROBROMIDE AND PROMETHAZINE HYDROCHLORIDE 15; 6.25 MG/5ML; MG/5ML
5 SOLUTION ORAL 4 TIMES DAILY PRN
Qty: 240 ML | Refills: 1 | Status: SHIPPED | OUTPATIENT
Start: 2022-11-21

## 2022-11-21 RX ORDER — PREDNISONE 20 MG/1
20 TABLET ORAL DAILY
Qty: 5 TABLET | Refills: 0 | Status: SHIPPED | OUTPATIENT
Start: 2022-11-21 | End: 2022-11-26

## 2022-11-21 NOTE — PROGRESS NOTES
[address] Primary Care        NAME: Noah Pablo is a 71 y o  female  : 1950    MRN: [id number]  DATE: 2022  TIME: 4:07 PM    Assessment and Plan   Viral URI with cough [J06 9]  1  Viral URI with cough  predniSONE 20 mg tablet    Promethazine-DM (PHENERGAN-DM) 6 25-15 mg/5 mL oral syrup      2  Impetigo  mupirocin (BACTROBAN) 2 % ointment            Patient Instructions     Patient Instructions   Take prednisone and Promethazine DM as ordered  Increase fluids and rest  Please use bactroban as ordered on rash under right nostril  Call with any new or worsening symptoms          Chief Complaint     Chief Complaint   Patient presents with   • Nasal Congestion         History of Present Illness       Reports today still feeling sick  Still having a "bad cough" and congestion  Does not like to "take anything over the counter" - did take Z pack and Prednisone, felt like the prednisone did help  Has been using the albuterol inhaler which has helped  No fevers  Most frustrating symptom is her cough  Pt is a former smoker, smoked for about 20 years  Pt would like a new dose of prednisone today and prescription cough syrup  Also noticed an area under her right nostril that is reddened, raised, with a yellow/crusty center  Has been using triple antibiotic ointment, feels like it is spreading, would like something stronger - Bactroban sent  Reported she stopped taking her Crestor due to muscle pains  Review of Systems   Review of Systems   Constitutional: Negative for activity change, chills, fatigue and fever  HENT: Positive for congestion and postnasal drip  Negative for ear pain, rhinorrhea, sinus pressure and sore throat  Eyes: Negative for pain, discharge and redness  Respiratory: Positive for cough  Negative for wheezing  Cardiovascular: Negative for chest pain  Gastrointestinal: Negative for constipation, diarrhea, nausea and vomiting     Musculoskeletal: Negative for myalgias  Skin: Positive for rash ( reddened, raised, with a yellow/crusty center under right nostril)  Neurological: Negative for dizziness and headaches           Current Medications       Current Outpatient Medications:   •  albuterol (PROVENTIL HFA,VENTOLIN HFA) 90 mcg/act inhaler, Inhale 2 puffs every 6 (six) hours as needed, Disp: , Rfl:   •  aspirin (ECOTRIN LOW STRENGTH) 81 mg EC tablet, Take 1 tablet (81 mg total) by mouth daily, Disp: 90 tablet, Rfl: 0  •  latanoprost (XALATAN) 0 005 % ophthalmic solution, Apply to eye, Disp: , Rfl:   •  magnesium Oxide (MAG-OX) 400 mg TABS, Take 1 tablet (400 mg total) by mouth 2 (two) times a day, Disp: 90 tablet, Rfl: 3  •  meclizine (ANTIVERT) 25 mg tablet, Take 1 tablet (25 mg total) by mouth 3 (three) times a day as needed for dizziness, Disp: 30 tablet, Rfl: 0  •  mupirocin (BACTROBAN) 2 % ointment, Apply topically 3 (three) times a day, Disp: 22 g, Rfl: 0  •  omeprazole (PriLOSEC) 20 mg delayed release capsule, Take 1 capsule (20 mg total) by mouth daily before breakfast, Disp: 90 capsule, Rfl: 3  •  predniSONE 20 mg tablet, Take 1 tablet (20 mg total) by mouth daily for 5 days, Disp: 5 tablet, Rfl: 0  •  Promethazine-DM (PHENERGAN-DM) 6 25-15 mg/5 mL oral syrup, Take 5 mL by mouth 4 (four) times a day as needed for cough, Disp: 240 mL, Rfl: 1  •  Riboflavin 400 MG TABS, Take 1 tablet (400 mg total) by mouth 2 (two) times a day, Disp: 180 tablet, Rfl: 0  •  Spiriva Respimat 2 5 MCG/ACT AERS inhaler, , Disp: , Rfl:     Current Allergies     Allergies as of 11/21/2022 - Reviewed 11/21/2022   Allergen Reaction Noted   • Ciprofloxacin  02/03/2020   • Clindamycin  02/03/2020   • Doxycycline  02/03/2020   • Fluticasone-salmeterol Other (See Comments) 10/13/2020   • Keflex [cephalexin]  02/03/2020   • Naproxen Hives and Other (See Comments) 08/03/2017   • Nsaids  10/04/2020   • Penicillins  10/04/2020   • Sulfa antibiotics Hives, Itching, and Swelling 08/03/2017 • Lipitor [atorvastatin] Myalgia 10/26/2022            The following portions of the patient's history were reviewed and updated as appropriate: allergies, current medications, past family history, past medical history, past social history, past surgical history and problem list      Past Medical History:   Diagnosis Date   • Cancer ([address] )     gallbladder   • COVID-19    • GERD (gastroesophageal reflux disease)    • Glaucoma    • History of gallbladder cancer     Approx  3 years ago   • Hyperlipemia    • Lightheadedness 5/3/2021   • Murmur    • Rheumatic fever    • RUQ abdominal pain 5/3/2021       Past Surgical History:   Procedure Laterality Date   • CHOLECYSTECTOMY     • TONSILLECTOMY  CHILDHOOD       Family History   Problem Relation Age of Onset   • Heart attack Mother    • Kidney disease Father    • Breast cancer Sister 77   • No Known Problems Daughter    • No Known Problems Maternal Grandmother    • No Known Problems Paternal Grandmother    • No Known Problems Sister    • No Known Problems Maternal Aunt    • No Known Problems Maternal Aunt    • No Known Problems Maternal Aunt    • No Known Problems Paternal Aunt    • No Known Problems Paternal Aunt          Medications have been verified  Objective   /80   Pulse 76   Temp 98 4 °F (36 9 °C)   Resp 17   Ht 5' 3" (1 6 m)   Wt 77 6 kg (171 lb)   SpO2 97%   BMI 30 29 kg/m²        Physical Exam     Physical Exam  Vitals and nursing note reviewed  Constitutional:       General: She is not in acute distress  Appearance: She is well-developed and well-nourished  She is not diaphoretic  HENT:      Head: Normocephalic and atraumatic  Right Ear: Tympanic membrane, ear canal and external ear normal       Left Ear: Tympanic membrane, ear canal and external ear normal       Nose: Nose normal       Mouth/Throat:      Mouth: Mucous membranes are normal       Pharynx: Uvula midline   Posterior oropharyngeal erythema (cobblestoning to posterior pharynx) present  No oropharyngeal exudate  Eyes:      General:         Right eye: No discharge  Left eye: No discharge  Extraocular Movements: EOM normal       Conjunctiva/sclera: Conjunctivae normal    Neck:      Thyroid: No thyromegaly  Cardiovascular:      Rate and Rhythm: Normal rate and regular rhythm  Heart sounds: Normal heart sounds  No murmur heard  No friction rub  No gallop  Pulmonary:      Effort: Pulmonary effort is normal  No respiratory distress  Breath sounds: Wheezing (very mild expiratory wheeze in right upper lobe, otherwise CTA) present  No rales  Musculoskeletal:         General: Normal range of motion  Cervical back: Normal range of motion and neck supple  Lymphadenopathy:      Cervical: No cervical adenopathy  Skin:     General: Skin is warm and dry  Findings: Rash (under right nostril -  reddened, raised, with a yellow/crusty center) present  Neurological:      Mental Status: She is alert and oriented to person, place, and time  Cranial Nerves: No cranial nerve deficit  Coordination: Coordination normal    Psychiatric:         Mood and Affect: Mood and affect normal          Behavior: Behavior normal          Thought Content:  Thought content normal          Judgment: Judgment normal

## 2022-11-21 NOTE — PATIENT INSTRUCTIONS
Take prednisone and Promethazine DM as ordered  Increase fluids and rest  Please use bactroban as ordered on rash under right nostril  Call with any new or worsening symptoms

## 2022-11-28 ENCOUNTER — OFFICE VISIT (OUTPATIENT)
Dept: FAMILY MEDICINE CLINIC | Facility: CLINIC | Age: 72
End: 2022-11-28

## 2022-11-28 VITALS
BODY MASS INDEX: 30.79 KG/M2 | WEIGHT: 173.8 LBS | DIASTOLIC BLOOD PRESSURE: 62 MMHG | HEIGHT: 63 IN | TEMPERATURE: 96.7 F | RESPIRATION RATE: 18 BRPM | HEART RATE: 68 BPM | OXYGEN SATURATION: 98 % | SYSTOLIC BLOOD PRESSURE: 102 MMHG

## 2022-11-28 DIAGNOSIS — J44.9 COPD, GROUP A, BY GOLD 2017 CLASSIFICATION (HCC): ICD-10-CM

## 2022-11-28 DIAGNOSIS — N30.01 ACUTE CYSTITIS WITH HEMATURIA: Primary | ICD-10-CM

## 2022-11-28 DIAGNOSIS — N30.01 ACUTE CYSTITIS WITH HEMATURIA: ICD-10-CM

## 2022-11-28 DIAGNOSIS — L01.00 IMPETIGO: ICD-10-CM

## 2022-11-28 LAB
SL AMB  POCT GLUCOSE, UA: NEGATIVE
SL AMB LEUKOCYTE ESTERASE,UA: ABNORMAL
SL AMB POCT BILIRUBIN,UA: NEGATIVE
SL AMB POCT BLOOD,UA: ABNORMAL
SL AMB POCT CLARITY,UA: CLEAR
SL AMB POCT COLOR,UA: YELLOW
SL AMB POCT KETONES,UA: NEGATIVE
SL AMB POCT NITRITE,UA: NEGATIVE
SL AMB POCT PH,UA: 5.5
SL AMB POCT SPECIFIC GRAVITY,UA: >=1.03
SL AMB POCT URINE PROTEIN: ABNORMAL
SL AMB POCT UROBILINOGEN: ABNORMAL

## 2022-11-28 RX ORDER — ALBUTEROL SULFATE 90 UG/1
2 AEROSOL, METERED RESPIRATORY (INHALATION) EVERY 6 HOURS PRN
Qty: 18 G | Refills: 0 | Status: SHIPPED | OUTPATIENT
Start: 2022-11-28

## 2022-11-28 RX ORDER — NITROFURANTOIN 25; 75 MG/1; MG/1
100 CAPSULE ORAL 2 TIMES DAILY
Qty: 10 CAPSULE | Refills: 0 | Status: SHIPPED | OUTPATIENT
Start: 2022-11-28 | End: 2022-12-03

## 2022-11-28 NOTE — PROGRESS NOTES
[facility] Primary Care        NAME: Marv Gentile is a 71 y o  female  : 1950    MRN: [id number]  DATE: 2022  TIME: 12:11 PM    Assessment and Plan   1  Acute cystitis with hematuria  -will treat with Macrobid based on urine dip and symptoms, as well as allergy profile  -    POCT urine dip  -     Urine culture; Future  -     nitrofurantoin (MACROBID) 100 mg capsule; Take 1 capsule (100 mg total) by mouth 2 (two) times a day for 5 days    2  Impetigo  -pt reports no improvement with mupirocin over a week, advised to scheduled with dermatology if lesion does not resolve within 2-3 weeks   -    Ambulatory Referral to Dermatology; Future    3  COPD, group A, by GOLD 2017 classification ([facility] [address] )  -pt requesting refill of this    -   albuterol (PROVENTIL HFA,VENTOLIN HFA) 90 mcg/act inhaler; Inhale 2 puffs every 6 (six) hours as needed for wheezing or shortness of breath             Chief Complaint     Chief Complaint   Patient presents with   • Cystitis     Pt c/o urinary hesitancy, blood in urine, burning with urination  Wants printed script for any medication sent         History of Present Illness       78yo female with COPD here for same-day visit  Reports burning with urination, frequency and urgency that started last night  Denies fevers but does have chills, lower back and abdominal pain  No nausea/vomiting  Review of Systems   Review of Systems   Constitutional: Positive for activity change, appetite change and chills  Negative for fatigue  Respiratory: Positive for cough  Negative for shortness of breath  Gastrointestinal: Positive for abdominal pain  Negative for nausea and vomiting  Genitourinary: Positive for dysuria, frequency and urgency  Musculoskeletal: Positive for back pain           Current Medications       Current Outpatient Medications:   •  albuterol (PROVENTIL HFA,VENTOLIN HFA) 90 mcg/act inhaler, Inhale 2 puffs every 6 (six) hours as needed for wheezing or shortness of breath, Disp: 18 g, Rfl: 0  •  aspirin (ECOTRIN LOW STRENGTH) 81 mg EC tablet, Take 1 tablet (81 mg total) by mouth daily, Disp: 90 tablet, Rfl: 0  •  latanoprost (XALATAN) 0 005 % ophthalmic solution, Apply to eye, Disp: , Rfl:   •  magnesium Oxide (MAG-OX) 400 mg TABS, Take 1 tablet (400 mg total) by mouth 2 (two) times a day, Disp: 90 tablet, Rfl: 3  •  meclizine (ANTIVERT) 25 mg tablet, Take 1 tablet (25 mg total) by mouth 3 (three) times a day as needed for dizziness, Disp: 30 tablet, Rfl: 0  •  mupirocin (BACTROBAN) 2 % ointment, Apply topically 3 (three) times a day, Disp: 22 g, Rfl: 0  •  nitrofurantoin (MACROBID) 100 mg capsule, Take 1 capsule (100 mg total) by mouth 2 (two) times a day for 5 days, Disp: 10 capsule, Rfl: 0  •  omeprazole (PriLOSEC) 20 mg delayed release capsule, Take 1 capsule (20 mg total) by mouth daily before breakfast, Disp: 90 capsule, Rfl: 3  •  Promethazine-DM (PHENERGAN-DM) 6 25-15 mg/5 mL oral syrup, Take 5 mL by mouth 4 (four) times a day as needed for cough, Disp: 240 mL, Rfl: 1  •  Riboflavin 400 MG TABS, Take 1 tablet (400 mg total) by mouth 2 (two) times a day, Disp: 180 tablet, Rfl: 0  •  Spiriva Respimat 2 5 MCG/ACT AERS inhaler, , Disp: , Rfl:     Current Allergies     Allergies as of 11/28/2022 - Reviewed 11/28/2022   Allergen Reaction Noted   • Ciprofloxacin  02/03/2020   • Clindamycin  02/03/2020   • Doxycycline  02/03/2020   • Fluticasone-salmeterol Other (See Comments) 10/13/2020   • Keflex [cephalexin]  02/03/2020   • Naproxen Hives and Other (See Comments) 08/03/2017   • Nsaids  10/04/2020   • Penicillins  10/04/2020   • Sulfa antibiotics Hives, Itching, and Swelling 08/03/2017   • Lipitor [atorvastatin] Myalgia 10/26/2022            The following portions of the patient's history were reviewed and updated as appropriate: allergies, current medications, past family history, past medical history, past social history, past surgical history and problem list      Past Medical History:   Diagnosis Date   • Cancer ([facility] [address] )     gallbladder   • COVID-19    • GERD (gastroesophageal reflux disease)    • Glaucoma    • History of gallbladder cancer     Approx  3 years ago   • Hyperlipemia    • Lightheadedness 5/3/2021   • Murmur    • Rheumatic fever    • RUQ abdominal pain 5/3/2021       Past Surgical History:   Procedure Laterality Date   • CHOLECYSTECTOMY     • TONSILLECTOMY  CHILDHOOD       Family History   Problem Relation Age of Onset   • Heart attack Mother    • Kidney disease Father    • Breast cancer Sister 77   • No Known Problems Daughter    • No Known Problems Maternal Grandmother    • No Known Problems Paternal Grandmother    • No Known Problems Sister    • No Known Problems Maternal Aunt    • No Known Problems Maternal Aunt    • No Known Problems Maternal Aunt    • No Known Problems Paternal Aunt    • No Known Problems Paternal Aunt          Medications have been verified  Objective   /62   Pulse 68   Temp (!) 96 7 °F (35 9 °C)   Resp 18   Ht 5' 3" (1 6 m)   Wt 78 8 kg (173 lb 12 8 oz)   SpO2 98%   BMI 30 79 kg/m²        Physical Exam     Physical Exam  Vitals reviewed  Constitutional:       General: She is not in acute distress  Appearance: She is normal weight  Pulmonary:      Effort: Pulmonary effort is normal  No respiratory distress  Breath sounds: Normal breath sounds  No wheezing, rhonchi or rales  Abdominal:      General: Abdomen is flat  Bowel sounds are normal  There is no distension  Palpations: Abdomen is soft  Tenderness: There is abdominal tenderness  There is no right CVA tenderness, left CVA tenderness, guarding or rebound  Skin:     Findings: Wound present  Comments: Ulcerated plaque above right vermilion border    Neurological:      Mental Status: She is alert     Psychiatric:         Mood and Affect: Mood normal          Behavior: Behavior normal              Results:  Lab Results Component Value Date    SODIUM 140 09/19/2021    K 3 8 09/19/2021     (H) 09/19/2021    CO2 26 09/19/2021    BUN 16 09/19/2021    CREATININE 0 70 09/19/2021    GLUC 104 09/19/2021    CALCIUM 8 3 09/19/2021       Lab Results   Component Value Date    HGBA1C 5 8 (H) 10/26/2022       Lab Results   Component Value Date    WBC 5 91 09/18/2021    HGB 14 5 09/18/2021    HCT 43 8 09/18/2021    MCV 97 09/18/2021     09/18/2021

## 2022-11-29 LAB — BACTERIA UR CULT: ABNORMAL

## 2022-12-01 ENCOUNTER — RA CDI HCC (OUTPATIENT)
Dept: OTHER | Facility: HOSPITAL | Age: 72
End: 2022-12-01

## 2022-12-01 NOTE — PROGRESS NOTES
[facility]  coding opportunities       Chart reviewed, no opportunity found:   [address]        Patients Insurance     Medicare Insurance: [employer]

## 2022-12-07 ENCOUNTER — APPOINTMENT (OUTPATIENT)
Dept: LAB | Facility: CLINIC | Age: 72
End: 2022-12-07

## 2022-12-07 ENCOUNTER — TELEPHONE (OUTPATIENT)
Dept: FAMILY MEDICINE CLINIC | Facility: CLINIC | Age: 72
End: 2022-12-07

## 2022-12-07 DIAGNOSIS — I69.30 HISTORY OF CVA WITH RESIDUAL DEFICIT: ICD-10-CM

## 2022-12-07 DIAGNOSIS — R73.03 PREDIABETES: ICD-10-CM

## 2022-12-07 DIAGNOSIS — R32 INCONTINENCE OF URINE IN FEMALE: ICD-10-CM

## 2022-12-07 DIAGNOSIS — R32 INCONTINENCE OF URINE IN FEMALE: Primary | ICD-10-CM

## 2022-12-07 DIAGNOSIS — K21.9 GASTROESOPHAGEAL REFLUX DISEASE WITHOUT ESOPHAGITIS: ICD-10-CM

## 2022-12-07 DIAGNOSIS — E78.1 PURE HYPERTRIGLYCERIDEMIA: ICD-10-CM

## 2022-12-07 LAB
BACTERIA UR QL AUTO: ABNORMAL /HPF
BILIRUB UR QL STRIP: NEGATIVE
CLARITY UR: ABNORMAL
COLOR UR: ABNORMAL
GLUCOSE UR STRIP-MCNC: NEGATIVE MG/DL
HGB UR QL STRIP.AUTO: NEGATIVE
KETONES UR STRIP-MCNC: NEGATIVE MG/DL
LEUKOCYTE ESTERASE UR QL STRIP: NEGATIVE
MUCOUS THREADS UR QL AUTO: ABNORMAL
NITRITE UR QL STRIP: NEGATIVE
NON-SQ EPI CELLS URNS QL MICRO: ABNORMAL /HPF
PH UR STRIP.AUTO: 5.5 [PH]
PROT UR STRIP-MCNC: NEGATIVE MG/DL
RBC #/AREA URNS AUTO: ABNORMAL /HPF
SP GR UR STRIP.AUTO: 1.02 (ref 1–1.03)
UROBILINOGEN UR STRIP-ACNC: <2 MG/DL
WBC #/AREA URNS AUTO: ABNORMAL /HPF

## 2022-12-07 NOTE — TELEPHONE ENCOUNTER
We should probably check another urine  We can enter urinalysis and culture for her to get done at the labs

## 2022-12-07 NOTE — TELEPHONE ENCOUNTER
Patient reports leakage  Chevy Ang that she had a UTI back in November  Was on Macrobid and leakage completely stopped  Now that she has completed the medication, she began leaking again  Is wondering if she can take anything else for this

## 2022-12-10 LAB
BACTERIA UR CULT: NORMAL
BACTERIA UR CULT: NORMAL

## 2022-12-13 ENCOUNTER — OFFICE VISIT (OUTPATIENT)
Dept: FAMILY MEDICINE CLINIC | Facility: CLINIC | Age: 72
End: 2022-12-13

## 2022-12-13 VITALS — WEIGHT: 173 LBS | HEIGHT: 63 IN | BODY MASS INDEX: 30.65 KG/M2

## 2022-12-13 DIAGNOSIS — Z00.00 MEDICARE ANNUAL WELLNESS VISIT, SUBSEQUENT: Primary | ICD-10-CM

## 2022-12-13 NOTE — PATIENT INSTRUCTIONS
Medicare Preventive Visit Patient Instructions  Thank you for completing your Welcome to Medicare Visit or Medicare Annual Wellness Visit today  Your next wellness visit will be due in one year (12/14/2023)  The screening/preventive services that you may require over the next 5-10 years are detailed below  Some tests may not apply to you based off risk factors and/or age  Screening tests ordered at today's visit but not completed yet may show as past due  Also, please note that scanned in results may not display below  Preventive Screenings:  Service Recommendations Previous Testing/Comments   Colorectal Cancer Screening  * Colonoscopy    * Fecal Occult Blood Test (FOBT)/Fecal Immunochemical Test (FIT)  * Fecal DNA/Cologuard Test  * Flexible Sigmoidoscopy Age: 39-70 years old   Colonoscopy: every 10 years (may be performed more frequently if at higher risk)  OR  FOBT/FIT: every 1 year  OR  Cologuard: every 3 years  OR  Sigmoidoscopy: every 5 years  Screening may be recommended earlier than age 39 if at higher risk for colorectal cancer  Also, an individualized decision between you and your healthcare provider will decide whether screening between the ages of 74-80 would be appropriate  Colonoscopy: 12/17/2021  FOBT/FIT: 09/02/2020  Cologuard: 12/17/2021  Sigmoidoscopy: Not on file    Screening Current     Breast Cancer Screening Age: 36 years old  Frequency: every 1-2 years  Not required if history of left and right mastectomy Mammogram: 12/13/2021    Screening Current   Cervical Cancer Screening Between the ages of 21-29, pap smear recommended once every 3 years  Between the ages of 33-67, can perform pap smear with HPV co-testing every 5 years     Recommendations may differ for women with a history of total hysterectomy, cervical cancer, or abnormal pap smears in past  Pap Smear: Not on file    Screening Not Indicated   Hepatitis C Screening Once for adults born between 1945 and 1965  More frequently in patients at high risk for Hepatitis C Hep C Antibody: Not on file        Diabetes Screening 1-2 times per year if you're at risk for diabetes or have pre-diabetes Fasting glucose: 92 mg/dL (5/3/2021)  A1C: 5 8 % (10/26/2022)  Screening Current   Cholesterol Screening Once every 5 years if you don't have a lipid disorder  May order more often based on risk factors  Lipid panel: 10/26/2022    Screening Not Indicated  History Lipid Disorder     Other Preventive Screenings Covered by Medicare:  1  Abdominal Aortic Aneurysm (AAA) Screening: covered once if your at risk  You're considered to be at risk if you have a family history of AAA  2  Lung Cancer Screening: covers low dose CT scan once per year if you meet all of the following conditions: (1) Age 50-69; (2) No signs or symptoms of lung cancer; (3) Current smoker or have quit smoking within the last 15 years; (4) You have a tobacco smoking history of at least 20 pack years (packs per day multiplied by number of years you smoked); (5) You get a written order from a healthcare provider  3  Glaucoma Screening: covered annually if you're considered high risk: (1) You have diabetes OR (2) Family history of glaucoma OR (3)  aged 48 and older OR (3)  American aged 72 and older  3  Osteoporosis Screening: covered every 2 years if you meet one of the following conditions: (1) You're estrogen deficient and at risk for osteoporosis based off medical history and other findings; (2) Have a vertebral abnormality; (3) On glucocorticoid therapy for more than 3 months; (4) Have primary hyperparathyroidism; (5) On osteoporosis medications and need to assess response to drug therapy  · Last bone density test (DXA Scan): 12/13/2021   5  HIV Screening: covered annually if you're between the age of 15-65  Also covered annually if you are younger than 13 and older than 72 with risk factors for HIV infection   For pregnant patients, it is covered up to 3 times per pregnancy  Immunizations:  Immunization Recommendations   Influenza Vaccine Annual influenza vaccination during flu season is recommended for all persons aged >= 6 months who do not have contraindications   Pneumococcal Vaccine   * Pneumococcal conjugate vaccine = PCV13 (Prevnar 13), PCV15 (Vaxneuvance), PCV20 (Prevnar 20)  * Pneumococcal polysaccharide vaccine = PPSV23 (Pneumovax) Adults 25-60 years old: 1-3 doses may be recommended based on certain risk factors  Adults 72 years old: 1-2 doses may be recommended based off what pneumonia vaccine you previously received   Hepatitis B Vaccine 3 dose series if at intermediate or high risk (ex: diabetes, end stage renal disease, liver disease)   Tetanus (Td) Vaccine - COST NOT COVERED BY MEDICARE PART B Following completion of primary series, a booster dose should be given every 10 years to maintain immunity against tetanus  Td may also be given as tetanus wound prophylaxis  Tdap Vaccine - COST NOT COVERED BY MEDICARE PART B Recommended at least once for all adults  For pregnant patients, recommended with each pregnancy  Shingles Vaccine (Shingrix) - COST NOT COVERED BY MEDICARE PART B  2 shot series recommended in those aged 48 and above     Health Maintenance Due:      Topic Date Due   • Hepatitis C Screening  Never done   • Breast Cancer Screening: Mammogram  12/13/2022   • Lung Cancer Screening  11/14/2023   • Colorectal Cancer Screening  12/17/2024     Immunizations Due:      Topic Date Due   • Hepatitis B Vaccine (1 of 3 - 3-dose series) Never done   • COVID-19 Vaccine (1) Never done     Advance Directives   What are advance directives? Advance directives are legal documents that state your wishes and plans for medical care  These plans are made ahead of time in case you lose your ability to make decisions for yourself  Advance directives can apply to any medical decision, such as the treatments you want, and if you want to donate organs     What are the types of advance directives? There are many types of advance directives, and each state has rules about how to use them  You may choose a combination of any of the following:  · Living will: This is a written record of the treatment you want  You can also choose which treatments you do not want, which to limit, and which to stop at a certain time  This includes surgery, medicine, IV fluid, and tube feedings  · Durable power of  for healthcare [facility]): This is a written record that states who you want to make healthcare choices for you when you are unable to make them for yourself  This person, called a proxy, is usually a family member or a friend  You may choose more than 1 proxy  · Do not resuscitate (DNR) order:  A DNR order is used in case your heart stops beating or you stop breathing  It is a request not to have certain forms of treatment, such as CPR  A DNR order may be included in other types of advance directives  · Medical directive: This covers the care that you want if you are in a coma, near death, or unable to make decisions for yourself  You can list the treatments you want for each condition  Treatment may include pain medicine, surgery, blood transfusions, dialysis, IV or tube feedings, and a ventilator (breathing machine)  · Values history: This document has questions about your views, beliefs, and how you feel and think about life  This information can help others choose the care that you would choose  Why are advance directives important? An advance directive helps you control your care  Although spoken wishes may be used, it is better to have your wishes written down  Spoken wishes can be misunderstood, or not followed  Treatments may be given even if you do not want them  An advance directive may make it easier for your family to make difficult choices about your care  Urinary Incontinence   Urinary incontinence (UI)  is when you lose control of your bladder   UI develops because your bladder cannot store or empty urine properly  The 3 most common types of UI are stress incontinence, urge incontinence, or both  Medicines:   · May be given to help strengthen your bladder control  Report any side effects of medication to your healthcare provider  Do pelvic muscle exercises often:  Your pelvic muscles help you stop urinating  Squeeze these muscles tight for 5 seconds, then relax for 5 seconds  Gradually work up to squeezing for 10 seconds  Do 3 sets of 15 repetitions a day, or as directed  This will help strengthen your pelvic muscles and improve bladder control  Train your bladder:  Go to the bathroom at set times, such as every 2 hours, even if you do not feel the urge to go  You can also try to hold your urine when you feel the urge to go  For example, hold your urine for 5 minutes when you feel the urge to go  As that becomes easier, hold your urine for 10 minutes  Self-care:   · Keep a UI record  Write down how often you leak urine and how much you leak  Make a note of what you were doing when you leaked urine  · Drink liquids as directed  You may need to limit the amount of liquid you drink to help control your urine leakage  Do not drink any liquid right before you go to bed  Limit or do not have drinks that contain caffeine or alcohol  · Prevent constipation  Eat a variety of high-fiber foods  Good examples are high-fiber cereals, beans, vegetables, and whole-grain breads  Walking is the best way to trigger your intestines to have a bowel movement  · Exercise regularly and maintain a healthy weight  Weight loss and exercise will decrease pressure on your bladder and help you control your leakage  · Use a catheter as directed  to help empty your bladder  A catheter is a tiny, plastic tube that is put into your bladder to drain your urine  · Go to behavior therapy as directed    Behavior therapy may be used to help you learn to control your urge to urinate  Weight Management   Why it is important to manage your weight:  Being overweight increases your risk of health conditions such as heart disease, high blood pressure, type 2 diabetes, and certain types of cancer  It can also increase your risk for osteoarthritis, sleep apnea, and other respiratory problems  Aim for a slow, steady weight loss  Even a small amount of weight loss can lower your risk of health problems  How to lose weight safely:  A safe and healthy way to lose weight is to eat fewer calories and get regular exercise  You can lose up about 1 pound a week by decreasing the number of calories you eat by 500 calories each day  Healthy meal plan for weight management:  A healthy meal plan includes a variety of foods, contains fewer calories, and helps you stay healthy  A healthy meal plan includes the following:  · Eat whole-grain foods more often  A healthy meal plan should contain fiber  Fiber is the part of grains, fruits, and vegetables that is not broken down by your body  Whole-grain foods are healthy and provide extra fiber in your diet  Some examples of whole-grain foods are whole-wheat breads and pastas, oatmeal, brown rice, and bulgur  · Eat a variety of vegetables every day  Include dark, leafy greens such as spinach, kale, donal greens, and mustard greens  Eat yellow and orange vegetables such as carrots, sweet potatoes, and winter squash  · Eat a variety of fruits every day  Choose fresh or canned fruit (canned in its own juice or light syrup) instead of juice  Fruit juice has very little or no fiber  · Eat low-fat dairy foods  Drink fat-free (skim) milk or 1% milk  Eat fat-free yogurt and low-fat cottage cheese  Try low-fat cheeses such as mozzarella and other reduced-fat cheeses  · Choose meat and other protein foods that are low in fat  Choose beans or other legumes such as split peas or lentils   Choose fish, skinless poultry (chicken or turkey), or lean cuts of red meat (beef or pork)  Before you cook meat or poultry, cut off any visible fat  · Use less fat and oil  Try baking foods instead of frying them  Add less fat, such as margarine, sour cream, regular salad dressing and mayonnaise to foods  Eat fewer high-fat foods  Some examples of high-fat foods include french fries, doughnuts, ice cream, and cakes  · Eat fewer sweets  Limit foods and drinks that are high in sugar  This includes candy, cookies, regular soda, and sweetened drinks  Exercise:  Exercise at least 30 minutes per day on most days of the week  Some examples of exercise include walking, biking, dancing, and swimming  You can also fit in more physical activity by taking the stairs instead of the elevator or parking farther away from stores  Ask your healthcare provider about the best exercise plan for you  © Copyright [employer] 2018 Information is for End User's use only and may not be sold, redistributed or otherwise used for commercial purposes   All illustrations and images included in CareNotes® are the copyrighted property of [employer]  or [address] [place] [address]

## 2022-12-13 NOTE — PROGRESS NOTES
Assessment and Plan:     Problem List Items Addressed This Visit    None  Visit Diagnoses     Medicare annual wellness visit, subsequent    -  Primary        BMI Counseling: Body mass index is 30 65 kg/m²  The BMI is above normal  Nutrition recommendations include encouraging healthy choices of fruits and vegetables, consuming healthier snacks, limiting drinks that contain sugar, moderation in carbohydrate intake, reducing intake of saturated and trans fat and reducing intake of cholesterol  Exercise recommendations include exercising 3-5 times per week  Rationale for BMI follow-up plan is due to patient being overweight or obese  Urinary Incontinence Plan of Care: counseling topics discussed: limit alcohol, caffeine, spicy foods, and acidic foods  Preventive health issues were discussed with patient, and age appropriate screening tests were ordered as noted in patient's After Visit Summary  Personalized health advice and appropriate referrals for health education or preventive services given if needed, as noted in patient's After Visit Summary       History of Present Illness:     Patient presents for a Medicare Wellness Visit      Patient Care Team:  Edward Souza as PCP - General (Family Medicine)  Lia Rodriges, DO as PCP - [address] [place] [address] (RTE)  L[facility]         Problem List:     Patient Active Problem List   Diagnosis   • Depression, recurrent ([facility] [address] )   • Seasonal allergies   • Primary gall bladder adenocarcinoma ([facility] [address] )   • Pure hypertriglyceridemia   • Diverticulosis   • Murmur   • SOLORZANO (dyspnea on exertion)   • COPD, group A, by GOLD 2017 classification ([facility] [address] )   • History of CVA with residual deficit   • GERD (gastroesophageal reflux disease)   • Glaucoma   • Headache      Past Medical and Surgical History:     Past Medical History:   Diagnosis Date   • Cancer [facility])     gallbladder   • COVID-19    • GERD (gastroesophageal reflux disease)    • Glaucoma    • History of gallbladder cancer     Approx  3 years ago   • Hyperlipemia    • Lightheadedness 5/3/2021   • Murmur    • Rheumatic fever    • RUQ abdominal pain 5/3/2021     Past Surgical History:   Procedure Laterality Date   • CHOLECYSTECTOMY     • TONSILLECTOMY  CHILDHOOD      Family History:     Family History   Problem Relation Age of Onset   • Heart attack Mother    • Kidney disease Father    • Breast cancer Sister 77   • No Known Problems Daughter    • No Known Problems Maternal Grandmother    • No Known Problems Paternal Grandmother    • No Known Problems Sister    • No Known Problems Maternal Aunt    • No Known Problems Maternal Aunt    • No Known Problems Maternal Aunt    • No Known Problems Paternal Aunt    • No Known Problems Paternal Aunt       Social History:     Social History     Socioeconomic History   • Marital status: /Civil Union     Spouse name: None   • Number of children: None   • Years of education: None   • Highest education level: None   Occupational History   • None   Tobacco Use   • Smoking status: Former     Packs/day: 1 00     Years: 35 00     Pack years: 35 00     Types: Cigarettes     Start date: 3/10/1972     Quit date: 3/10/2012     Years since quitting: 10 7   • Smokeless tobacco: Never   Vaping Use   • Vaping Use: Never used   Substance and Sexual Activity   • Alcohol use: Never   • Drug use: Never   • Sexual activity: None   Other Topics Concern   • None   Social History Narrative   • None     Social Determinants of Health     Financial Resource Strain: Low Risk    • Difficulty of Paying Living Expenses: Not hard at all   Food Insecurity: Not on file   Transportation Needs: No Transportation Needs   • Lack of Transportation (Medical): No   • Lack of Transportation (Non-Medical):  No   Physical Activity: Not on file   Stress: Not on file   Social Connections: Not on file   Intimate Partner Violence: Not on file   Housing Stability: Not on file      Medications and Allergies:     Current Outpatient Medications   Medication Sig Dispense Refill   • albuterol (PROVENTIL HFA,VENTOLIN HFA) 90 mcg/act inhaler Inhale 2 puffs every 6 (six) hours as needed for wheezing or shortness of breath 18 g 0   • aspirin (ECOTRIN LOW STRENGTH) 81 mg EC tablet Take 1 tablet (81 mg total) by mouth daily 90 tablet 0   • latanoprost (XALATAN) 0 005 % ophthalmic solution Apply to eye     • magnesium Oxide (MAG-OX) 400 mg TABS Take 1 tablet (400 mg total) by mouth 2 (two) times a day 90 tablet 3   • meclizine (ANTIVERT) 25 mg tablet Take 1 tablet (25 mg total) by mouth 3 (three) times a day as needed for dizziness 30 tablet 0   • mupirocin (BACTROBAN) 2 % ointment Apply topically 3 (three) times a day 22 g 0   • omeprazole (PriLOSEC) 20 mg delayed release capsule Take 1 capsule (20 mg total) by mouth daily before breakfast 90 capsule 3   • Promethazine-DM (PHENERGAN-DM) 6 25-15 mg/5 mL oral syrup Take 5 mL by mouth 4 (four) times a day as needed for cough 240 mL 1   • Riboflavin 400 MG TABS Take 1 tablet (400 mg total) by mouth 2 (two) times a day 180 tablet 0   • Spiriva Respimat 2 5 MCG/ACT AERS inhaler  (Patient not taking: Reported on 9/21/2021)       No current facility-administered medications for this visit       Allergies   Allergen Reactions   • Ciprofloxacin    • Clindamycin    • Doxycycline    • Fluticasone-Salmeterol Other (See Comments)     Leg cramps   • Keflex [Cephalexin]    • Naproxen Hives and Other (See Comments)     "hives & chest pain"    • Nsaids    • Penicillins    • Sulfa Antibiotics Hives, Itching and Swelling   • Lipitor [Atorvastatin] Myalgia      Immunizations:     Immunization History   Administered Date(s) Administered   • INFLUENZA 09/14/2017   • Tdap 04/06/2021      Health Maintenance:         Topic Date Due   • Hepatitis C Screening  Never done   • Breast Cancer Screening: Mammogram  12/13/2022   • Lung Cancer Screening  11/14/2023   • Colorectal Cancer Screening 12/17/2024         Topic Date Due   • Hepatitis B Vaccine (1 of 3 - 3-dose series) Never done   • COVID-19 Vaccine (1) Never done      Medicare Screening Tests and Risk Assessments:     Pretty Fletcher is here for her Subsequent Wellness visit  Last Medicare Wellness visit information reviewed, patient interviewed, no change since last AWV  Health Risk Assessment:   Patient rates overall health as good  Patient feels that their physical health rating is same  Patient is satisfied with their life  Eyesight was rated as same  Hearing was rated as same  Patient feels that their emotional and mental health rating is slightly worse  Patients states they are never, rarely angry  Patient states they are never, rarely unusually tired/fatigued  Pain experienced in the last 7 days has been none  Patient states that she has experienced no weight loss or gain in last 6 months  Depression Screening:   PHQ-9 Score: 0      Fall Risk Screening: In the past year, patient has experienced: no history of falling in past year      Urinary Incontinence Screening:   Patient has leaked urine accidently in the last six months  Home Safety:  Patient does not have trouble with stairs inside or outside of their home  Patient has working smoke alarms and has working carbon monoxide detector  Home safety hazards include: none  Nutrition:   Current diet is Regular  Medications:   Patient is not currently taking any over-the-counter supplements  Patient is able to manage medications  Activities of Daily Living (ADLs)/Instrumental Activities of Daily Living (IADLs):   Walk and transfer into and out of bed and chair?: Yes  Dress and groom yourself?: Yes    Bathe or shower yourself?: Yes    Feed yourself?  Yes  Do your laundry/housekeeping?: Yes  Manage your money, pay your bills and track your expenses?: Yes  Make your own meals?: Yes    Do your own shopping?: Yes    Previous Hospitalizations:   Any hospitalizations or ED visits within the last 12 months?: No      Advance Care Planning:   Living will: No    Durable POA for healthcare: No    Advanced directive: No      Cognitive Screening:   Provider or family/friend/caregiver concerned regarding cognition?: No    PREVENTIVE SCREENINGS      Cardiovascular Screening:    General: Screening Not Indicated, History Lipid Disorder and Screening Current      Diabetes Screening:     General: Screening Current      Colorectal Cancer Screening:     General: Screening Current      Breast Cancer Screening:     General: Screening Current      Cervical Cancer Screening:    General: Screening Not Indicated      Osteoporosis Screening:    General: Screening Current      Lung Cancer Screening:     General: Screening Current    Screening, Brief Intervention, and Referral to Treatment (SBIRT)    Screening  Typical number of drinks in a day: 0  Typical number of drinks in a week: 0  Interpretation: Low risk drinking behavior  Single Item Drug Screening:  How often have you used an illegal drug (including marijuana) or a prescription medication for non-medical reasons in the past year? never    Single Item Drug Screen Score: 0  Interpretation: Negative screen for possible drug use disorder    No results found       Physical Exam:     Ht 5' 3" (1 6 m)   Wt 78 5 kg (173 lb)   BMI 30 65 kg/m²         PAYAL Laurent

## 2022-12-15 ENCOUNTER — HOSPITAL ENCOUNTER (OUTPATIENT)
Dept: MAMMOGRAPHY | Facility: HOSPITAL | Age: 72
End: 2022-12-15

## 2022-12-15 VITALS — BODY MASS INDEX: 29.59 KG/M2 | HEIGHT: 63 IN | WEIGHT: 167 LBS

## 2022-12-15 DIAGNOSIS — Z12.31 ENCOUNTER FOR SCREENING MAMMOGRAM FOR MALIGNANT NEOPLASM OF BREAST: ICD-10-CM

## 2023-01-09 ENCOUNTER — TELEPHONE (OUTPATIENT)
Dept: FAMILY MEDICINE CLINIC | Facility: CLINIC | Age: 73
End: 2023-01-09

## 2023-01-31 ENCOUNTER — OFFICE VISIT (OUTPATIENT)
Dept: FAMILY MEDICINE CLINIC | Facility: CLINIC | Age: 73
End: 2023-01-31

## 2023-01-31 VITALS
DIASTOLIC BLOOD PRESSURE: 76 MMHG | HEART RATE: 64 BPM | OXYGEN SATURATION: 99 % | SYSTOLIC BLOOD PRESSURE: 126 MMHG | TEMPERATURE: 98.4 F | HEIGHT: 63 IN | BODY MASS INDEX: 30.65 KG/M2 | WEIGHT: 173 LBS | RESPIRATION RATE: 18 BRPM

## 2023-01-31 DIAGNOSIS — J44.9 COPD, GROUP A, BY GOLD 2017 CLASSIFICATION (HCC): ICD-10-CM

## 2023-01-31 DIAGNOSIS — J01.10 ACUTE NON-RECURRENT FRONTAL SINUSITIS: Primary | ICD-10-CM

## 2023-01-31 DIAGNOSIS — F33.9 DEPRESSION, RECURRENT (HCC): ICD-10-CM

## 2023-01-31 RX ORDER — PREDNISONE 20 MG/1
TABLET ORAL
Qty: 12 TABLET | Refills: 0 | Status: SHIPPED | OUTPATIENT
Start: 2023-01-31 | End: 2023-02-08

## 2023-01-31 RX ORDER — AMOXICILLIN AND CLAVULANATE POTASSIUM 875; 125 MG/1; MG/1
1 TABLET, FILM COATED ORAL EVERY 12 HOURS SCHEDULED
Qty: 14 TABLET | Refills: 0 | Status: SHIPPED | OUTPATIENT
Start: 2023-01-31 | End: 2023-02-07

## 2023-01-31 NOTE — PROGRESS NOTES
[address] [place] [address] Primary Care        NAME: Abilio Prakash is a 71 y o  female  : 1950    MRN: [id number]  DATE: 2023  TIME: 12:26 PM    Assessment and Plan   Acute non-recurrent frontal sinusitis [J01 10]  1  Acute non-recurrent frontal sinusitis  amoxicillin-clavulanate (AUGMENTIN) 875-125 mg per tablet    predniSONE 20 mg tablet      2  Primary gall bladder adenocarcinoma ([facility] )        3  COPD, group A, by GOLD 2017 classification ([facility] )        4  Depression, recurrent ([facility] )          1  Acute non-recurrent frontal sinusitis   2 weeks of symptoms will treat with antibiotics and prednisone  - amoxicillin-clavulanate (AUGMENTIN) 875-125 mg per tablet; Take 1 tablet by mouth every 12 (twelve) hours for 7 days  Dispense: 14 tablet; Refill: 0  - predniSONE 20 mg tablet; Take 2 tablets (40 mg total) by mouth daily for 4 days, THEN 1 tablet (20 mg total) daily for 4 days  Dispense: 12 tablet; Refill: 0    3  COPD, group A, by GOLD 2017 classification ([facility] )      4  Depression, recurrent [facility]        Patient Instructions     There are no Patient Instructions on file for this visit  Chief Complaint     Chief Complaint   Patient presents with   • URI     Over 2 weeks with a cold  Feels very congested  History of Present Illness       Patient here for acute visit with c/o  URI symptoms x 2 weeks  Has tried decongestant, cold and flu, nasal spray, mucinex, saline with nor relief  Nostrils feel clogged, facial pressure, headaches, bring through mouth at night which is drying her tongue and mouth out, fatigue  Denies any sick contacts  Review of Systems   Review of Systems   Constitutional: Negative  Negative for chills, fatigue and fever  Respiratory: Negative  Negative for chest tightness, shortness of breath and wheezing  Cardiovascular: Negative  Negative for chest pain and palpitations  Gastrointestinal: Negative  Negative for constipation, diarrhea and nausea  Skin: Negative  Negative for rash  Neurological: Negative  Negative for dizziness, light-headedness and headaches  Hematological: Negative  Negative for adenopathy  Psychiatric/Behavioral: Negative  Negative for dysphoric mood  The patient is not nervous/anxious  PHQ-2/9 Depression Screening    Little interest or pleasure in doing things: 0 - not at all  Feeling down, depressed, or hopeless: 0 - not at all  Trouble falling or staying asleep, or sleeping too much: 0 - not at all  Feeling tired or having little energy: 0 - not at all  Poor appetite or overeatin - not at all  Feeling bad about yourself - or that you are a failure or have let yourself or your family down: 0 - not at all  Trouble concentrating on things, such as reading the newspaper or watching television: 0 - not at all  Moving or speaking so slowly that other people could have noticed   Or the opposite - being so fidgety or restless that you have been moving around a lot more than usual: 0 - not at all  Thoughts that you would be better off dead, or of hurting yourself in some way: 0 - not at all  PHQ-9 Score: 0   PHQ-9 Interpretation: No or Minimal depression         Current Medications       Current Outpatient Medications:   •  albuterol (PROVENTIL HFA,VENTOLIN HFA) 90 mcg/act inhaler, Inhale 2 puffs every 6 (six) hours as needed for wheezing or shortness of breath, Disp: 18 g, Rfl: 0  •  amoxicillin-clavulanate (AUGMENTIN) 875-125 mg per tablet, Take 1 tablet by mouth every 12 (twelve) hours for 7 days, Disp: 14 tablet, Rfl: 0  •  aspirin (ECOTRIN LOW STRENGTH) 81 mg EC tablet, Take 1 tablet (81 mg total) by mouth daily, Disp: 90 tablet, Rfl: 0  •  latanoprost (XALATAN) 0 005 % ophthalmic solution, Apply to eye, Disp: , Rfl:   •  magnesium Oxide (MAG-OX) 400 mg TABS, Take 1 tablet (400 mg total) by mouth 2 (two) times a day, Disp: 90 tablet, Rfl: 3  •  meclizine (ANTIVERT) 25 mg tablet, Take 1 tablet (25 mg total) by mouth 3 (three) times a day as needed for dizziness, Disp: 30 tablet, Rfl: 0  •  mupirocin (BACTROBAN) 2 % ointment, Apply topically 3 (three) times a day, Disp: 22 g, Rfl: 0  •  omeprazole (PriLOSEC) 20 mg delayed release capsule, Take 1 capsule (20 mg total) by mouth daily before breakfast, Disp: 90 capsule, Rfl: 3  •  predniSONE 20 mg tablet, Take 2 tablets (40 mg total) by mouth daily for 4 days, THEN 1 tablet (20 mg total) daily for 4 days  , Disp: 12 tablet, Rfl: 0  •  Riboflavin 400 MG TABS, Take 1 tablet (400 mg total) by mouth 2 (two) times a day, Disp: 180 tablet, Rfl: 0  •  Spiriva Respimat 2 5 MCG/ACT AERS inhaler, , Disp: , Rfl:     Current Allergies     Allergies as of 01/31/2023 - Reviewed 01/31/2023   Allergen Reaction Noted   • Ciprofloxacin  02/03/2020   • Clindamycin  02/03/2020   • Fluticasone-salmeterol Other (See Comments) 10/13/2020   • Keflex [cephalexin]  02/03/2020   • Naproxen Hives and Other (See Comments) 08/03/2017   • Nsaids  10/04/2020   • Sulfa antibiotics Hives, Itching, and Swelling 08/03/2017   • Lipitor [atorvastatin] Myalgia 10/26/2022            The following portions of the patient's history were reviewed and updated as appropriate: allergies, current medications, past family history, past medical history, past social history, past surgical history and problem list      Past Medical History:   Diagnosis Date   • Cancer ([facility] )     gallbladder   • COVID-19    • GERD (gastroesophageal reflux disease)    • Glaucoma    • History of gallbladder cancer     Approx   3 years ago   • Hyperlipemia    • Lightheadedness 5/3/2021   • Murmur    • Rheumatic fever    • RUQ abdominal pain 5/3/2021       Past Surgical History:   Procedure Laterality Date   • CHOLECYSTECTOMY     • TONSILLECTOMY  CHILDHOOD       Family History   Problem Relation Age of Onset   • Heart attack Mother    • Kidney disease Father    • Breast cancer Sister 77   • No Known Problems Daughter    • No Known Problems Maternal Grandmother    • No Known Problems Paternal Grandmother    • No Known Problems Sister    • No Known Problems Maternal Aunt    • No Known Problems Maternal Aunt    • No Known Problems Maternal Aunt    • No Known Problems Paternal Aunt    • No Known Problems Paternal Aunt          Medications have been verified  Objective   /76   Pulse 64   Temp 98 4 °F (36 9 °C)   Resp 18   Ht 5' 3" (1 6 m)   Wt 78 5 kg (173 lb)   SpO2 99%   BMI 30 65 kg/m²        Physical Exam     Physical Exam  Vitals and nursing note reviewed  Constitutional:       General: She is not in acute distress  Appearance: Normal appearance  She is well-developed  She is not ill-appearing  HENT:      Head: Normocephalic and atraumatic  Eyes:      General: Lids are normal    Cardiovascular:      Rate and Rhythm: Normal rate and regular rhythm  Heart sounds: Normal heart sounds, S1 normal and S2 normal  No murmur heard  Pulmonary:      Effort: Pulmonary effort is normal  No respiratory distress  Breath sounds: Normal breath sounds  No decreased breath sounds or wheezing  Musculoskeletal:         General: No tenderness or deformity  Normal range of motion  Skin:     General: Skin is warm  Findings: No erythema or rash  Neurological:      Mental Status: She is alert and oriented to person, place, and time  Psychiatric:         Behavior: Behavior normal  Behavior is cooperative  Thought Content:  Thought content normal

## 2023-02-19 ENCOUNTER — OFFICE VISIT (OUTPATIENT)
Dept: URGENT CARE | Facility: CLINIC | Age: 73
End: 2023-02-19

## 2023-02-19 VITALS
DIASTOLIC BLOOD PRESSURE: 65 MMHG | HEART RATE: 84 BPM | BODY MASS INDEX: 30.87 KG/M2 | TEMPERATURE: 97.3 F | HEIGHT: 63 IN | SYSTOLIC BLOOD PRESSURE: 137 MMHG | OXYGEN SATURATION: 95 % | RESPIRATION RATE: 20 BRPM | WEIGHT: 174.2 LBS

## 2023-02-19 DIAGNOSIS — R50.9 FEVER, UNSPECIFIED FEVER CAUSE: ICD-10-CM

## 2023-02-19 DIAGNOSIS — J06.9 ACUTE URI: Primary | ICD-10-CM

## 2023-02-19 LAB
SARS-COV-2 AG UPPER RESP QL IA: NEGATIVE
VALID CONTROL: NORMAL

## 2023-02-19 RX ORDER — ACYCLOVIR 50 MG/G
1 OINTMENT TOPICAL
COMMUNITY
Start: 2022-11-29

## 2023-02-19 NOTE — PROGRESS NOTES
[address] [facility]    NAME: Michaela Tolbert is a 71 y o  female  : 1950    MRN: [id number]  DATE: 2023  TIME: 6:10 PM    Assessment and Plan   Acute URI [J06 9]  1  Acute URI  Cov/Flu-[facility] at [facility]      2  Fever, unspecified fever cause  Poct Covid 19 Rapid Antigen Test    Cov/Flu-Collected at [facility]          Patient Instructions     Patient Instructions   Quarantine until test results are back   Vitamin D3 2000 IU daily  Vitamin C 1g every 12 hours  Multivitamin daily  Fluids and rest  Flonase 2 sprays each nostril once daily  Zyrtec   Over the counter cold medication as needed such as mucinex  Ibuprofen and/or tylenol for fever, body aches  Follow up with PCP upon confirmation of a positive covid test   Proceed to  ER if symptoms worsen  Stay home except to get medical care    People who are mildly ill with COVID-19 are able to isolate at home during their illness  You should restrict activities outside your home, except for getting medical care  Do not go to work, school, or public areas  Avoid using public transportation, ride-sharing, or taxis  Separate yourself from other people     As much as possible, you should stay in a specific room and away from other people in your home  Also, you should use a separate bathroom, if available  Call ahead before visiting your doctor    If you have a medical appointment, call the healthcare provider and tell them that you have or may have COVID-19  This will help the healthcare provider’s office take steps to keep other people from getting infected or exposed  Wear a facemask    You should wear a facemask when you are around other people (e g , sharing a room or vehicle) or pets and before you enter a healthcare provider’s office   If you are not able to wear a facemask (for example, because it causes trouble breathing), then people who live with you should not stay in the same room with you, or they should wear a facemask if they enter your room  Monitor your symptoms    Seek prompt medical attention if your illness is worsening (e g , difficulty breathing)  Before seeking care, call your healthcare provider and tell them that you have, or are being evaluated for, COVID-19  Put on a facemask before you enter the facility  These steps will help the healthcare provider’s office to keep other people in the office or waiting room from getting infected or exposed  Ask your healthcare provider to call the local or [facility] health department  Persons who are placed under active monitoring or facilitated self-monitoring should follow instructions provided by their local health department or occupational health professionals, as appropriate  If you have a medical emergency and need to call 911, notify the dispatch personnel that you have, or are being evaluated for COVID-19  If possible, put on a facemask before emergency medical services arrive  Please follow Quarantine instructions provided in your work or school note  Chief Complaint     Chief Complaint   Patient presents with   • Nasal Congestion     For past 3days , sinus congestion, cant get any mucus out    • Earache     Right ear pain for 2 days       History of Present Illness   71year old female here with complaint of nasal congestion, right ear ache, fever and excess sleeping over the past 2-3 days  States that she can't stay awake  She has a lot of mucous behind her nose and in the back of her throat and just can't get it out  Taking mucinex, flonase  Using saline rinses with minimal relief  Review of Systems   Review of Systems   Constitutional: Positive for fatigue and fever  Negative for appetite change and chills  HENT: Positive for congestion, ear pain and sinus pressure  Negative for ear discharge, facial swelling, postnasal drip, sneezing and sore throat  Respiratory: Negative for cough, shortness of breath and wheezing  Neurological: Negative for headaches         Current Medications     Current Outpatient Medications:   •  albuterol (PROVENTIL HFA,VENTOLIN HFA) 90 mcg/act inhaler, Inhale 2 puffs every 6 (six) hours as needed for wheezing or shortness of breath, Disp: 18 g, Rfl: 0  •  aspirin (ECOTRIN LOW STRENGTH) 81 mg EC tablet, Take 1 tablet (81 mg total) by mouth daily, Disp: 90 tablet, Rfl: 0  •  latanoprost (XALATAN) 0 005 % ophthalmic solution, Apply to eye, Disp: , Rfl:   •  magnesium Oxide (MAG-OX) 400 mg TABS, Take 1 tablet (400 mg total) by mouth 2 (two) times a day, Disp: 90 tablet, Rfl: 3  •  meclizine (ANTIVERT) 25 mg tablet, Take 1 tablet (25 mg total) by mouth 3 (three) times a day as needed for dizziness, Disp: 30 tablet, Rfl: 0  •  mupirocin (BACTROBAN) 2 % ointment, Apply topically 3 (three) times a day, Disp: 22 g, Rfl: 0  •  omeprazole (PriLOSEC) 20 mg delayed release capsule, Take 1 capsule (20 mg total) by mouth daily before breakfast, Disp: 90 capsule, Rfl: 3  •  Riboflavin 400 MG TABS, Take 1 tablet (400 mg total) by mouth 2 (two) times a day, Disp: 180 tablet, Rfl: 0  •  Spiriva Respimat 2 5 MCG/ACT AERS inhaler, , Disp: , Rfl:   •  acyclovir (ZOVIRAX) 5 % ointment, Apply 1 application topically As directed (Patient not taking: Reported on 2/19/2023), Disp: , Rfl:     Current Allergies     Allergies as of 02/19/2023 - Reviewed 02/19/2023   Allergen Reaction Noted   • Ciprofloxacin  02/03/2020   • Clindamycin  02/03/2020   • Fluticasone-salmeterol Other (See Comments) 10/13/2020   • Keflex [cephalexin]  02/03/2020   • Naproxen Hives and Other (See Comments) 08/03/2017   • Nsaids  10/04/2020   • Sulfa antibiotics Hives, Itching, and Swelling 08/03/2017   • Atorvastatin Myalgia 10/26/2022          The following portions of the patient's history were reviewed and updated as appropriate: allergies, current medications, past family history, past medical history, past social history, past surgical history and problem list    Past Medical History:   Diagnosis Date   • Cancer ([facility] )     gallbladder   • COVID-19    • GERD (gastroesophageal reflux disease)    • Glaucoma    • History of gallbladder cancer     Approx  3 years ago   • Hyperlipemia    • Lightheadedness 5/3/2021   • Murmur    • Rheumatic fever    • RUQ abdominal pain 5/3/2021     Past Surgical History:   Procedure Laterality Date   • CHOLECYSTECTOMY     • TONSILLECTOMY  CHILDHOOD     Family History   Problem Relation Age of Onset   • Heart attack Mother    • Kidney disease Father    • Breast cancer Sister 77   • No Known Problems Daughter    • No Known Problems Maternal Grandmother    • No Known Problems Paternal Grandmother    • No Known Problems Sister    • No Known Problems Maternal Aunt    • No Known Problems Maternal Aunt    • No Known Problems Maternal Aunt    • No Known Problems Paternal Aunt    • No Known Problems Paternal Aunt      Social History     Socioeconomic History   • Marital status: /Civil Union     Spouse name: Not on file   • Number of children: Not on file   • Years of education: Not on file   • Highest education level: Not on file   Occupational History   • Not on file   Tobacco Use   • Smoking status: Former     Packs/day: 1 00     Years: 35 00     Pack years: 35 00     Types: Cigarettes     Start date: 3/10/1972     Quit date: 3/10/2012     Years since quitting: 10 9   • Smokeless tobacco: Never   Vaping Use   • Vaping Use: Never used   Substance and Sexual Activity   • Alcohol use: Never   • Drug use: Never   • Sexual activity: Not on file   Other Topics Concern   • Not on file   Social History Narrative   • Not on file     Social Determinants of Health     Financial Resource Strain: Low Risk    • Difficulty of Paying Living Expenses: Not hard at all   Food Insecurity: Not on file   Transportation Needs: No Transportation Needs   • Lack of Transportation (Medical): No   • Lack of Transportation (Non-Medical):  No   Physical Activity: Not on file   Stress: Not on file   Social Connections: Not on file   Intimate Partner Violence: Not on file   Housing Stability: Not on file     Medications have been verified  Objective   /65   Pulse 84   Temp (!) 97 3 °F (36 3 °C)   Resp 20   Ht 5' 3" (1 6 m)   Wt 79 kg (174 lb 3 2 oz)   SpO2 95%   BMI 30 86 kg/m²      Physical Exam   Physical Exam  Vitals and nursing note reviewed  Constitutional:       General: She is not in acute distress  Appearance: She is well-developed  HENT:      Head: Normocephalic and atraumatic  Right Ear: Tympanic membrane normal       Left Ear: Tympanic membrane normal       Nose: Congestion present  No mucosal edema or rhinorrhea  Right Sinus: No maxillary sinus tenderness or frontal sinus tenderness  Left Sinus: No maxillary sinus tenderness or frontal sinus tenderness  Mouth/Throat:      Pharynx: No oropharyngeal exudate or posterior oropharyngeal erythema  Eyes:      Conjunctiva/sclera: Conjunctivae normal    Cardiovascular:      Rate and Rhythm: Normal rate and regular rhythm  Heart sounds: Normal heart sounds  No murmur heard

## 2023-02-20 LAB
FLUAV RNA RESP QL NAA+PROBE: NEGATIVE
FLUBV RNA RESP QL NAA+PROBE: NEGATIVE
SARS-COV-2 RNA RESP QL NAA+PROBE: NEGATIVE

## 2023-02-21 ENCOUNTER — TELEPHONE (OUTPATIENT)
Dept: FAMILY MEDICINE CLINIC | Facility: CLINIC | Age: 73
End: 2023-02-21

## 2023-02-21 NOTE — TELEPHONE ENCOUNTER
Started with congestion and very stuffy nose feels like its blocked, tried over the counter and nothing is helping, would like an antibiotic sent in to [facility], Any questions please call patient at [phone]

## 2023-02-21 NOTE — TELEPHONE ENCOUNTER
She was just on an antibiotic 2 weeks ago  It looks like she was in urgent care and this started less than a week ago  I recommend that she continue OTC medication and follow up with an appointment if no improvement

## 2023-04-01 ENCOUNTER — OFFICE VISIT (OUTPATIENT)
Dept: URGENT CARE | Facility: CLINIC | Age: 73
End: 2023-04-01

## 2023-04-01 VITALS
DIASTOLIC BLOOD PRESSURE: 57 MMHG | TEMPERATURE: 98.3 F | BODY MASS INDEX: 30.83 KG/M2 | RESPIRATION RATE: 16 BRPM | WEIGHT: 174 LBS | HEIGHT: 63 IN | HEART RATE: 80 BPM | SYSTOLIC BLOOD PRESSURE: 112 MMHG | OXYGEN SATURATION: 98 %

## 2023-04-01 DIAGNOSIS — R35.0 URINE FREQUENCY: ICD-10-CM

## 2023-04-01 DIAGNOSIS — N39.0 ACUTE UTI: Primary | ICD-10-CM

## 2023-04-01 LAB
SL AMB  POCT GLUCOSE, UA: NEGATIVE
SL AMB LEUKOCYTE ESTERASE,UA: ABNORMAL
SL AMB POCT BILIRUBIN,UA: NEGATIVE
SL AMB POCT BLOOD,UA: ABNORMAL
SL AMB POCT CLARITY,UA: ABNORMAL
SL AMB POCT COLOR,UA: ABNORMAL
SL AMB POCT KETONES,UA: NEGATIVE
SL AMB POCT NITRITE,UA: POSITIVE
SL AMB POCT PH,UA: 6.5
SL AMB POCT SPECIFIC GRAVITY,UA: 1
SL AMB POCT URINE PROTEIN: 30
SL AMB POCT UROBILINOGEN: 0.2

## 2023-04-01 RX ORDER — NITROFURANTOIN 25; 75 MG/1; MG/1
100 CAPSULE ORAL 2 TIMES DAILY
Qty: 10 CAPSULE | Refills: 0 | Status: SHIPPED | OUTPATIENT
Start: 2023-04-01 | End: 2023-04-01

## 2023-04-01 RX ORDER — NITROFURANTOIN 25; 75 MG/1; MG/1
100 CAPSULE ORAL 2 TIMES DAILY
Qty: 10 CAPSULE | Refills: 0 | Status: SHIPPED | OUTPATIENT
Start: 2023-04-01 | End: 2023-04-06

## 2023-04-01 NOTE — PROGRESS NOTES
"  [address] [facility]        NAME: Ana Maria Knowles is a 68 y o  female  : 1950    MRN: [id number]  DATE: 2023  TIME: 5:04 PM    Assessment and Plan   Acute UTI [N39 0]  1  Acute UTI  nitrofurantoin (MACROBID) 100 mg capsule    DISCONTINUED: nitrofurantoin (MACROBID) 100 mg capsule      2  Urine frequency  POCT urine dip    Urine culture        Urine dip- mathieu and cloudy, moderate blood, positive nitrites, large leuks   urine culture pending  Patient changed her mind and did not want a paper Rx- paper rx shredded and Rx sent over to the pharmacy per patient request    Patient Instructions     Take all antibiotics as prescribed  Drink lots of clear fluids  Call us in 2 days for urine culture results  Follow-up with PCP in the next 2-3 days for reexamination and to ensure resolution of symptoms  Call info link - [phone]  Go to ER if worsening symptoms, fevers, back pain, side/flank pain, abdominal pain, nausea, vomiting or other new or concerning symptoms     Chief Complaint     Chief Complaint   Patient presents with   • Urinary Tract Infection     Pressure , abd pain when urinates started today         History of Present Illness       69 y/o female presents with \"UTI\" since this morning  States she is having burning with urination and some mild bladder pressure  States it feels like her previous UTIs  Has not tried any for it but came here for the antibiotics  States she has tolerated the Macrobid in the past without any problems  Denies any abnormal bleeding or discharge  Denies any fevers, chills, nausea, vomiting, back pain, side/flank pain, abdominal pain, chest pain, shortness of breath or other complaints  Review of Systems   Review of Systems   Constitutional: Negative for activity change, appetite change, chills, fatigue and fever  Respiratory: Negative for shortness of breath  Cardiovascular: Negative for chest pain     Gastrointestinal: Negative for abdominal " pain, diarrhea, nausea and vomiting  Genitourinary: Positive for dysuria  Negative for decreased urine volume, flank pain, frequency, genital sores, hematuria, pelvic pain, urgency, vaginal bleeding, vaginal discharge and vaginal pain  Mild bladder pressure   Musculoskeletal: Negative for back pain and myalgias  Neurological: Negative for dizziness, weakness and light-headedness  All other systems reviewed and are negative          Current Medications       Current Outpatient Medications:   •  albuterol (PROVENTIL HFA,VENTOLIN HFA) 90 mcg/act inhaler, Inhale 2 puffs every 6 (six) hours as needed for wheezing or shortness of breath, Disp: 18 g, Rfl: 0  •  aspirin (ECOTRIN LOW STRENGTH) 81 mg EC tablet, Take 1 tablet (81 mg total) by mouth daily, Disp: 90 tablet, Rfl: 0  •  latanoprost (XALATAN) 0 005 % ophthalmic solution, Apply to eye, Disp: , Rfl:   •  magnesium Oxide (MAG-OX) 400 mg TABS, Take 1 tablet (400 mg total) by mouth 2 (two) times a day, Disp: 90 tablet, Rfl: 3  •  meclizine (ANTIVERT) 25 mg tablet, Take 1 tablet (25 mg total) by mouth 3 (three) times a day as needed for dizziness, Disp: 30 tablet, Rfl: 0  •  mupirocin (BACTROBAN) 2 % ointment, Apply topically 3 (three) times a day, Disp: 22 g, Rfl: 0  •  nitrofurantoin (MACROBID) 100 mg capsule, Take 1 capsule (100 mg total) by mouth 2 (two) times a day for 5 days, Disp: 10 capsule, Rfl: 0  •  omeprazole (PriLOSEC) 20 mg delayed release capsule, Take 1 capsule (20 mg total) by mouth daily before breakfast, Disp: 90 capsule, Rfl: 3  •  Riboflavin 400 MG TABS, Take 1 tablet (400 mg total) by mouth 2 (two) times a day, Disp: 180 tablet, Rfl: 0  •  acyclovir (ZOVIRAX) 5 % ointment, Apply 1 application topically As directed (Patient not taking: Reported on 2/19/2023), Disp: , Rfl:   •  Spiriva Respimat 2 5 MCG/ACT AERS inhaler, , Disp: , Rfl:     Current Allergies     Allergies as of 04/01/2023 - Reviewed 04/01/2023   Allergen Reaction Noted   • "Ciprofloxacin  02/03/2020   • Clindamycin  02/03/2020   • Fluticasone-salmeterol Other (See Comments) 10/13/2020   • Keflex [cephalexin]  02/03/2020   • Naproxen Hives and Other (See Comments) 08/03/2017   • Nsaids  10/04/2020   • Sulfa antibiotics Hives, Itching, and Swelling 08/03/2017   • Atorvastatin Myalgia 10/26/2022            The following portions of the patient's history were reviewed and updated as appropriate: allergies, current medications, past family history, past medical history, past social history, past surgical history and problem list      Past Medical History:   Diagnosis Date   • Cancer ([facility] )     gallbladder   • COVID-19    • GERD (gastroesophageal reflux disease)    • Glaucoma    • History of gallbladder cancer     Approx  3 years ago   • Hyperlipemia    • Lightheadedness 5/3/2021   • Murmur    • Rheumatic fever    • RUQ abdominal pain 5/3/2021       Past Surgical History:   Procedure Laterality Date   • CHOLECYSTECTOMY     • TONSILLECTOMY  CHILDHOOD       Family History   Problem Relation Age of Onset   • Heart attack Mother    • Kidney disease Father    • Breast cancer Sister 77   • No Known Problems Daughter    • No Known Problems Maternal Grandmother    • No Known Problems Paternal Grandmother    • No Known Problems Sister    • No Known Problems Maternal Aunt    • No Known Problems Maternal Aunt    • No Known Problems Maternal Aunt    • No Known Problems Paternal Aunt    • No Known Problems Paternal Aunt          Medications have been verified  Objective   /57   Pulse 80   Temp 98 3 °F (36 8 °C)   Resp 16   Ht 5' 3\" (1 6 m)   Wt 78 9 kg (174 lb)   SpO2 98%   BMI 30 82 kg/m²        Physical Exam     Physical Exam  Vitals and nursing note reviewed  Constitutional:       General: She is not in acute distress  Appearance: She is well-developed  She is not ill-appearing or toxic-appearing  HENT:      Head: Normocephalic and atraumatic     Cardiovascular:      Rate and " Rhythm: Normal rate and regular rhythm  Heart sounds: Normal heart sounds  Pulmonary:      Effort: Pulmonary effort is normal       Breath sounds: Normal breath sounds  No wheezing  Abdominal:      General: Bowel sounds are normal       Palpations: Abdomen is soft  Tenderness: There is no abdominal tenderness  There is no right CVA tenderness, left CVA tenderness, guarding or rebound  Comments: No CVA tenderness   Skin:     Capillary Refill: Capillary refill takes less than 2 seconds  Neurological:      Mental Status: She is alert and oriented to person, place, and time     Psychiatric:         Behavior: Behavior normal

## 2023-04-01 NOTE — PATIENT INSTRUCTIONS
Take all antibiotics as prescribed  Drink lots of clear fluids  Call us in 2 days for urine culture results  Follow-up with PCP in the next 2-3 days for reexamination and to ensure resolution of symptoms     Call info link - [phone]  Go to ER if worsening symptoms, fevers, back pain, side/flank pain, abdominal pain, nausea, vomiting or other new or concerning symptoms

## 2023-04-02 LAB — BACTERIA UR CULT: ABNORMAL

## 2023-04-03 LAB — BACTERIA UR CULT: ABNORMAL

## 2023-04-05 ENCOUNTER — TELEPHONE (OUTPATIENT)
Dept: URGENT CARE | Facility: CLINIC | Age: 73
End: 2023-04-05

## 2023-04-05 ENCOUNTER — TELEPHONE (OUTPATIENT)
Dept: FAMILY MEDICINE CLINIC | Facility: CLINIC | Age: 73
End: 2023-04-05

## 2023-04-05 DIAGNOSIS — N39.0 URINARY TRACT INFECTION WITHOUT HEMATURIA, SITE UNSPECIFIED: Primary | ICD-10-CM

## 2023-04-05 RX ORDER — AMOXICILLIN 875 MG/1
875 TABLET, COATED ORAL 2 TIMES DAILY
Qty: 14 TABLET | Refills: 0 | Status: SHIPPED | OUTPATIENT
Start: 2023-04-05 | End: 2023-04-12

## 2023-04-05 NOTE — TELEPHONE ENCOUNTER
Please clarify allergies to Keflex and Cipro  Why symptoms did she get when taking? Was it nausea and diarrhea? Or was it worse like anaphylaxis?

## 2023-04-05 NOTE — TELEPHONE ENCOUNTER
Spoke with patient  She does not remember what type of reaction she gets from either of the medications  Patient remembers many years ago shaking from one of the medications but unsure of which medication it was from   Did state she is willing to try medications

## 2023-04-05 NOTE — TELEPHONE ENCOUNTER
Patient returned call  Discussed urine cx results at length  Patient states she is still having burning with urination, bladder pressure but is now having right sided flank/back pain  Denies fevers or chills, n/v  Multiple drug allergies  Discussed options with patient at length   Patient is agreeable to go to the ER for reevaluation since new right sided back/flank pain, further work-up and treatment as may need IV abx due to urine cx and her multiple drug allergies

## 2023-04-05 NOTE — TELEPHONE ENCOUNTER
We can try amoxicillin for her  If she does start with nausea, vomiting, fever, and chills she will need to go to the ED

## 2023-04-05 NOTE — TELEPHONE ENCOUNTER
Called and left another voicemail requesting return call hand grasp, leg strength strong and equal bilaterally

## 2023-04-05 NOTE — TELEPHONE ENCOUNTER
Pt called office following phone call with urgent care provider  Was there on Saturday for UTI  States they called her and recommended ED evaluation as antibiotic Rx'd will not work for her infection  Pt is looking for your input on this  Please advise

## 2023-05-01 DIAGNOSIS — K21.9 GASTROESOPHAGEAL REFLUX DISEASE WITHOUT ESOPHAGITIS: Primary | ICD-10-CM

## 2023-05-01 LAB — HBA1C MFR BLD HPLC: 5.9 %

## 2023-05-02 ENCOUNTER — OFFICE VISIT (OUTPATIENT)
Dept: FAMILY MEDICINE CLINIC | Facility: CLINIC | Age: 73
End: 2023-05-02

## 2023-05-02 VITALS
OXYGEN SATURATION: 98 % | BODY MASS INDEX: 31.54 KG/M2 | HEART RATE: 63 BPM | SYSTOLIC BLOOD PRESSURE: 118 MMHG | DIASTOLIC BLOOD PRESSURE: 68 MMHG | HEIGHT: 63 IN | TEMPERATURE: 98.3 F | WEIGHT: 178 LBS

## 2023-05-02 DIAGNOSIS — N39.3 STRESS INCONTINENCE: ICD-10-CM

## 2023-05-02 DIAGNOSIS — C23 PRIMARY GALL BLADDER ADENOCARCINOMA (HCC): ICD-10-CM

## 2023-05-02 DIAGNOSIS — J44.9 COPD, GROUP A, BY GOLD 2017 CLASSIFICATION (HCC): ICD-10-CM

## 2023-05-02 DIAGNOSIS — R35.0 URINARY FREQUENCY: ICD-10-CM

## 2023-05-02 DIAGNOSIS — R73.03 PREDIABETES: ICD-10-CM

## 2023-05-02 DIAGNOSIS — T78.40XA ALLERGY, INITIAL ENCOUNTER: ICD-10-CM

## 2023-05-02 DIAGNOSIS — E78.1 PURE HYPERTRIGLYCERIDEMIA: ICD-10-CM

## 2023-05-02 DIAGNOSIS — R09.82 POST-NASAL DRIP: Primary | ICD-10-CM

## 2023-05-02 LAB
SL AMB  POCT GLUCOSE, UA: NEGATIVE
SL AMB LEUKOCYTE ESTERASE,UA: NEGATIVE
SL AMB POCT BILIRUBIN,UA: NEGATIVE
SL AMB POCT BLOOD,UA: NEGATIVE
SL AMB POCT CLARITY,UA: CLEAR
SL AMB POCT COLOR,UA: YELLOW
SL AMB POCT KETONES,UA: NEGATIVE
SL AMB POCT NITRITE,UA: NEGATIVE
SL AMB POCT PH,UA: 5.5
SL AMB POCT SPECIFIC GRAVITY,UA: >=1.03
SL AMB POCT URINE PROTEIN: NEGATIVE
SL AMB POCT UROBILINOGEN: 0.2

## 2023-05-02 RX ORDER — FLUTICASONE PROPIONATE AND SALMETEROL 100; 50 UG/1; UG/1
1 POWDER RESPIRATORY (INHALATION) 2 TIMES DAILY
Qty: 60 BLISTER | Refills: 5 | Status: SHIPPED | OUTPATIENT
Start: 2023-05-02 | End: 2023-10-29

## 2023-05-02 RX ORDER — AZELASTINE 1 MG/ML
1 SPRAY, METERED NASAL 2 TIMES DAILY
Qty: 30 ML | Refills: 3 | Status: SHIPPED | OUTPATIENT
Start: 2023-05-02

## 2023-05-02 NOTE — PROGRESS NOTES
[address] [place] [address] Primary Care        NAME: Surekha Garcia is a 68 y o  female  : 1950    MRN: [id number]  DATE: May 2, 2023  TIME: 9:23 AM    Assessment and Plan   Post-nasal drip [R09 82]  1  Post-nasal drip  Ambulatory Referral to Otolaryngology    azelastine (ASTELIN) 0 1 % nasal spray      2  Allergy, initial encounter  Ambulatory Referral to Otolaryngology    azelastine (ASTELIN) 0 1 % nasal spray      3  Primary gall bladder adenocarcinoma ([facility] [address] )        4  COPD, group A, by GOLD 2017 classification ([facility] )  Fluticasone-Salmeterol (Advair) 100-50 mcg/dose inhaler      5  Pure hypertriglyceridemia  Lipid panel      6  Prediabetes  HEMOGLOBIN A1C W/ EAG ESTIMATION    Comprehensive metabolic panel      7  Urinary frequency  Ambulatory Referral to Urology    POCT urine dip      8  Stress incontinence  Ambulatory Referral to Urology    POCT urine dip        1  Post-nasal drip   stop flonase  Start astelin  Follow up with ENT if no improvement      - Ambulatory Referral to Otolaryngology; Future  - azelastine (ASTELIN) 0 1 % nasal spray; 1 spray into each nostril 2 (two) times a day Use in each nostril as directed  Dispense: 30 mL; Refill: 3    2  Allergy, initial encounter    - Ambulatory Referral to Otolaryngology; Future  - azelastine (ASTELIN) 0 1 % nasal spray; 1 spray into each nostril 2 (two) times a day Use in each nostril as directed  Dispense: 30 mL; Refill: 3    3  Primary gall bladder adenocarcinoma ([facility] [address] )      4  COPD, group A, by GOLD 2017 classification ([facility] )  - wheezing worsening  Will start advair  Listed under allergies as having leg cramps when using inhaler in the past but does not recall this  Will try the medication again    - Fluticasone-Salmeterol (Advair) 100-50 mcg/dose inhaler; Inhale 1 puff 2 (two) times a day Rinse mouth after use  Dispense: 60 blister; Refill: 5    5  Pure hypertriglyceridemia    - Lipid panel; Future    6   Prediabetes  No changes    - HEMOGLOBIN A1C W/ EAG ESTIMATION; Future  - Comprehensive metabolic panel; Future    7  Urinary frequency  Results for orders placed or performed in visit on 05/02/23   POCT urine dip   Result Value Ref Range    LEUKOCYTE ESTERASE,UA Negative     NITRITE,UA Negative     SL AMB POCT UROBILINOGEN 0 2     POCT URINE PROTEIN Negative      PH,UA 5 5     BLOOD,UA Negative     SPECIFIC GRAVITY,UA >=1 030     KETONES,UA Negative     BILIRUBIN,UA Negative     GLUCOSE, UA Negative      COLOR,UA Yellow     CLARITY,UA Clear      Normal urine  Will have her follow up with urology for worsening stress incontinence and feeling like she doesn't empty fully  - Ambulatory Referral to Urology; Future  - POCT urine dip    8  Stress incontinence    - Ambulatory Referral to Urology; Future  - POCT urine dip      Patient Instructions     There are no Patient Instructions on file for this visit  Chief Complaint     Chief Complaint   Patient presents with    Follow-up     Patient here today for follow up, labs and PreDM  History of Present Illness       Patient here for routine follow up visit for prediabetes  PreDm- HgbA1c is 5 9  COPD- feels like she is getting more SOB and wheezing with activity [place]  Using the albuterol inhaler with a little relief but not fully  Allergy to Advair listed as leg cramps, patient does not recall  Willing to try medication again to see if this help as wheezing is worsenign  urine leakage, doesn't feel like she empties fully, smaller amounts  Postnasal drip- feel stuck in throat  Using nasal spray saline and flonase, allergy pills, mucinex  Review of Systems   Review of Systems   Constitutional: Positive for fatigue  HENT: Positive for postnasal drip  Negative for congestion, sinus pressure and sneezing  Respiratory: Positive for wheezing  Negative for cough  Cardiovascular: Negative  Negative for chest pain  Gastrointestinal: Negative  Skin: Negative  Neurological: Negative  Negative for dizziness, light-headedness and headaches  Psychiatric/Behavioral: Negative  PHQ-2/9 Depression Screening    Little interest or pleasure in doing things: 0 - not at all  Feeling down, depressed, or hopeless: 0 - not at all  Trouble falling or staying asleep, or sleeping too much: 0 - not at all  Feeling tired or having little energy: 0 - not at all  Poor appetite or overeatin - not at all  Feeling bad about yourself - or that you are a failure or have let yourself or your family down: 0 - not at all  Trouble concentrating on things, such as reading the newspaper or watching television: 0 - not at all  Moving or speaking so slowly that other people could have noticed   Or the opposite - being so fidgety or restless that you have been moving around a lot more than usual: 0 - not at all  Thoughts that you would be better off dead, or of hurting yourself in some way: 0 - not at all  PHQ-9 Score: 0   PHQ-9 Interpretation: No or Minimal depression         Current Medications       Current Outpatient Medications:     albuterol (PROVENTIL HFA,VENTOLIN HFA) 90 mcg/act inhaler, Inhale 2 puffs every 6 (six) hours as needed for wheezing or shortness of breath, Disp: 18 g, Rfl: 0    aspirin (ECOTRIN LOW STRENGTH) 81 mg EC tablet, Take 1 tablet (81 mg total) by mouth daily, Disp: 90 tablet, Rfl: 0    azelastine (ASTELIN) 0 1 % nasal spray, 1 spray into each nostril 2 (two) times a day Use in each nostril as directed, Disp: 30 mL, Rfl: 3    Fluticasone-Salmeterol (Advair) 100-50 mcg/dose inhaler, Inhale 1 puff 2 (two) times a day Rinse mouth after use , Disp: 60 blister, Rfl: 5    latanoprost (XALATAN) 0 005 % ophthalmic solution, Apply to eye, Disp: , Rfl:     magnesium Oxide (MAG-OX) 400 mg TABS, take 1 tablet by mouth twice a day, Disp: 30 tablet, Rfl: 0    meclizine (ANTIVERT) 25 mg tablet, Take 1 tablet (25 mg total) by mouth 3 (three) times a day as needed for dizziness, Disp: 30 tablet, Rfl: 0    mupirocin (BACTROBAN) 2 % ointment, Apply topically 3 (three) times a day, Disp: 22 g, Rfl: 0    omeprazole (PriLOSEC) 20 mg delayed release capsule, Take 1 capsule (20 mg total) by mouth daily before breakfast, Disp: 90 capsule, Rfl: 3    Riboflavin 400 MG TABS, Take 1 tablet (400 mg total) by mouth 2 (two) times a day, Disp: 180 tablet, Rfl: 0    acyclovir (ZOVIRAX) 5 % ointment, Apply 1 application topically As directed (Patient not taking: Reported on 2/19/2023), Disp: , Rfl:     Spiriva Respimat 2 5 MCG/ACT AERS inhaler, , Disp: , Rfl:     Current Allergies     Allergies as of 05/02/2023 - Reviewed 05/02/2023   Allergen Reaction Noted    Ciprofloxacin  02/03/2020    Clindamycin  02/03/2020    Fluticasone-salmeterol Other (See Comments) 10/13/2020    Keflex [cephalexin]  02/03/2020    Naproxen Hives and Other (See Comments) 08/03/2017    Nsaids  10/04/2020    Sulfa antibiotics Hives, Itching, and Swelling 08/03/2017    Atorvastatin Myalgia 10/26/2022            The following portions of the patient's history were reviewed and updated as appropriate: allergies, current medications, past family history, past medical history, past social history, past surgical history and problem list      Past Medical History:   Diagnosis Date    Cancer ([facility] [address] )     gallbladder    COVID-19     GERD (gastroesophageal reflux disease)     Glaucoma     History of gallbladder cancer     Approx   3 years ago    Hyperlipemia     Lightheadedness 5/3/2021    Murmur     Rheumatic fever     RUQ abdominal pain 5/3/2021       Past Surgical History:   Procedure Laterality Date    CHOLECYSTECTOMY      TONSILLECTOMY  CHILDHOOD       Family History   Problem Relation Age of Onset    Heart attack Mother     Kidney disease Father     Breast cancer Sister 77    No Known Problems Daughter     No Known Problems Maternal Grandmother     No Known Problems Paternal Grandmother "   No Known Problems Sister     No Known Problems Maternal Aunt     No Known Problems Maternal Aunt     No Known Problems Maternal Aunt     No Known Problems Paternal Aunt     No Known Problems Paternal Aunt          Medications have been verified  Objective   /68   Pulse 63   Temp 98 3 °F (36 8 °C)   Ht 5' 3\" (1 6 m)   Wt 80 7 kg (178 lb)   SpO2 98%   BMI 31 53 kg/m²        Physical Exam     Physical Exam  Vitals and nursing note reviewed  Constitutional:       General: She is not in acute distress  Appearance: Normal appearance  She is well-developed  She is not ill-appearing  HENT:      Head: Normocephalic and atraumatic  Right Ear: Tympanic membrane and ear canal normal       Left Ear: Tympanic membrane and ear canal normal       Nose: Nose normal  No congestion  Mouth/Throat:      Mouth: Mucous membranes are moist       Pharynx: No posterior oropharyngeal erythema  Eyes:      General: Lids are normal       Conjunctiva/sclera: Conjunctivae normal    Cardiovascular:      Rate and Rhythm: Normal rate and regular rhythm  Heart sounds: Normal heart sounds, S1 normal and S2 normal  No murmur heard  No friction rub  No gallop  Pulmonary:      Effort: Pulmonary effort is normal  No respiratory distress  Breath sounds: Normal breath sounds  No decreased breath sounds or wheezing  Musculoskeletal:         General: No tenderness or deformity  Normal range of motion  Skin:     General: Skin is warm  Findings: No erythema or rash  Neurological:      Mental Status: She is alert and oriented to person, place, and time  Psychiatric:         Behavior: Behavior normal  Behavior is cooperative  Thought Content:  Thought content normal              "

## 2023-06-09 DIAGNOSIS — K21.9 GASTROESOPHAGEAL REFLUX DISEASE WITHOUT ESOPHAGITIS: ICD-10-CM

## 2023-06-09 RX ORDER — OMEPRAZOLE 20 MG/1
20 CAPSULE, DELAYED RELEASE ORAL
Qty: 90 CAPSULE | Refills: 3 | Status: SHIPPED | OUTPATIENT
Start: 2023-06-09

## 2023-06-09 NOTE — TELEPHONE ENCOUNTER
Patient requesting refill(s) of: Omeprazole 20mg    Last filled: 12/19/21   Last appt: 5/2/23  Next appt: 9/7/23  Pharmacy: [facility]

## 2023-06-19 ENCOUNTER — TELEPHONE (OUTPATIENT)
Dept: FAMILY MEDICINE CLINIC | Facility: CLINIC | Age: 73
End: 2023-06-19

## 2023-06-19 DIAGNOSIS — N89.8 VAGINAL IRRITATION: Primary | ICD-10-CM

## 2023-06-19 NOTE — TELEPHONE ENCOUNTER
Test ordered, she can come pick this up  Does she want an appointment? We can swab her for yeast infection and BV if she would like but would need to come in for an appointment

## 2023-06-19 NOTE — TELEPHONE ENCOUNTER
Patient called the office today  She is getting a lot of irritation in her private area  She would like to be tested for herpes and would like to get this test done at [facility]  Please advise

## 2023-06-20 ENCOUNTER — APPOINTMENT (OUTPATIENT)
Dept: LAB | Facility: CLINIC | Age: 73
End: 2023-06-20
Payer: COMMERCIAL

## 2023-06-20 DIAGNOSIS — R73.03 PREDIABETES: ICD-10-CM

## 2023-06-20 DIAGNOSIS — E78.1 PURE HYPERTRIGLYCERIDEMIA: ICD-10-CM

## 2023-06-20 DIAGNOSIS — N89.8 VAGINAL IRRITATION: ICD-10-CM

## 2023-06-20 LAB
ALBUMIN SERPL BCP-MCNC: 3.8 G/DL (ref 3.5–5)
ALP SERPL-CCNC: 88 U/L (ref 46–116)
ALT SERPL W P-5'-P-CCNC: 23 U/L (ref 12–78)
ANION GAP SERPL CALCULATED.3IONS-SCNC: 1 MMOL/L
AST SERPL W P-5'-P-CCNC: 21 U/L (ref 5–45)
BASOPHILS # BLD AUTO: 0.06 THOUSANDS/ÂΜL (ref 0–0.1)
BASOPHILS NFR BLD AUTO: 1 % (ref 0–1)
BILIRUB SERPL-MCNC: 0.76 MG/DL (ref 0.2–1)
BUN SERPL-MCNC: 19 MG/DL (ref 5–25)
CALCIUM SERPL-MCNC: 9.2 MG/DL (ref 8.3–10.1)
CHLORIDE SERPL-SCNC: 110 MMOL/L (ref 96–108)
CHOLEST SERPL-MCNC: 186 MG/DL
CO2 SERPL-SCNC: 28 MMOL/L (ref 21–32)
CREAT SERPL-MCNC: 0.83 MG/DL (ref 0.6–1.3)
EOSINOPHIL # BLD AUTO: 0.36 THOUSAND/ÂΜL (ref 0–0.61)
EOSINOPHIL NFR BLD AUTO: 6 % (ref 0–6)
ERYTHROCYTE [DISTWIDTH] IN BLOOD BY AUTOMATED COUNT: 13 % (ref 11.6–15.1)
GFR SERPL CREATININE-BSD FRML MDRD: 70 ML/MIN/1.73SQ M
GLUCOSE P FAST SERPL-MCNC: 105 MG/DL (ref 65–99)
HCT VFR BLD AUTO: 42.2 % (ref 34.8–46.1)
HDLC SERPL-MCNC: 48 MG/DL
HGB BLD-MCNC: 13.7 G/DL (ref 11.5–15.4)
IMM GRANULOCYTES # BLD AUTO: 0.02 THOUSAND/UL (ref 0–0.2)
IMM GRANULOCYTES NFR BLD AUTO: 0 % (ref 0–2)
LDLC SERPL CALC-MCNC: 124 MG/DL (ref 0–100)
LYMPHOCYTES # BLD AUTO: 2.21 THOUSANDS/ÂΜL (ref 0.6–4.47)
LYMPHOCYTES NFR BLD AUTO: 34 % (ref 14–44)
MCH RBC QN AUTO: 31.4 PG (ref 26.8–34.3)
MCHC RBC AUTO-ENTMCNC: 32.5 G/DL (ref 31.4–37.4)
MCV RBC AUTO: 97 FL (ref 82–98)
MONOCYTES # BLD AUTO: 0.6 THOUSAND/ÂΜL (ref 0.17–1.22)
MONOCYTES NFR BLD AUTO: 9 % (ref 4–12)
NEUTROPHILS # BLD AUTO: 3.28 THOUSANDS/ÂΜL (ref 1.85–7.62)
NEUTS SEG NFR BLD AUTO: 50 % (ref 43–75)
NONHDLC SERPL-MCNC: 138 MG/DL
NRBC BLD AUTO-RTO: 0 /100 WBCS
PLATELET # BLD AUTO: 252 THOUSANDS/UL (ref 149–390)
PMV BLD AUTO: 10 FL (ref 8.9–12.7)
POTASSIUM SERPL-SCNC: 4.2 MMOL/L (ref 3.5–5.3)
PROT SERPL-MCNC: 7.8 G/DL (ref 6.4–8.4)
RBC # BLD AUTO: 4.37 MILLION/UL (ref 3.81–5.12)
SODIUM SERPL-SCNC: 139 MMOL/L (ref 135–147)
TRIGL SERPL-MCNC: 68 MG/DL
WBC # BLD AUTO: 6.53 THOUSAND/UL (ref 4.31–10.16)

## 2023-06-20 PROCEDURE — 86695 HERPES SIMPLEX TYPE 1 TEST: CPT

## 2023-06-20 PROCEDURE — 86696 HERPES SIMPLEX TYPE 2 TEST: CPT

## 2023-06-20 NOTE — TELEPHONE ENCOUNTER
Patient notified  Will  paper scripts to take to [facility]  States she just wants to do labs and then go from there

## 2023-06-21 LAB
HSV1 IGG SER IA-ACNC: 13.4 INDEX (ref 0–0.9)
HSV2 IGG SER IA-ACNC: <0.91 INDEX (ref 0–0.9)

## 2023-06-23 ENCOUNTER — TELEPHONE (OUTPATIENT)
Dept: FAMILY MEDICINE CLINIC | Facility: CLINIC | Age: 73
End: 2023-06-23

## 2023-06-23 NOTE — TELEPHONE ENCOUNTER
Patient called looking for lab results  Aware that provider is no longer in office and will be addressed upon her return  Please advise

## 2023-07-05 ENCOUNTER — CLINICAL SUPPORT (OUTPATIENT)
Dept: FAMILY MEDICINE CLINIC | Facility: CLINIC | Age: 73
End: 2023-07-05
Payer: COMMERCIAL

## 2023-07-05 ENCOUNTER — TELEPHONE (OUTPATIENT)
Dept: FAMILY MEDICINE CLINIC | Facility: CLINIC | Age: 73
End: 2023-07-05

## 2023-07-05 DIAGNOSIS — R30.0 BURNING WITH URINATION: Primary | ICD-10-CM

## 2023-07-05 DIAGNOSIS — R39.89 SENSATION OF PRESSURE IN BLADDER AREA: ICD-10-CM

## 2023-07-05 DIAGNOSIS — R39.9 UTI SYMPTOMS: Primary | ICD-10-CM

## 2023-07-05 LAB
SL AMB  POCT GLUCOSE, UA: NEGATIVE
SL AMB LEUKOCYTE ESTERASE,UA: ABNORMAL
SL AMB POCT BILIRUBIN,UA: NEGATIVE
SL AMB POCT BLOOD,UA: ABNORMAL
SL AMB POCT CLARITY,UA: CLEAR
SL AMB POCT COLOR,UA: YELLOW
SL AMB POCT KETONES,UA: NEGATIVE
SL AMB POCT NITRITE,UA: NEGATIVE
SL AMB POCT PH,UA: 5.5
SL AMB POCT SPECIFIC GRAVITY,UA: <=1.005
SL AMB POCT URINE PROTEIN: NEGATIVE
SL AMB POCT UROBILINOGEN: ABNORMAL

## 2023-07-05 PROCEDURE — 99211 OFF/OP EST MAY X REQ PHY/QHP: CPT

## 2023-07-05 PROCEDURE — 81002 URINALYSIS NONAUTO W/O SCOPE: CPT

## 2023-07-05 PROCEDURE — 87077 CULTURE AEROBIC IDENTIFY: CPT | Performed by: NURSE PRACTITIONER

## 2023-07-05 PROCEDURE — 87086 URINE CULTURE/COLONY COUNT: CPT | Performed by: NURSE PRACTITIONER

## 2023-07-05 PROCEDURE — 87186 SC STD MICRODIL/AGAR DIL: CPT | Performed by: NURSE PRACTITIONER

## 2023-07-05 RX ORDER — NITROFURANTOIN 25; 75 MG/1; MG/1
100 CAPSULE ORAL 2 TIMES DAILY
Qty: 10 CAPSULE | Refills: 0 | Status: SHIPPED | OUTPATIENT
Start: 2023-07-05 | End: 2023-07-10

## 2023-07-05 NOTE — TELEPHONE ENCOUNTER
Pt stopped in for NV urine dip for possible UTI. She is c/o burning with urination, and bladder pressure. Urine dip entered in system. Showing blood and leukocytes. Pt has NKDA and uses [employer].

## 2023-07-07 LAB
BACTERIA UR CULT: ABNORMAL
BACTERIA UR CULT: ABNORMAL

## 2023-08-12 ENCOUNTER — OFFICE VISIT (OUTPATIENT)
Dept: URGENT CARE | Facility: CLINIC | Age: 73
End: 2023-08-12
Payer: COMMERCIAL

## 2023-08-12 VITALS
WEIGHT: 176 LBS | OXYGEN SATURATION: 97 % | BODY MASS INDEX: 31.18 KG/M2 | TEMPERATURE: 98.1 F | SYSTOLIC BLOOD PRESSURE: 124 MMHG | DIASTOLIC BLOOD PRESSURE: 58 MMHG | HEART RATE: 71 BPM | RESPIRATION RATE: 18 BRPM | HEIGHT: 63 IN

## 2023-08-12 DIAGNOSIS — R30.0 DYSURIA: ICD-10-CM

## 2023-08-12 DIAGNOSIS — N30.00 ACUTE CYSTITIS WITHOUT HEMATURIA: Primary | ICD-10-CM

## 2023-08-12 LAB
SL AMB  POCT GLUCOSE, UA: NEGATIVE
SL AMB LEUKOCYTE ESTERASE,UA: ABNORMAL
SL AMB POCT BILIRUBIN,UA: NEGATIVE
SL AMB POCT BLOOD,UA: ABNORMAL
SL AMB POCT CLARITY,UA: ABNORMAL
SL AMB POCT COLOR,UA: ABNORMAL
SL AMB POCT KETONES,UA: NEGATIVE
SL AMB POCT NITRITE,UA: NEGATIVE
SL AMB POCT PH,UA: 6.5
SL AMB POCT SPECIFIC GRAVITY,UA: 1.02
SL AMB POCT URINE PROTEIN: 30
SL AMB POCT UROBILINOGEN: 0.2

## 2023-08-12 PROCEDURE — 99214 OFFICE O/P EST MOD 30 MIN: CPT | Performed by: NURSE PRACTITIONER

## 2023-08-12 PROCEDURE — 87186 SC STD MICRODIL/AGAR DIL: CPT | Performed by: NURSE PRACTITIONER

## 2023-08-12 PROCEDURE — 81002 URINALYSIS NONAUTO W/O SCOPE: CPT | Performed by: NURSE PRACTITIONER

## 2023-08-12 PROCEDURE — 87077 CULTURE AEROBIC IDENTIFY: CPT | Performed by: NURSE PRACTITIONER

## 2023-08-12 PROCEDURE — S9088 SERVICES PROVIDED IN URGENT: HCPCS | Performed by: NURSE PRACTITIONER

## 2023-08-12 PROCEDURE — 87086 URINE CULTURE/COLONY COUNT: CPT | Performed by: NURSE PRACTITIONER

## 2023-08-12 RX ORDER — NITROFURANTOIN 25; 75 MG/1; MG/1
100 CAPSULE ORAL 2 TIMES DAILY
Qty: 10 CAPSULE | Refills: 0 | Status: SHIPPED | OUTPATIENT
Start: 2023-08-12 | End: 2023-08-17

## 2023-08-12 NOTE — PROGRESS NOTES
[facility]        NAME: Hao Perrin is a 68 y.o. female  : 1950    MRN: [id number]  DATE: 2023  TIME: 2:18 PM    Assessment and Plan   Acute cystitis without hematuria [N30.00]  1. Acute cystitis without hematuria  nitrofurantoin (MACROBID) 100 mg capsule      2. Dysuria  POCT urine dip    Urine culture    nitrofurantoin (MACROBID) 100 mg capsule            Patient Instructions       Follow up with PCP in 3-5 days. Proceed to  ER if symptoms worsen. Your urine shows bacteria. You have been prescribed an antibiotic. You are to take all medication as prescribed. You are to avoid alcohol and caffeine - they are bladder irritants. Drink water. Take tylenol or motrin for pain or fever. You are to download [employer] for the results in 3-5 days. You will be notified if the antibiotic needs changed. Follow up with your PCP in 2-3 days. Go to the ED if symptoms worsen. You have been prescribed nitrofurantoin for UTI symptoms and most likely UTI. You need to discuss with your PCP other options for antibiotic coverage for UTI's as you were just on nitrofurantoin 3 weeks ago for UTI. Are you becoming resistant? Should you be referred to urology, urogyn? Chief Complaint     Chief Complaint   Patient presents with   • Possible UTI     Patient c/o dysuria and flank pain that started yesterday. History of Present Illness       This is a 68year old female who states started with dysuria and lower right back pain and bloating yesterday. She states that she has not had hematuria, fevers, chills, n/v/but states stools are soft. She states that she had a UTI  and was on macrobid at that time as well. She states she is well on the abx then when stops the symptoms return. She denies seeing urology or urogyn.     Pt admits to having many medication allergies (abx) and states she had a stroke and is unable to recall what the allergies are - she isn't sure if they are allergies or side effects. Review of Systems   Review of Systems   Constitutional: Negative. HENT: Negative. Eyes: Negative. Respiratory: Negative. Gastrointestinal: Positive for abdominal distention. Endocrine: Negative. Genitourinary: Positive for dysuria. Musculoskeletal: Positive for back pain. Skin: Negative. Allergic/Immunologic: Negative. Neurological: Negative. Hematological: Negative. Psychiatric/Behavioral: Negative.           Current Medications       Current Outpatient Medications:   •  aspirin (ECOTRIN LOW STRENGTH) 81 mg EC tablet, Take 1 tablet (81 mg total) by mouth daily, Disp: 90 tablet, Rfl: 0  •  azelastine (ASTELIN) 0.1 % nasal spray, 1 spray into each nostril 2 (two) times a day Use in each nostril as directed, Disp: 30 mL, Rfl: 3  •  Fluticasone-Salmeterol (Advair) 100-50 mcg/dose inhaler, Inhale 1 puff 2 (two) times a day Rinse mouth after use., Disp: 60 blister, Rfl: 5  •  ipratropium (ATROVENT) 0.03 % nasal spray, 2 sprays into each nostril 3 (three) times a day as needed for rhinitis, Disp: 30 mL, Rfl: 3  •  latanoprost (XALATAN) 0.005 % ophthalmic solution, Apply to eye, Disp: , Rfl:   •  levocetirizine (XYZAL) 5 MG tablet, Take 1 tablet (5 mg total) by mouth every evening, Disp: 30 tablet, Rfl: 11  •  magnesium Oxide (MAG-OX) 400 mg TABS, take 1 tablet by mouth twice a day, Disp: 30 tablet, Rfl: 3  •  nitrofurantoin (MACROBID) 100 mg capsule, Take 1 capsule (100 mg total) by mouth 2 (two) times a day for 5 days, Disp: 10 capsule, Rfl: 0  •  omeprazole (PriLOSEC) 20 mg delayed release capsule, Take 1 capsule (20 mg total) by mouth daily before breakfast, Disp: 90 capsule, Rfl: 3  •  Riboflavin 400 MG TABS, Take 1 tablet (400 mg total) by mouth 2 (two) times a day, Disp: 180 tablet, Rfl: 0  •  acyclovir (ZOVIRAX) 5 % ointment, Apply 1 application topically As directed (Patient not taking: Reported on 2/19/2023), Disp: , Rfl:   • albuterol (PROVENTIL HFA,VENTOLIN HFA) 90 mcg/act inhaler, Inhale 2 puffs every 6 (six) hours as needed for wheezing or shortness of breath (Patient not taking: Reported on 8/12/2023), Disp: 18 g, Rfl: 0  •  meclizine (ANTIVERT) 25 mg tablet, Take 1 tablet (25 mg total) by mouth 3 (three) times a day as needed for dizziness (Patient not taking: Reported on 8/12/2023), Disp: 30 tablet, Rfl: 0  •  mupirocin (BACTROBAN) 2 % ointment, Apply topically 3 (three) times a day (Patient not taking: Reported on 8/12/2023), Disp: 22 g, Rfl: 0  •  Spiriva Respimat 2.5 MCG/ACT AERS inhaler, , Disp: , Rfl:     Current Allergies     Allergies as of 08/12/2023 - Reviewed 08/12/2023   Allergen Reaction Noted   • Ciprofloxacin  02/03/2020   • Clindamycin  02/03/2020   • Fluticasone-salmeterol Other (See Comments) 10/13/2020   • Keflex [cephalexin]  02/03/2020   • Naproxen Hives and Other (See Comments) 08/03/2017   • Nsaids  10/04/2020   • Sulfa antibiotics Hives, Itching, and Swelling 08/03/2017   • Atorvastatin Myalgia 10/26/2022            The following portions of the patient's history were reviewed and updated as appropriate: allergies, current medications, past family history, past medical history, past social history, past surgical history and problem list.     Past Medical History:   Diagnosis Date   • Cancer ([address])     gallbladder   • COVID-19    • GERD (gastroesophageal reflux disease)    • Glaucoma    • History of gallbladder cancer     Approx.  3 years ago   • Hyperlipemia    • Lightheadedness 5/3/2021   • Murmur    • Rheumatic fever    • RUQ abdominal pain 5/3/2021       Past Surgical History:   Procedure Laterality Date   • CHOLECYSTECTOMY     • TONSILLECTOMY  CHILDHOOD       Family History   Problem Relation Age of Onset   • Heart attack Mother    • Kidney disease Father    • Breast cancer Sister 77   • No Known Problems Daughter    • No Known Problems Maternal Grandmother    • No Known Problems Paternal Grandmother    • No Known Problems Sister    • No Known Problems Maternal Aunt    • No Known Problems Maternal Aunt    • No Known Problems Maternal Aunt    • No Known Problems Paternal Aunt    • No Known Problems Paternal Aunt          Medications have been verified. Objective   /58   Pulse 71   Temp 98.1 °F (36.7 °C) (Temporal)   Resp 18   Ht 5' 3" (1.6 m)   Wt 79.8 kg (176 lb)   SpO2 97%   BMI 31.18 kg/m²   No LMP recorded. Patient is postmenopausal.       Physical Exam     Physical Exam  Vitals and nursing note reviewed. Constitutional:       General: She is not in acute distress. Appearance: Normal appearance. She is obese. She is not ill-appearing, toxic-appearing or diaphoretic. HENT:      Head: Normocephalic and atraumatic. Nose: Nose normal.      Mouth/Throat:      Mouth: Mucous membranes are moist.   Eyes:      Extraocular Movements: Extraocular movements intact. Cardiovascular:      Rate and Rhythm: Normal rate and regular rhythm. Pulses: Normal pulses. Heart sounds: Normal heart sounds. Pulmonary:      Effort: Pulmonary effort is normal.      Breath sounds: Normal breath sounds. Abdominal:      General: There is distension. Tenderness: There is no abdominal tenderness. There is no right CVA tenderness or left CVA tenderness. Musculoskeletal:         General: Normal range of motion. Cervical back: Normal range of motion and neck supple. Skin:     General: Skin is warm and dry. Capillary Refill: Capillary refill takes less than 2 seconds. Neurological:      General: No focal deficit present. Mental Status: She is alert and oriented to person, place, and time. Psychiatric:         Mood and Affect: Mood normal.         Behavior: Behavior normal.         Thought Content: Thought content normal.         Judgment: Judgment normal.             poct ua  Large blood, large leuks  Nitrates negative.   Protein 30  1.020 Sg

## 2023-08-12 NOTE — PATIENT INSTRUCTIONS
Your urine shows bacteria. You have been prescribed an antibiotic. You are to take all medication as prescribed. You are to avoid alcohol and caffeine - they are bladder irritants. Drink water. Take tylenol or motrin for pain or fever. You are to download [employer] for the results in 3-5 days. You will be notified if the antibiotic needs changed. Follow up with your PCP in 2-3 days. Go to the ED if symptoms worsen. You have been prescribed nitrofurantoin for UTI symptoms and most likely UTI. You need to discuss with your PCP other options for antibiotic coverage for UTI's as you were just on nitrofurantoin 3 weeks ago for UTI. Are you becoming resistant? Should you be referred to urology, urogyn?

## 2023-08-14 LAB — BACTERIA UR CULT: ABNORMAL

## 2023-08-14 NOTE — RESULT ENCOUNTER NOTE
>100,000 cfu/ml Gram Negative Jerson Enteric Like Abnormal    Waiting on sensitivities.   Pt prescribed macrobid

## 2023-08-15 ENCOUNTER — TELEPHONE (OUTPATIENT)
Dept: URGENT CARE | Facility: CLINIC | Age: 73
End: 2023-08-15

## 2023-08-15 DIAGNOSIS — N30.90 CYSTITIS: Primary | ICD-10-CM

## 2023-08-15 LAB — BACTERIA UR CULT: ABNORMAL

## 2023-08-15 NOTE — TELEPHONE ENCOUNTER
We can try to call urology for her. Urine culture is sensitive to Augmentin, which it looks like she recently took in January for sinusitis. Did she tolerate that okay?

## 2023-08-15 NOTE — TELEPHONE ENCOUNTER
Discussed ua cx results with pt. Informed her of only intermediate coverage with macrobid for the bacteria. Pt states she continues to have bladder pressure and doesn't feel that it is resolving. Informed pt that she is to call PCP now for follow up. Pt had a stroke and is unable to remember allergies or SE to abx and has multiple drugs listed as allergies. I told pt that her PCP knows her and will be more knowledgeable in what she has had in the past.  Pt verbalizes understanding.

## 2023-08-15 NOTE — RESULT ENCOUNTER NOTE
>100,000 cfu/ml Citrobacter koseri Abnormal    pt was prescribed macrobid due to multiple abx allergies/SE. Macrobid is intermediate coverage. LM on pt  VM to discuss coverage and check on status.     Please have your office call patient for follow up

## 2023-08-15 NOTE — TELEPHONE ENCOUNTER
Patient called asking about another medication as the Isa Hummel is not helping her, she can not recall any allergies that she has had in the past, if a medication is sent out she would like to use the [facility] in [place],     She was asking if there was another urologist that she could go to as the apt that was given to her is in October or if provider was able to get her a sooner apt with them,

## 2023-08-16 RX ORDER — AMOXICILLIN AND CLAVULANATE POTASSIUM 875; 125 MG/1; MG/1
1 TABLET, FILM COATED ORAL EVERY 12 HOURS SCHEDULED
Qty: 14 TABLET | Refills: 0 | Status: SHIPPED | OUTPATIENT
Start: 2023-08-16 | End: 2023-08-23

## 2023-08-16 NOTE — TELEPHONE ENCOUNTER
Pt reports she tolerated Augmentin well. Agreeable to have medication sent to [facility].

## 2023-08-29 ENCOUNTER — RA CDI HCC (OUTPATIENT)
Dept: OTHER | Facility: HOSPITAL | Age: 73
End: 2023-08-29

## 2023-08-29 NOTE — PROGRESS NOTES
[address] coding opportunities       Chart reviewed, no opportunity found: [address] Review     Patients Insurance     Medicare Insurance: [employer]

## 2023-09-07 ENCOUNTER — OFFICE VISIT (OUTPATIENT)
Dept: FAMILY MEDICINE CLINIC | Facility: CLINIC | Age: 73
End: 2023-09-07
Payer: COMMERCIAL

## 2023-09-07 ENCOUNTER — RA CDI HCC (OUTPATIENT)
Dept: OTHER | Facility: HOSPITAL | Age: 73
End: 2023-09-07

## 2023-09-07 VITALS
DIASTOLIC BLOOD PRESSURE: 62 MMHG | SYSTOLIC BLOOD PRESSURE: 98 MMHG | HEART RATE: 59 BPM | BODY MASS INDEX: 31.54 KG/M2 | OXYGEN SATURATION: 98 % | HEIGHT: 63 IN | TEMPERATURE: 98.4 F | WEIGHT: 178 LBS

## 2023-09-07 DIAGNOSIS — K21.9 GASTROESOPHAGEAL REFLUX DISEASE WITHOUT ESOPHAGITIS: Primary | ICD-10-CM

## 2023-09-07 DIAGNOSIS — R73.03 PREDIABETES: ICD-10-CM

## 2023-09-07 DIAGNOSIS — E78.1 PURE HYPERTRIGLYCERIDEMIA: ICD-10-CM

## 2023-09-07 DIAGNOSIS — Z87.891 HISTORY OF TOBACCO ABUSE: ICD-10-CM

## 2023-09-07 PROCEDURE — 99214 OFFICE O/P EST MOD 30 MIN: CPT | Performed by: NURSE PRACTITIONER

## 2023-09-07 NOTE — PROGRESS NOTES
[address] [facility] coding opportunities       Chart reviewed, no opportunity found: CHART REVIEWED, [address]     Patients Insurance     Medicare Insurance: [employer]

## 2023-09-07 NOTE — PROGRESS NOTES
1125  [facility]        NAME: Melissa Coppola is a 68 y.o. female  : 1950    MRN: [id number]  DATE: 2023  TIME: 9:27 AM    Assessment and Plan   Gastroesophageal reflux disease without esophagitis [K21.9]  1. Gastroesophageal reflux disease without esophagitis        2. Pure hypertriglyceridemia  Lipid panel      3. Prediabetes  Comprehensive metabolic panel    HEMOGLOBIN A1C W/ EAG ESTIMATION      4. History of tobacco abuse          1. Gastroesophageal reflux disease without esophagitis   well controlled. 2. Pure hypertriglyceridemia  Stable. - Lipid panel; Future    3. Prediabetes  Watch diet. No medication.   - Comprehensive metabolic panel; Future  - HEMOGLOBIN A1C W/ EAG ESTIMATION; Future    4. History of tobacco abuse        Patient Instructions     Patient Instructions   Schedule appointment with [facility] hematology for 2023 with his labs to be done before viist.   Complete labs for me at this time. Continue to watch diet. Chief Complaint     Chief Complaint   Patient presents with   • Follow-up     F/u DM and labs          History of Present Illness       Patient here for routine follow up visit. Feeling well. 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 Reports she has fatigue but she is very physically active everyday. The last 2 days in the heat she has not been doing much. Review of Systems   Review of Systems   Constitutional: Negative. Negative for fatigue and fever. Respiratory: Negative. Negative for shortness of breath and wheezing. Cardiovascular: Negative. Negative for chest pain and palpitations. Skin: Negative. Negative for rash. Neurological: Negative. Negative for dizziness, light-headedness and headaches. Psychiatric/Behavioral: Negative.         PHQ-2/9 Depression Screening    Little interest or pleasure in doing things: 0 - not at all  Feeling down, depressed, or hopeless: 0 - not at all  Trouble falling or staying asleep, or sleeping too much: 0 - not at all  Feeling tired or having little energy: 0 - not at all  Poor appetite or overeatin - not at all  Feeling bad about yourself - or that you are a failure or have let yourself or your family down: 0 - not at all  Trouble concentrating on things, such as reading the newspaper or watching television: 0 - not at all  Moving or speaking so slowly that other people could have noticed. Or the opposite - being so fidgety or restless that you have been moving around a lot more than usual: 0 - not at all  Thoughts that you would be better off dead, or of hurting yourself in some way: 0 - not at all  PHQ-9 Score: 0   PHQ-9 Interpretation: No or Minimal depression         Current Medications       Current Outpatient Medications:   •  acyclovir (ZOVIRAX) 5 % ointment, Apply 1 application.  topically daily as needed As directed, Disp: , Rfl:   •  albuterol (PROVENTIL HFA,VENTOLIN HFA) 90 mcg/act inhaler, Inhale 2 puffs every 6 (six) hours as needed for wheezing or shortness of breath, Disp: 18 g, Rfl: 0  •  aspirin (ECOTRIN LOW STRENGTH) 81 mg EC tablet, Take 1 tablet (81 mg total) by mouth daily, Disp: 90 tablet, Rfl: 0  •  azelastine (ASTELIN) 0.1 % nasal spray, 1 spray into each nostril 2 (two) times a day Use in each nostril as directed, Disp: 30 mL, Rfl: 3  •  Fluticasone-Salmeterol (Advair) 100-50 mcg/dose inhaler, Inhale 1 puff 2 (two) times a day Rinse mouth after use., Disp: 60 blister, Rfl: 5  •  ipratropium (ATROVENT) 0.03 % nasal spray, 2 sprays into each nostril 3 (three) times a day as needed for rhinitis, Disp: 30 mL, Rfl: 3  •  latanoprost (XALATAN) 0.005 % ophthalmic solution, Apply to eye, Disp: , Rfl:   •  magnesium Oxide (MAG-OX) 400 mg TABS, take 1 tablet by mouth twice a day, Disp: 30 tablet, Rfl: 3  •  meclizine (ANTIVERT) 25 mg tablet, Take 1 tablet (25 mg total) by mouth 3 (three) times a day as needed for dizziness, Disp: 30 tablet, Rfl: 0  •  omeprazole (PriLOSEC) 20 mg delayed release capsule, Take 1 capsule (20 mg total) by mouth daily before breakfast, Disp: 90 capsule, Rfl: 3  •  Riboflavin 400 MG TABS, Take 1 tablet (400 mg total) by mouth 2 (two) times a day, Disp: 180 tablet, Rfl: 0    Current Allergies     Allergies as of 09/07/2023 - Reviewed 09/07/2023   Allergen Reaction Noted   • Ciprofloxacin  02/03/2020   • Clindamycin  02/03/2020   • Fluticasone-salmeterol Other (See Comments) 10/13/2020   • Keflex [cephalexin]  02/03/2020   • Naproxen Hives and Other (See Comments) 08/03/2017   • Nsaids  10/04/2020   • Sulfa antibiotics Hives, Itching, and Swelling 08/03/2017   • Atorvastatin Myalgia 10/26/2022            The following portions of the patient's history were reviewed and updated as appropriate: allergies, current medications, past family history, past medical history, past social history, past surgical history and problem list.     Past Medical History:   Diagnosis Date   • Cancer ([address])     gallbladder   • COVID-19    • GERD (gastroesophageal reflux disease)    • Glaucoma    • History of gallbladder cancer     Approx. 3 years ago   • Hyperlipemia    • Lightheadedness 5/3/2021   • Murmur    • Rheumatic fever    • RUQ abdominal pain 5/3/2021       Past Surgical History:   Procedure Laterality Date   • CHOLECYSTECTOMY     • TONSILLECTOMY  CHILDHOOD       Family History   Problem Relation Age of Onset   • Heart attack Mother    • Kidney disease Father    • Breast cancer Sister 77   • No Known Problems Daughter    • No Known Problems Maternal Grandmother    • No Known Problems Paternal Grandmother    • No Known Problems Sister    • No Known Problems Maternal Aunt    • No Known Problems Maternal Aunt    • No Known Problems Maternal Aunt    • No Known Problems Paternal Aunt    • No Known Problems Paternal Aunt          Medications have been verified.         Objective   BP 98/62   Pulse 59   Temp 98.4 °F (36.9 °C) Ht 5' 3" (1.6 m)   Wt 80.7 kg (178 lb)   SpO2 98%   BMI 31.53 kg/m²        Physical Exam     Physical Exam  Vitals and nursing note reviewed. Constitutional:       General: She is not in acute distress. Appearance: Normal appearance. She is well-developed. She is not ill-appearing. Eyes:      General: Lids are normal.   Cardiovascular:      Rate and Rhythm: Normal rate and regular rhythm. Heart sounds: Normal heart sounds, S1 normal and S2 normal. No murmur heard. Pulmonary:      Effort: Pulmonary effort is normal. No respiratory distress. Breath sounds: Normal breath sounds. No decreased breath sounds or wheezing. Musculoskeletal:         General: No tenderness or deformity. Normal range of motion. Skin:     General: Skin is warm. Findings: No erythema or rash. Neurological:      Mental Status: She is alert and oriented to person, place, and time. Psychiatric:         Behavior: Behavior normal. Behavior is cooperative. Thought Content:  Thought content normal.

## 2023-09-07 NOTE — PATIENT INSTRUCTIONS
Schedule appointment with [facility] hematology for December 2023 with his labs to be done before viist.   Complete labs for me at this time. Continue to watch diet.

## 2023-10-02 ENCOUNTER — CONSULT (OUTPATIENT)
Dept: UROLOGY | Facility: CLINIC | Age: 73
End: 2023-10-02
Payer: COMMERCIAL

## 2023-10-02 VITALS
BODY MASS INDEX: 31.57 KG/M2 | HEART RATE: 65 BPM | WEIGHT: 178.2 LBS | DIASTOLIC BLOOD PRESSURE: 60 MMHG | OXYGEN SATURATION: 96 % | SYSTOLIC BLOOD PRESSURE: 122 MMHG | HEIGHT: 63 IN | TEMPERATURE: 97.6 F | RESPIRATION RATE: 16 BRPM

## 2023-10-02 DIAGNOSIS — R31.0 GROSS HEMATURIA: Primary | ICD-10-CM

## 2023-10-02 DIAGNOSIS — N39.0 RECURRENT UTI: ICD-10-CM

## 2023-10-02 DIAGNOSIS — R35.0 URINARY FREQUENCY: ICD-10-CM

## 2023-10-02 DIAGNOSIS — N39.3 STRESS INCONTINENCE: ICD-10-CM

## 2023-10-02 LAB
SL AMB  POCT GLUCOSE, UA: NORMAL
SL AMB LEUKOCYTE ESTERASE,UA: NORMAL
SL AMB POCT BILIRUBIN,UA: NORMAL
SL AMB POCT BLOOD,UA: NORMAL
SL AMB POCT CLARITY,UA: CLEAR
SL AMB POCT COLOR,UA: YELLOW
SL AMB POCT KETONES,UA: NORMAL
SL AMB POCT NITRITE,UA: NORMAL
SL AMB POCT PH,UA: 0
SL AMB POCT SPECIFIC GRAVITY,UA: 1.01
SL AMB POCT URINE PROTEIN: NORMAL
SL AMB POCT UROBILINOGEN: 0.2

## 2023-10-02 PROCEDURE — 99204 OFFICE O/P NEW MOD 45 MIN: CPT | Performed by: UROLOGY

## 2023-10-02 PROCEDURE — 81002 URINALYSIS NONAUTO W/O SCOPE: CPT | Performed by: UROLOGY

## 2023-10-02 NOTE — PROGRESS NOTES
[address] [facility]  [facility]  [address] [place] [address]  [phone]  [email or web address]      NAME: Radha Herring  AGE: 68 y.o. SEX: female  : 1950   MRN: [id number]    DATE: 10/2/2023  TIME: 8:48 AM    Assessment and Plan:    Recurrent UTI with gross hematuria and smoking history as below: Also stress urinary incontinence. To evaluate all of this we will set her up for cystoscopy and I will get a kidney bladder ultrasound with PVR because of the feeling of incomplete bladder emptying. Given her history of breast cancer in her sister I am hesitant to start topical estrogen at this time. Also she has not had an infection for almost 2 months now. Follow-up for cystoscopy. Chief Complaint     Chief Complaint   Patient presents with   • Urinary Tract Infection       History of Present Illness   New patient office visit: Recurrent UTI: 80-year-old woman with a history of carcinoma the gallbladder-history of CVA 2 to 3 years ago with memory loss and numbness and tingling of the right upper extremity particularly the hand. Did not notice any change when she had the stroke in terms of her bladder function. This problem started this year in . She has had urinary tract infections over the years but nothing with this frequency. Urine cultures showed positivity 2023, 2023, 2023, and it was - 2022. She had Citrobacter koseri , E. coli 10-19,000 colonies , greater than 100,000 colonies of Proteus 2023. She has a drug allergies but she does not remember the exact reactions except for sulfa drugs which gives her hives and itching. Symptoms of UTI are dysuria that radiates up to her abdomen and gross hematuria, painful urination. These cleared up with antibiotics. Urinalysis today is negative and she has no symptoms.   Last time she had symptoms was the UTI in August.  No diarrhea, chronically or now. She has quite a bit of stress incontinence with coughing lifting something up or going for a walk. No major problems with urge incontinence. She does get a feeling of incomplete bladder emptying. CT scan of the chest abdomen pelvis for follow-up of her bladder carcinoma showed normal kidneys and bladder with no stones or other abnormalities December 9, 2022. She smoked a pack a day from age 25 up until the point where she does not remember but it was years ago when she stopped. She has a sister who had breast cancer. She has a prescription for acyclovir which she takes as needed for itching and burning but no known history of herpes. She has her uterus and ovaries. She is not sexually active. The following portions of the patient's history were reviewed and updated as appropriate: allergies, current medications, past family history, past medical history, past social history, past surgical history and problem list.  Past Medical History:   Diagnosis Date   • Cancer ([address] [facility])     gallbladder   • COVID-19    • GERD (gastroesophageal reflux disease)    • Glaucoma    • History of gallbladder cancer     Approx. 3 years ago   • Hyperlipemia    • Lightheadedness 5/3/2021   • Murmur    • Rheumatic fever    • RUQ abdominal pain 5/3/2021     Past Surgical History:   Procedure Laterality Date   • CHOLECYSTECTOMY     • TONSILLECTOMY  CHILDHOOD     shoulder  Review of Systems   Review of Systems   Constitutional: Negative for fever. Respiratory: Negative for shortness of breath. Cardiovascular: Negative for chest pain. Gastrointestinal: Negative for diarrhea. Genitourinary: Positive for hematuria. Negative for frequency, vaginal bleeding and vaginal discharge.        Active Problem List     Patient Active Problem List   Diagnosis   • Depression, recurrent ([address])   • Seasonal allergies   • Primary gall bladder adenocarcinoma (HCC)   • Pure hypertriglyceridemia   • Diverticulosis   • Murmur   • SOLORZANO (dyspnea on exertion)   • COPD, group A, by GOLD 2017 classification ([address])   • History of CVA with residual deficit   • GERD (gastroesophageal reflux disease)   • Glaucoma   • Headache       Objective   /60 (BP Location: Right arm, Patient Position: Sitting, Cuff Size: Large)   Pulse 65   Temp 97.6 °F (36.4 °C) (Temporal)   Resp 16   Ht 5' 3" (1.6 m)   Wt 80.8 kg (178 lb 3.2 oz)   SpO2 96%   BMI 31.57 kg/m²     Physical Exam  Vitals reviewed. Constitutional:       Appearance: Normal appearance. HENT:      Head: Normocephalic and atraumatic. Eyes:      Extraocular Movements: Extraocular movements intact. Pulmonary:      Effort: Pulmonary effort is normal.   Musculoskeletal:         General: Normal range of motion. Cervical back: Normal range of motion. Skin:     Coloration: Skin is not jaundiced or pale. Neurological:      General: No focal deficit present. Mental Status: She is alert and oriented to person, place, and time. Psychiatric:         Mood and Affect: Mood normal.         Behavior: Behavior normal.         Thought Content: Thought content normal.         Judgment: Judgment normal.             Current Medications     Current Outpatient Medications:   •  acyclovir (ZOVIRAX) 5 % ointment, Apply 1 application.  topically daily as needed As directed, Disp: , Rfl:   •  albuterol (PROVENTIL HFA,VENTOLIN HFA) 90 mcg/act inhaler, Inhale 2 puffs every 6 (six) hours as needed for wheezing or shortness of breath, Disp: 18 g, Rfl: 0  •  aspirin (ECOTRIN LOW STRENGTH) 81 mg EC tablet, Take 1 tablet (81 mg total) by mouth daily, Disp: 90 tablet, Rfl: 0  •  azelastine (ASTELIN) 0.1 % nasal spray, 1 spray into each nostril 2 (two) times a day Use in each nostril as directed, Disp: 30 mL, Rfl: 3  •  Fluticasone-Salmeterol (Advair) 100-50 mcg/dose inhaler, Inhale 1 puff 2 (two) times a day Rinse mouth after use., Disp: 60 blister, Rfl: 5  •  ipratropium (ATROVENT) 0.03 % nasal spray, 2 sprays into each nostril 3 (three) times a day as needed for rhinitis, Disp: 30 mL, Rfl: 3  •  latanoprost (XALATAN) 0.005 % ophthalmic solution, Apply to eye, Disp: , Rfl:   •  magnesium Oxide (MAG-OX) 400 mg TABS, take 1 tablet by mouth twice a day, Disp: 30 tablet, Rfl: 3  •  meclizine (ANTIVERT) 25 mg tablet, Take 1 tablet (25 mg total) by mouth 3 (three) times a day as needed for dizziness, Disp: 30 tablet, Rfl: 0  •  omeprazole (PriLOSEC) 20 mg delayed release capsule, Take 1 capsule (20 mg total) by mouth daily before breakfast, Disp: 90 capsule, Rfl: 3  •  Riboflavin 400 MG TABS, Take 1 tablet (400 mg total) by mouth 2 (two) times a day, Disp: 180 tablet, Rfl: 0        Ferny Olivo MD

## 2023-10-02 NOTE — PATIENT INSTRUCTIONS
We will schedule a kidney and bladder ultrasound and set you up for cystoscopy in the near future. This is an outpatient procedure, no special preparation needed.

## 2023-10-03 ENCOUNTER — TELEPHONE (OUTPATIENT)
Dept: FAMILY MEDICINE CLINIC | Facility: CLINIC | Age: 73
End: 2023-10-03

## 2023-10-04 ENCOUNTER — HOSPITAL ENCOUNTER (OUTPATIENT)
Dept: ULTRASOUND IMAGING | Facility: HOSPITAL | Age: 73
Discharge: HOME | End: 2023-10-04
Attending: UROLOGY
Payer: COMMERCIAL

## 2023-10-04 DIAGNOSIS — N39.3 STRESS INCONTINENCE: ICD-10-CM

## 2023-10-04 DIAGNOSIS — N39.0 RECURRENT UTI: ICD-10-CM

## 2023-10-04 DIAGNOSIS — R31.0 GROSS HEMATURIA: ICD-10-CM

## 2023-10-04 PROCEDURE — 76770 US EXAM ABDO BACK WALL COMP: CPT

## 2023-10-13 ENCOUNTER — TELEPHONE (OUTPATIENT)
Dept: UROLOGY | Facility: CLINIC | Age: 73
End: 2023-10-13

## 2023-10-13 NOTE — TELEPHONE ENCOUNTER
----- Message from Sharleen Boas, MD sent at 10/12/2023  4:07 PM EDT -----    Please inform patient that the results are normal

## 2023-10-21 DIAGNOSIS — R25.2 MUSCLE CRAMPS: ICD-10-CM

## 2023-10-24 RX ORDER — LANOLIN ALCOHOL/MO/W.PET/CERES
CREAM (GRAM) TOPICAL
Qty: 30 TABLET | Refills: 3 | Status: SHIPPED | OUTPATIENT
Start: 2023-10-24

## 2023-11-01 DIAGNOSIS — K21.9 GASTROESOPHAGEAL REFLUX DISEASE WITHOUT ESOPHAGITIS: Primary | ICD-10-CM

## 2023-11-01 NOTE — TELEPHONE ENCOUNTER
Patient informed. Wants a new script sent to the pharmacy for the 40mg.  Sending to [facility] for approval.

## 2023-11-01 NOTE — TELEPHONE ENCOUNTER
Received call from patient stating for the last two weeks she is not feeling "right". C/o acid reflux, no appetite, feel that her mouth is "burning", vomiting, and diarrhea. She states she is taking Prilosec 20mg daily. Looking for recommendations. Patient has never seen GI before. Please advise.

## 2023-11-03 DIAGNOSIS — I63.9 STROKE (HCC): ICD-10-CM

## 2023-11-03 RX ORDER — RIBOFLAVIN (VITAMIN B2) 400 MG
1 TABLET ORAL 2 TIMES DAILY
Qty: 180 TABLET | Refills: 0 | Status: SHIPPED | OUTPATIENT
Start: 2023-11-03

## 2023-11-07 ENCOUNTER — OFFICE VISIT (OUTPATIENT)
Dept: FAMILY MEDICINE CLINIC | Facility: CLINIC | Age: 73
End: 2023-11-07
Payer: COMMERCIAL

## 2023-11-07 ENCOUNTER — TELEPHONE (OUTPATIENT)
Dept: FAMILY MEDICINE CLINIC | Facility: CLINIC | Age: 73
End: 2023-11-07

## 2023-11-07 VITALS
SYSTOLIC BLOOD PRESSURE: 116 MMHG | TEMPERATURE: 98.7 F | HEIGHT: 63 IN | HEART RATE: 67 BPM | DIASTOLIC BLOOD PRESSURE: 64 MMHG | OXYGEN SATURATION: 97 % | BODY MASS INDEX: 31.52 KG/M2 | WEIGHT: 177.9 LBS

## 2023-11-07 DIAGNOSIS — K21.9 GASTROESOPHAGEAL REFLUX DISEASE WITHOUT ESOPHAGITIS: Primary | ICD-10-CM

## 2023-11-07 DIAGNOSIS — R10.9 ABDOMINAL PAIN, UNSPECIFIED ABDOMINAL LOCATION: ICD-10-CM

## 2023-11-07 DIAGNOSIS — R19.7 DIARRHEA, UNSPECIFIED TYPE: ICD-10-CM

## 2023-11-07 DIAGNOSIS — R14.2 BELCHING SYMPTOM: ICD-10-CM

## 2023-11-07 DIAGNOSIS — K04.7 DENTAL ABSCESS: ICD-10-CM

## 2023-11-07 PROCEDURE — 99214 OFFICE O/P EST MOD 30 MIN: CPT | Performed by: NURSE PRACTITIONER

## 2023-11-07 RX ORDER — SUCRALFATE 1 G/1
1 TABLET ORAL 3 TIMES DAILY
Qty: 90 TABLET | Refills: 0 | Status: SHIPPED | OUTPATIENT
Start: 2023-11-07

## 2023-11-07 RX ORDER — OMEPRAZOLE 40 MG/1
40 CAPSULE, DELAYED RELEASE ORAL DAILY
Qty: 90 CAPSULE | Refills: 3 | Status: SHIPPED | OUTPATIENT
Start: 2023-11-07

## 2023-11-07 RX ORDER — AMOXICILLIN 500 MG/1
500 CAPSULE ORAL EVERY 12 HOURS SCHEDULED
Qty: 20 CAPSULE | Refills: 0 | Status: SHIPPED | OUTPATIENT
Start: 2023-11-07 | End: 2023-11-17

## 2023-11-07 NOTE — PROGRESS NOTES
1125  [facility]        NAME: Pako Ferreira is a 68 y.o. female  : 1950    MRN: [id number]  DATE: 2023  TIME: 1:18 PM    Assessment and Plan   Gastroesophageal reflux disease without esophagitis [K21.9]  1. Gastroesophageal reflux disease without esophagitis  sucralfate (CARAFATE) 1 g tablet    Ambulatory Referral to Gastroenterology      2. Belching symptom  sucralfate (CARAFATE) 1 g tablet    Ambulatory Referral to Gastroenterology      3. Abdominal pain, unspecified abdominal location  Ambulatory Referral to Gastroenterology      4. Diarrhea, unspecified type  Ambulatory Referral to Gastroenterology      5. Dental abscess  amoxicillin (AMOXIL) 500 mg capsule        1. Gastroesophageal reflux disease without esophagitis   Continue omeprazole and start Carafate.   - sucralfate (CARAFATE) 1 g tablet; Take 1 tablet (1 g total) by mouth 3 (three) times a day  Dispense: 90 tablet; Refill: 0  - Ambulatory Referral to Gastroenterology; Future    2. Belching symptom    - sucralfate (CARAFATE) 1 g tablet; Take 1 tablet (1 g total) by mouth 3 (three) times a day  Dispense: 90 tablet; Refill: 0  - Ambulatory Referral to Gastroenterology; Future    3. Abdominal pain, unspecified abdominal location    - Ambulatory Referral to Gastroenterology; Future    4. Diarrhea, unspecified type    - Ambulatory Referral to Gastroenterology; Future    5. Dental abscess    - amoxicillin (AMOXIL) 500 mg capsule; Take 1 capsule (500 mg total) by mouth every 12 (twelve) hours for 10 days  Dispense: 20 capsule; Refill: 0      Patient Instructions     There are no Patient Instructions on file for this visit. Chief Complaint     Chief Complaint   Patient presents with    GI Problem         History of Present Illness       Patient here for acute visit with complaints of abdominal pain starting about 2 weeks ago. Will happen every 3 days, only during the night.   Will get abdominal pain, get very hot, feels like she is going to pass out and then have diarrhea, sometimes mouth will water like she has to vomiting but never does. Reports feels better after this but a little discomfort. Has not been able to link a food trigger, hasn't changed diet and still getting the pain. Started taking 2 omeprazole at the same time about 1 week ago after calling the office. Has not noticed any improvement with this. Has been having toast with jelly, a few grapes or fruit through out the day. Denies new supplements/ vitamins. Review of Systems   Review of Systems   Constitutional: Negative. Negative for appetite change, fatigue and fever. Respiratory: Negative. Negative for shortness of breath and wheezing. Cardiovascular: Negative. Negative for chest pain and palpitations. Gastrointestinal:  Positive for abdominal pain, diarrhea and nausea. Negative for abdominal distention, blood in stool, constipation and vomiting. Musculoskeletal: Negative. Negative for arthralgias. Skin: Negative. Negative for rash. Neurological: Negative. Negative for dizziness, light-headedness and headaches. Psychiatric/Behavioral: Negative. Negative for decreased concentration. The patient is not nervous/anxious. PHQ-2/9 Depression Screening    Little interest or pleasure in doing things: 0 - not at all  Feeling down, depressed, or hopeless: 0 - not at all  Trouble falling or staying asleep, or sleeping too much: 0 - not at all  Feeling tired or having little energy: 0 - not at all  Poor appetite or overeatin - not at all  Feeling bad about yourself - or that you are a failure or have let yourself or your family down: 0 - not at all  Trouble concentrating on things, such as reading the newspaper or watching television: 0 - not at all  Moving or speaking so slowly that other people could have noticed.  Or the opposite - being so fidgety or restless that you have been moving around a lot more than usual: 0 - not at all  Thoughts that you would be better off dead, or of hurting yourself in some way: 0 - not at all  PHQ-9 Score: 0   PHQ-9 Interpretation: No or Minimal depression           Current Medications       Current Outpatient Medications:     acyclovir (ZOVIRAX) 5 % ointment, Apply 1 application.  topically daily as needed As directed, Disp: , Rfl:     albuterol (PROVENTIL HFA,VENTOLIN HFA) 90 mcg/act inhaler, Inhale 2 puffs every 6 (six) hours as needed for wheezing or shortness of breath, Disp: 18 g, Rfl: 0    amoxicillin (AMOXIL) 500 mg capsule, Take 1 capsule (500 mg total) by mouth every 12 (twelve) hours for 10 days, Disp: 20 capsule, Rfl: 0    aspirin (ECOTRIN LOW STRENGTH) 81 mg EC tablet, Take 1 tablet (81 mg total) by mouth daily, Disp: 90 tablet, Rfl: 0    azelastine (ASTELIN) 0.1 % nasal spray, 1 spray into each nostril 2 (two) times a day Use in each nostril as directed, Disp: 30 mL, Rfl: 3    ipratropium (ATROVENT) 0.03 % nasal spray, 2 sprays into each nostril 3 (three) times a day as needed for rhinitis, Disp: 30 mL, Rfl: 3    latanoprost (XALATAN) 0.005 % ophthalmic solution, Apply to eye, Disp: , Rfl:     magnesium Oxide (MAG-OX) 400 mg TABS, take 1 tablet by mouth twice a day, Disp: 30 tablet, Rfl: 3    meclizine (ANTIVERT) 25 mg tablet, Take 1 tablet (25 mg total) by mouth 3 (three) times a day as needed for dizziness, Disp: 30 tablet, Rfl: 0    omeprazole (PriLOSEC) 20 mg delayed release capsule, Take 1 capsule (20 mg total) by mouth daily before breakfast, Disp: 90 capsule, Rfl: 3    omeprazole (PriLOSEC) 40 MG capsule, Take 1 capsule (40 mg total) by mouth daily, Disp: 90 capsule, Rfl: 3    Riboflavin 400 MG TABS, TAKE ONE TABLET BY MOUTH TWICE A DAY, Disp: 180 tablet, Rfl: 0    sucralfate (CARAFATE) 1 g tablet, Take 1 tablet (1 g total) by mouth 3 (three) times a day, Disp: 90 tablet, Rfl: 0    Fluticasone-Salmeterol (Advair) 100-50 mcg/dose inhaler, Inhale 1 puff 2 (two) times a day Rinse mouth after use., Disp: 60 blister, Rfl: 5    Current Allergies     Allergies as of 11/07/2023 - Reviewed 11/07/2023   Allergen Reaction Noted    Ciprofloxacin  02/03/2020    Clindamycin  02/03/2020    Fluticasone-salmeterol Other (See Comments) 10/13/2020    Keflex [cephalexin]  02/03/2020    Naproxen Hives and Other (See Comments) 08/03/2017    Nsaids  10/04/2020    Sulfa antibiotics Hives, Itching, and Swelling 08/03/2017    Atorvastatin Myalgia 10/26/2022            The following portions of the patient's history were reviewed and updated as appropriate: allergies, current medications, past family history, past medical history, past social history, past surgical history and problem list.     Past Medical History:   Diagnosis Date    Cancer ([address])     gallbladder    COVID-19     GERD (gastroesophageal reflux disease)     Glaucoma     History of gallbladder cancer     Approx. 3 years ago    Hyperlipemia     Lightheadedness 5/3/2021    Murmur     Rheumatic fever     RUQ abdominal pain 5/3/2021       Past Surgical History:   Procedure Laterality Date    CHOLECYSTECTOMY      TONSILLECTOMY  CHILDHOOD       Family History   Problem Relation Age of Onset    Heart attack Mother     Kidney disease Father     Breast cancer Sister 77    No Known Problems Daughter     No Known Problems Maternal Grandmother     No Known Problems Paternal Grandmother     No Known Problems Sister     No Known Problems Maternal Aunt     No Known Problems Maternal Aunt     No Known Problems Maternal Aunt     No Known Problems Paternal Aunt     No Known Problems Paternal Aunt          Medications have been verified. Objective   /64   Pulse 67   Temp 98.7 °F (37.1 °C)   Ht 5' 3" (1.6 m)   Wt 80.7 kg (177 lb 14.4 oz)   SpO2 97%   BMI 31.51 kg/m²        Physical Exam     Physical Exam  Vitals and nursing note reviewed. Constitutional:       General: She is not in acute distress. Appearance: Normal appearance. She is well-developed. She is not ill-appearing. HENT:      Head: Normocephalic and atraumatic. Eyes:      General: Lids are normal.   Cardiovascular:      Rate and Rhythm: Normal rate and regular rhythm. Heart sounds: Normal heart sounds, S1 normal and S2 normal. No murmur heard. Pulmonary:      Effort: Pulmonary effort is normal. No respiratory distress. Breath sounds: Normal breath sounds. No decreased breath sounds or wheezing. Musculoskeletal:         General: No tenderness or deformity. Normal range of motion. Skin:     General: Skin is warm. Capillary Refill: Capillary refill takes less than 2 seconds. Findings: No erythema or rash. Neurological:      General: No focal deficit present. Mental Status: She is alert and oriented to person, place, and time. Psychiatric:         Behavior: Behavior normal. Behavior is cooperative. Thought Content:  Thought content normal.

## 2023-11-07 NOTE — TELEPHONE ENCOUNTER
Patient scheduled with GI on 12/12/23 at the [facility] location. Patient informed. She was asking if there is any blood work or anything she can get done in the meantime to check for cancer.

## 2023-11-09 NOTE — TELEPHONE ENCOUNTER
There really isn't anything else to get done, would recommend the colonoscopy and she has had CT abdomen in the last year and this was normal. Looks like mammogram and lung cancer screening is up to date.

## 2023-11-21 ENCOUNTER — CONSULT (OUTPATIENT)
Age: 73
End: 2023-11-21
Payer: COMMERCIAL

## 2023-11-21 VITALS
DIASTOLIC BLOOD PRESSURE: 76 MMHG | RESPIRATION RATE: 18 BRPM | HEART RATE: 74 BPM | HEIGHT: 63 IN | SYSTOLIC BLOOD PRESSURE: 110 MMHG | WEIGHT: 175 LBS | BODY MASS INDEX: 31.01 KG/M2 | OXYGEN SATURATION: 96 %

## 2023-11-21 DIAGNOSIS — R10.11 RUQ ABDOMINAL PAIN: ICD-10-CM

## 2023-11-21 DIAGNOSIS — R10.9 ABDOMINAL PAIN, UNSPECIFIED ABDOMINAL LOCATION: ICD-10-CM

## 2023-11-21 DIAGNOSIS — R14.2 BELCHING SYMPTOM: ICD-10-CM

## 2023-11-21 DIAGNOSIS — K21.9 GASTROESOPHAGEAL REFLUX DISEASE WITHOUT ESOPHAGITIS: ICD-10-CM

## 2023-11-21 DIAGNOSIS — R19.7 DIARRHEA, UNSPECIFIED TYPE: ICD-10-CM

## 2023-11-21 DIAGNOSIS — C23 PRIMARY GALL BLADDER ADENOCARCINOMA (HCC): Primary | ICD-10-CM

## 2023-11-21 DIAGNOSIS — K64.9 HEMORRHOIDS, UNSPECIFIED HEMORRHOID TYPE: ICD-10-CM

## 2023-11-21 PROCEDURE — 99204 OFFICE O/P NEW MOD 45 MIN: CPT | Performed by: INTERNAL MEDICINE

## 2023-11-21 NOTE — PROGRESS NOTES
[facility]  Outpatient Consult  Encounter: [id number]    PATIENT INFO     Name: Ruslan Stockton  YOB: 1950   Age: 68 y.o. Sex: female   MRN: [id number]    ASSESSMENT & PLAN     Ruslan Stockton is a 68 y.o. female with past medical history significant for gallbladder adenocarcinoma diagnosed in 2017, GERD, diverticulosis, COPD, depression, seasonal allergies, history of CVA who presents to GI office for concern of right upper quadrant abdominal pain with associated diaphoresis, nausea. Diagnoses and all orders for this visit:    Primary gall bladder adenocarcinoma [facility])  Patient with severe RUQ abdominal pain with history of abdominal surgeries/radiation for management of primary gall bladder adenoCA. Now with acute onset severe R sided upper quadrant abdominal pain. Undergoing annual imaging with oncology for monitoring. Will repeat CT with contrast now to evaluate RUQ abdominal pain as well as surveillance imaging  -     CT abdomen pelvis w contrast; Future    Gastroesophageal reflux disease without esophagitis  Continue omeprazole 40mg daily and Carafate as needed. Should pain recur and testing as detailed below be negative can consider EGD for further evaluation  -     Ambulatory Referral to Gastroenterology    RUQ abdominal pain  Patient with 2-3 nights of severe RUQ abdominal pain (similar to previous postop, however, with prolonged course and increased intensity). Since this time, no recurrence in pain.    Unclear etiology - consider adhesive disease vs. SBO vs. PUD vs. Spincter of Oddi dysfunction   Would recommend CT now for further evaluation in setting of prior malignancy/radiation to rule out recurrence or structural cause  Will order lipase and hepatic function panel to be done within 24 hours should pain recur  Consider EGD if above testing normal and pain recurs  If no recurrence and negative imaging, can continue to monitor  Will arrange follow up in 3 months, however, if pain recurs prior advised patient to call and let us know for earlier apt/further recommendations  -     CT abdomen pelvis w contrast; Future  -     Hepatic function panel; Future  -     Lipase; Future    FOLLOW-UP: 3 months    HISTORY OF PRESENT ILLNESS       Emma Mcgarry is a 68 y.o. female with past medical history significant for gallbladder adenocarcinoma diagnosed in 2017, GERD, diverticulosis, COPD, depression, seasonal allergies, history of CVA who presents to GI office for concern of right upper quadrant abdominal pain. Patient reports that approximately 3 weeks ago she developed severe right upper quadrant abdominal pain with associated diaphoresis, nausea, increased elevation, lower extremity numbness. She reports at that time she was straining to have a bowel movement and once she was able to her pain did improve. This occurred multiple times per night over the course of 2 to 3 days. Since then she has not had recurrence of symptoms. She does state that the right upper quadrant abdominal pain was similar to previous which she has had intermittently since her prior cholecystectomy, partial hepatectomy, and portal lymphadenectomy. Currently she is pain free. Patient does report she has had prior EGD, however, no report available for review. Undergoes regular Cologuard screening.      ENDOSCOPIC HISTORY     UPPER ENDOSCOPY: reports she has had previously but does not remember when, no report available to review  COLONOSCOPY: cologuard 2021 - negative    REVIEW OF SYSTEMS     CONSTITUTIONAL: Denies any fever, chills, rigors, and weight loss  HEENT: No earache or tinnitus, denies hearing loss or visual disturbances  CARDIOVASCULAR: No chest pain or palpitations  RESPIRATORY: Denies any cough, hemoptysis, shortness of breath or dyspnea on exertion  GASTROINTESTINAL: As noted in the History of Present Illness  GENITOURINARY: No problems with urination, denies any hematuria or dysuria  NEUROLOGIC: No dizziness or vertigo, denies headaches   MUSCULOSKELETAL: Denies any muscle or joint pain   SKIN: Denies skin rashes or itching  ENDOCRINE: Denies excessive thirst, denies intolerance to heat or cold  PSYCHOSOCIAL: Denies depression or anxiety, denies any recent memory loss     Historical Information   Past Medical History:   Diagnosis Date    Cancer ([facility])     gallbladder    COVID-19     GERD (gastroesophageal reflux disease)     Glaucoma     History of gallbladder cancer     Approx. 3 years ago    Hyperlipemia     Lightheadedness 5/3/2021    Murmur     Rheumatic fever     RUQ abdominal pain 5/3/2021     Past Surgical History:   Procedure Laterality Date    CHOLECYSTECTOMY      TONSILLECTOMY  CHILDHOOD     Social History   Social History     Substance and Sexual Activity   Alcohol Use Never     Social History     Substance and Sexual Activity   Drug Use Never     Social History     Tobacco Use   Smoking Status Former    Packs/day: 1.00    Years: 35.00    Total pack years: 35.00    Types: Cigarettes    Start date: 3/10/1972    Quit date: 3/10/2012    Years since quittin.7   Smokeless Tobacco Never     Family History   Problem Relation Age of Onset    Heart attack Mother     Kidney disease Father     Breast cancer Sister 77    No Known Problems Daughter     No Known Problems Maternal Grandmother     No Known Problems Paternal Grandmother     No Known Problems Sister     No Known Problems Maternal Aunt     No Known Problems Maternal Aunt     No Known Problems Maternal Aunt     No Known Problems Paternal Aunt     No Known Problems Paternal Aunt          MEDICATIONS AND ALLERGIES     Current Outpatient Medications   Medication Instructions    acyclovir (ZOVIRAX) 5 % ointment 1 application. , Topical, Daily PRN, As directed    albuterol (PROVENTIL HFA,VENTOLIN HFA) 90 mcg/act inhaler 2 puffs, Inhalation, Every 6 hours PRN    aspirin (ECOTRIN LOW STRENGTH) 81 mg, Oral, Daily    azelastine (ASTELIN) 0.1 % nasal spray 1 spray, Nasal, 2 times daily, Use in each nostril as directed    Fluticasone-Salmeterol (Advair) 100-50 mcg/dose inhaler 1 puff, Inhalation, 2 times daily, Rinse mouth after use. ipratropium (ATROVENT) 0.03 % nasal spray 2 sprays, Nasal, 3 times daily PRN    latanoprost (XALATAN) 0.005 % ophthalmic solution Ophthalmic    magnesium Oxide (MAG-OX) 400 mg TABS take 1 tablet by mouth twice a day    meclizine (ANTIVERT) 25 mg, Oral, 3 times daily PRN    omeprazole (PRILOSEC) 20 mg, Oral, Daily before breakfast    omeprazole (PRILOSEC) 40 mg, Oral, Daily    Riboflavin 400 mg, Oral, 2 times daily    sucralfate (CARAFATE) 1 g, Oral, 3 times daily     Allergies   Allergen Reactions    Ciprofloxacin     Clindamycin     Fluticasone-Salmeterol Other (See Comments)     Leg cramps    Keflex [Cephalexin]     Naproxen Hives and Other (See Comments)     "hives & chest pain"     Nsaids     Sulfa Antibiotics Hives, Itching and Swelling    Atorvastatin Myalgia       PHYSICAL EXAM      Objective   Blood pressure 110/76, pulse 74, resp. rate 18, height 5' 3" (1.6 m), weight 79.4 kg (175 lb), SpO2 96 %. Body mass index is 31 kg/m². General Appearance:   Alert, cooperative, no distress   HEENT:   Normocephalic, atraumatic, anicteric     Neck:   Supple, symmetrical, trachea midline   Lungs:   Equal chest rise, respirations unlabored    Heart:   Regular rate and rhythm   Abdomen:   Soft, non-tender, non-distended; normal bowel sounds; no masses, no organomegaly    Rectal:   Deferred    Extremities:   No cyanosis, clubbing or edema    Neuro: Moves all 4 extremities    Skin:   No jaundice, rashes, or lesions      LABORATORY RESULTS     No visits with results within 1 Day(s) from this visit.    Latest known visit with results is:   Consult on 10/02/2023   Component Date Value    LEUKOCYTE ESTERASE,UA 10/02/2023 -     NITRITE,UA 10/02/2023 -     SL AMB POCT UROBILINOGEN 10/02/2023 0.2     POCT URINE PROTEIN 10/02/2023 -      PH,UA 10/02/2023 0.0     BLOOD,UA 10/02/2023 -     SPECIFIC GRAVITY,UA 10/02/2023 1.015     KETONES,UA 10/02/2023 -     BILIRUBIN,UA 10/02/2023 -     GLUCOSE, UA 10/02/2023 -      COLOR,UA 10/02/2023 Yellow     CLARITY,UA 10/02/2023 Clear      No results found. RADIOLOGY RESULTS: I have personally reviewed pertinent imaging studies. Jaqui Ramos D.O. Gastroenterology Fellow  [address]  Division of Gastroenterology & Hepatology  Available on [employer]    ** Please Note: This note is constructed using a voice recognition dictation system.  **

## 2023-11-25 ENCOUNTER — APPOINTMENT (OUTPATIENT)
Dept: LAB | Facility: CLINIC | Age: 73
End: 2023-11-25
Payer: COMMERCIAL

## 2023-11-25 DIAGNOSIS — R73.03 PREDIABETES: ICD-10-CM

## 2023-11-25 DIAGNOSIS — R10.11 RUQ ABDOMINAL PAIN: ICD-10-CM

## 2023-11-25 DIAGNOSIS — E78.1 PURE HYPERTRIGLYCERIDEMIA: ICD-10-CM

## 2023-11-25 LAB
ALBUMIN SERPL BCP-MCNC: 4.2 G/DL (ref 3.5–5)
ALP SERPL-CCNC: 86 U/L (ref 34–104)
ALT SERPL W P-5'-P-CCNC: 13 U/L (ref 7–52)
AST SERPL W P-5'-P-CCNC: 18 U/L (ref 13–39)
BILIRUB DIRECT SERPL-MCNC: 0.06 MG/DL (ref 0–0.2)
BILIRUB SERPL-MCNC: 0.65 MG/DL (ref 0.2–1)
LIPASE SERPL-CCNC: 7 U/L (ref 11–82)
PROT SERPL-MCNC: 7.4 G/DL (ref 6.4–8.4)

## 2023-11-25 PROCEDURE — 36415 COLL VENOUS BLD VENIPUNCTURE: CPT

## 2023-11-25 PROCEDURE — 83690 ASSAY OF LIPASE: CPT

## 2023-11-25 PROCEDURE — 80076 HEPATIC FUNCTION PANEL: CPT

## 2023-11-28 ENCOUNTER — APPOINTMENT (OUTPATIENT)
Dept: LAB | Facility: HOSPITAL | Age: 73
End: 2023-11-28
Payer: COMMERCIAL

## 2023-11-28 ENCOUNTER — HOSPITAL ENCOUNTER (OUTPATIENT)
Dept: CT IMAGING | Facility: HOSPITAL | Age: 73
Discharge: HOME | End: 2023-11-28
Payer: COMMERCIAL

## 2023-11-28 DIAGNOSIS — R10.11 RUQ ABDOMINAL PAIN: ICD-10-CM

## 2023-11-28 DIAGNOSIS — C23 PRIMARY GALL BLADDER ADENOCARCINOMA (HCC): ICD-10-CM

## 2023-11-28 LAB
ALBUMIN SERPL BCP-MCNC: 4.4 G/DL (ref 3.5–5)
ALP SERPL-CCNC: 91 U/L (ref 34–104)
ALT SERPL W P-5'-P-CCNC: 13 U/L (ref 7–52)
ANION GAP SERPL CALCULATED.3IONS-SCNC: 7 MMOL/L
AST SERPL W P-5'-P-CCNC: 18 U/L (ref 13–39)
BILIRUB SERPL-MCNC: 0.69 MG/DL (ref 0.2–1)
BUN SERPL-MCNC: 17 MG/DL (ref 5–25)
CALCIUM SERPL-MCNC: 9.4 MG/DL (ref 8.4–10.2)
CHLORIDE SERPL-SCNC: 104 MMOL/L (ref 96–108)
CO2 SERPL-SCNC: 27 MMOL/L (ref 21–32)
CREAT SERPL-MCNC: 0.71 MG/DL (ref 0.6–1.3)
GFR SERPL CREATININE-BSD FRML MDRD: 85 ML/MIN/1.73SQ M
GLUCOSE SERPL-MCNC: 93 MG/DL (ref 65–140)
POTASSIUM SERPL-SCNC: 4.2 MMOL/L (ref 3.5–5.3)
PROT SERPL-MCNC: 7.5 G/DL (ref 6.4–8.4)
SODIUM SERPL-SCNC: 138 MMOL/L (ref 135–147)

## 2023-11-28 PROCEDURE — G1004 CDSM NDSC: HCPCS

## 2023-11-28 PROCEDURE — 80053 COMPREHEN METABOLIC PANEL: CPT

## 2023-11-28 PROCEDURE — 74177 CT ABD & PELVIS W/CONTRAST: CPT

## 2023-11-28 PROCEDURE — 36415 COLL VENOUS BLD VENIPUNCTURE: CPT

## 2023-11-28 RX ADMIN — IOHEXOL 100 ML: 350 INJECTION, SOLUTION INTRAVENOUS at 15:39

## 2023-11-30 DIAGNOSIS — K21.9 GASTROESOPHAGEAL REFLUX DISEASE WITHOUT ESOPHAGITIS: ICD-10-CM

## 2023-11-30 DIAGNOSIS — R14.2 BELCHING SYMPTOM: ICD-10-CM

## 2023-11-30 RX ORDER — SUCRALFATE 1 G/1
1 TABLET ORAL 3 TIMES DAILY
Qty: 90 TABLET | Refills: 0 | Status: SHIPPED | OUTPATIENT
Start: 2023-11-30

## 2023-12-05 NOTE — PHYSICAL THERAPY NOTE
Physical Therapy Cancellation Note         09/18/21 0900   PT Last Visit   PT Visit Date 09/18/21   Note Type   Note type Evaluation   Cancel Reasons Medical status   Cognition   Orientation Level Oriented X4     PT orders received and chart reviewed  Pt noted to be given tPA 9/17/21 and on active bed rest orders for 24 hours  Will hold PT evaluation at this time  Will continue to follow and initiate PT evaluation as appropriate      Sharon Narvaez, PT, DPT If you are a smoker, it is important for your health to stop smoking. Please be aware that second hand smoke is also harmful.

## 2023-12-08 ENCOUNTER — RA CDI HCC (OUTPATIENT)
Dept: OTHER | Facility: HOSPITAL | Age: 73
End: 2023-12-08

## 2023-12-08 NOTE — PROGRESS NOTES
[address] coding opportunities       Chart reviewed, no opportunity found: [address] Review     Patients Insurance     Medicare Insurance: [employer]

## 2023-12-14 DIAGNOSIS — R25.2 MUSCLE CRAMPS: ICD-10-CM

## 2023-12-14 RX ORDER — LANOLIN ALCOHOL/MO/W.PET/CERES
CREAM (GRAM) TOPICAL
Qty: 30 TABLET | Refills: 3 | Status: SHIPPED | OUTPATIENT
Start: 2023-12-14

## 2023-12-18 ENCOUNTER — APPOINTMENT (OUTPATIENT)
Dept: LAB | Facility: CLINIC | Age: 73
End: 2023-12-18
Payer: COMMERCIAL

## 2023-12-18 ENCOUNTER — RA CDI HCC (OUTPATIENT)
Dept: OTHER | Facility: HOSPITAL | Age: 73
End: 2023-12-18

## 2023-12-18 LAB
CHOLEST SERPL-MCNC: 167 MG/DL
EST. AVERAGE GLUCOSE BLD GHB EST-MCNC: 123 MG/DL
HBA1C MFR BLD: 5.9 %
HDLC SERPL-MCNC: 48 MG/DL
LDLC SERPL CALC-MCNC: 95 MG/DL (ref 0–100)
NONHDLC SERPL-MCNC: 119 MG/DL
TRIGL SERPL-MCNC: 118 MG/DL

## 2023-12-18 PROCEDURE — 80061 LIPID PANEL: CPT

## 2023-12-18 PROCEDURE — 83036 HEMOGLOBIN GLYCOSYLATED A1C: CPT

## 2023-12-19 ENCOUNTER — OFFICE VISIT (OUTPATIENT)
Dept: FAMILY MEDICINE CLINIC | Facility: CLINIC | Age: 73
End: 2023-12-19

## 2023-12-19 VITALS
DIASTOLIC BLOOD PRESSURE: 70 MMHG | TEMPERATURE: 97.1 F | WEIGHT: 172.8 LBS | HEIGHT: 63 IN | OXYGEN SATURATION: 99 % | HEART RATE: 61 BPM | SYSTOLIC BLOOD PRESSURE: 118 MMHG | BODY MASS INDEX: 30.62 KG/M2 | RESPIRATION RATE: 18 BRPM

## 2023-12-19 DIAGNOSIS — E78.1 PURE HYPERTRIGLYCERIDEMIA: ICD-10-CM

## 2023-12-19 DIAGNOSIS — K21.9 GASTROESOPHAGEAL REFLUX DISEASE WITHOUT ESOPHAGITIS: ICD-10-CM

## 2023-12-19 DIAGNOSIS — R73.03 PREDIABETES: ICD-10-CM

## 2023-12-19 DIAGNOSIS — F17.210 SMOKING GREATER THAN 40 PACK YEARS: ICD-10-CM

## 2023-12-19 DIAGNOSIS — Z12.2 SCREENING FOR LUNG CANCER: ICD-10-CM

## 2023-12-19 DIAGNOSIS — Z12.31 SCREENING MAMMOGRAM FOR BREAST CANCER: ICD-10-CM

## 2023-12-19 DIAGNOSIS — R91.8 MULTIPLE NODULES OF LUNG: ICD-10-CM

## 2023-12-19 DIAGNOSIS — Z00.00 ENCOUNTER FOR MEDICARE ANNUAL WELLNESS EXAM: Primary | ICD-10-CM

## 2023-12-19 NOTE — PATIENT INSTRUCTIONS
Can take hylands leg cramps. Can get at the pharmacy  If lymph node in neck gets larger, we will need to get an ultrasound.   Medicare Preventive Visit Patient Instructions  Thank you for completing your Welcome to Medicare Visit or Medicare Annual Wellness Visit today. Your next wellness visit will be due in one year (12/19/2024).  The screening/preventive services that you may require over the next 5-10 years are detailed below. Some tests may not apply to you based off risk factors and/or age. Screening tests ordered at today's visit but not completed yet may show as past due. Also, please note that scanned in results may not display below.  Preventive Screenings:  Service Recommendations Previous Testing/Comments   Colorectal Cancer Screening  * Colonoscopy    * Fecal Occult Blood Test (FOBT)/Fecal Immunochemical Test (FIT)  * Fecal DNA/Cologuard Test  * Flexible Sigmoidoscopy Age: 45-75 years old   Colonoscopy: every 10 years (may be performed more frequently if at higher risk)  OR  FOBT/FIT: every 1 year  OR  Cologuard: every 3 years  OR  Sigmoidoscopy: every 5 years  Screening may be recommended earlier than age 45 if at higher risk for colorectal cancer. Also, an individualized decision between you and your healthcare provider will decide whether screening between the ages of 76-85 would be appropriate. Colonoscopy: 12/17/2021  FOBT/FIT: Not on file  Cologuard: 12/17/2021  Sigmoidoscopy: Not on file          Breast Cancer Screening Age: 40+ years old  Frequency: every 1-2 years  Not required if history of left and right mastectomy Mammogram: 12/15/2022        Cervical Cancer Screening Between the ages of 21-29, pap smear recommended once every 3 years.   Between the ages of 30-65, can perform pap smear with HPV co-testing every 5 years.   Recommendations may differ for women with a history of total hysterectomy, cervical cancer, or abnormal pap smears in past. Pap Smear: Not on file        Hepatitis C  Screening Once for adults born between 1945 and 1965  More frequently in patients at high risk for Hepatitis C Hep C Antibody: Not on file        Diabetes Screening 1-2 times per year if you're at risk for diabetes or have pre-diabetes Fasting glucose: 105 mg/dL (6/20/2023)  A1C: 5.9 % (12/18/2023)      Cholesterol Screening Once every 5 years if you don't have a lipid disorder. May order more often based on risk factors. Lipid panel: 12/18/2023          Other Preventive Screenings Covered by Medicare:  Abdominal Aortic Aneurysm (AAA) Screening: covered once if your at risk. You're considered to be at risk if you have a family history of AAA.  Lung Cancer Screening: covers low dose CT scan once per year if you meet all of the following conditions: (1) Age 55-77; (2) No signs or symptoms of lung cancer; (3) Current smoker or have quit smoking within the last 15 years; (4) You have a tobacco smoking history of at least 20 pack years (packs per day multiplied by number of years you smoked); (5) You get a written order from a healthcare provider.  Glaucoma Screening: covered annually if you're considered high risk: (1) You have diabetes OR (2) Family history of glaucoma OR (3)  aged 50 and older OR (4)  American aged 65 and older  Osteoporosis Screening: covered every 2 years if you meet one of the following conditions: (1) You're estrogen deficient and at risk for osteoporosis based off medical history and other findings; (2) Have a vertebral abnormality; (3) On glucocorticoid therapy for more than 3 months; (4) Have primary hyperparathyroidism; (5) On osteoporosis medications and need to assess response to drug therapy.   Last bone density test (DXA Scan): 12/13/2021.  HIV Screening: covered annually if you're between the age of 15-65. Also covered annually if you are younger than 15 and older than 65 with risk factors for HIV infection. For pregnant patients, it is covered up to 3 times per  pregnancy.    Immunizations:  Immunization Recommendations   Influenza Vaccine Annual influenza vaccination during flu season is recommended for all persons aged >= 6 months who do not have contraindications   Pneumococcal Vaccine   * Pneumococcal conjugate vaccine = PCV13 (Prevnar 13), PCV15 (Vaxneuvance), PCV20 (Prevnar 20)  * Pneumococcal polysaccharide vaccine = PPSV23 (Pneumovax) Adults 19-63 yo with certain risk factors or if 65+ yo  If never received any pneumonia vaccine: recommend Prevnar 20 (PCV20)  Give PCV20 if previously received 1 dose of PCV13 or PPSV23   Hepatitis B Vaccine 3 dose series if at intermediate or high risk (ex: diabetes, end stage renal disease, liver disease)   Respiratory syncytial virus (RSV) Vaccine - COVERED BY MEDICARE PART D  * RSVPreF3 (Arexvy) CDC recommends that adults 60 years of age and older may receive a single dose of RSV vaccine using shared clinical decision-making (SCDM)   Tetanus (Td) Vaccine - COST NOT COVERED BY MEDICARE PART B Following completion of primary series, a booster dose should be given every 10 years to maintain immunity against tetanus. Td may also be given as tetanus wound prophylaxis.   Tdap Vaccine - COST NOT COVERED BY MEDICARE PART B Recommended at least once for all adults. For pregnant patients, recommended with each pregnancy.   Shingles Vaccine (Shingrix) - COST NOT COVERED BY MEDICARE PART B  2 shot series recommended in those 19 years and older who have or will have weakened immune systems or those 50 years and older     Health Maintenance Due:      Topic Date Due    Hepatitis C Screening  Never done    Lung Cancer Screening  12/09/2023    Breast Cancer Screening: Mammogram  12/15/2023    Colorectal Cancer Screening  12/17/2024     Immunizations Due:      Topic Date Due    COVID-19 Vaccine (1) Never done    Pneumococcal Vaccine: 65+ Years (1 - PCV) Never done    Influenza Vaccine (1) 09/01/2023     Advance Directives   What are advance  directives?  Advance directives are legal documents that state your wishes and plans for medical care. These plans are made ahead of time in case you lose your ability to make decisions for yourself. Advance directives can apply to any medical decision, such as the treatments you want, and if you want to donate organs.   What are the types of advance directives?  There are many types of advance directives, and each state has rules about how to use them. You may choose a combination of any of the following:  Living will:  This is a written record of the treatment you want. You can also choose which treatments you do not want, which to limit, and which to stop at a certain time. This includes surgery, medicine, IV fluid, and tube feedings.   Durable power of  for healthcare (DPAHC):  This is a written record that states who you want to make healthcare choices for you when you are unable to make them for yourself. This person, called a proxy, is usually a family member or a friend. You may choose more than 1 proxy.  Do not resuscitate (DNR) order:  A DNR order is used in case your heart stops beating or you stop breathing. It is a request not to have certain forms of treatment, such as CPR. A DNR order may be included in other types of advance directives.  Medical directive:  This covers the care that you want if you are in a coma, near death, or unable to make decisions for yourself. You can list the treatments you want for each condition. Treatment may include pain medicine, surgery, blood transfusions, dialysis, IV or tube feedings, and a ventilator (breathing machine).  Values history:  This document has questions about your views, beliefs, and how you feel and think about life. This information can help others choose the care that you would choose.  Why are advance directives important?  An advance directive helps you control your care. Although spoken wishes may be used, it is better to have your wishes  written down. Spoken wishes can be misunderstood, or not followed. Treatments may be given even if you do not want them. An advance directive may make it easier for your family to make difficult choices about your care.   Weight Management   Why it is important to manage your weight:  Being overweight increases your risk of health conditions such as heart disease, high blood pressure, type 2 diabetes, and certain types of cancer. It can also increase your risk for osteoarthritis, sleep apnea, and other respiratory problems. Aim for a slow, steady weight loss. Even a small amount of weight loss can lower your risk of health problems.  How to lose weight safely:  A safe and healthy way to lose weight is to eat fewer calories and get regular exercise. You can lose up about 1 pound a week by decreasing the number of calories you eat by 500 calories each day.   Healthy meal plan for weight management:  A healthy meal plan includes a variety of foods, contains fewer calories, and helps you stay healthy. A healthy meal plan includes the following:  Eat whole-grain foods more often.  A healthy meal plan should contain fiber. Fiber is the part of grains, fruits, and vegetables that is not broken down by your body. Whole-grain foods are healthy and provide extra fiber in your diet. Some examples of whole-grain foods are whole-wheat breads and pastas, oatmeal, brown rice, and bulgur.  Eat a variety of vegetables every day.  Include dark, leafy greens such as spinach, kale, donal greens, and mustard greens. Eat yellow and orange vegetables such as carrots, sweet potatoes, and winter squash.   Eat a variety of fruits every day.  Choose fresh or canned fruit (canned in its own juice or light syrup) instead of juice. Fruit juice has very little or no fiber.  Eat low-fat dairy foods.  Drink fat-free (skim) milk or 1% milk. Eat fat-free yogurt and low-fat cottage cheese. Try low-fat cheeses such as mozzarella and other reduced-fat  cheeses.  Choose meat and other protein foods that are low in fat.  Choose beans or other legumes such as split peas or lentils. Choose fish, skinless poultry (chicken or turkey), or lean cuts of red meat (beef or pork). Before you cook meat or poultry, cut off any visible fat.   Use less fat and oil.  Try baking foods instead of frying them. Add less fat, such as margarine, sour cream, regular salad dressing and mayonnaise to foods. Eat fewer high-fat foods. Some examples of high-fat foods include french fries, doughnuts, ice cream, and cakes.  Eat fewer sweets.  Limit foods and drinks that are high in sugar. This includes candy, cookies, regular soda, and sweetened drinks.  Exercise:  Exercise at least 30 minutes per day on most days of the week. Some examples of exercise include walking, biking, dancing, and swimming. You can also fit in more physical activity by taking the stairs instead of the elevator or parking farther away from stores. Ask your healthcare provider about the best exercise plan for you.      © Copyright [employer] 2018 Information is for End User's use only and may not be sold, redistributed or otherwise used for commercial purposes. All illustrations and images included in CareNotes® are the copyrighted property of [employer]., [employer] or [employer]

## 2023-12-19 NOTE — PROGRESS NOTES
Assessment and Plan:     Problem List Items Addressed This Visit    None  Visit Diagnoses       Encounter for Medicare annual wellness exam    -  Primary    Screening mammogram for breast cancer        Relevant Orders    Mammo screening bilateral w 3d & cad    Screening for lung cancer        Relevant Orders    CT lung screening program    Multiple nodules of lung        Relevant Orders    CT lung screening program    Smoking greater than 40 pack years        Relevant Orders    CT lung screening program          BMI Counseling: Body mass index is 30.61 kg/m². The BMI is above normal. Nutrition recommendations include decreasing portion sizes, encouraging healthy choices of fruits and vegetables, limiting drinks that contain sugar, moderation in carbohydrate intake, reducing intake of saturated and trans fat and reducing intake of cholesterol. Exercise recommendations include exercising 3-5 times per week. Rationale for BMI follow-up plan is due to patient being overweight or obese.       Preventive health issues were discussed with patient, and age appropriate screening tests were ordered as noted in patient's After Visit Summary.  Personalized health advice and appropriate referrals for health education or preventive services given if needed, as noted in patient's After Visit Summary.     History of Present Illness:     Patient presents for a Medicare Wellness Visit    Patient here for wellness visit with c/o pain in RUQ intermittently. Had recent CT scan that was normal.        Patient Care Team:  PAYAL Foreman as PCP - General (Family Medicine)  Tiago Chahal DO as PCP - PCP-[facility] ([facility])  Tiago Chahal DO as PCP - PCP-[facility] ([facility])  Caleb Diggs MD (Urology)     Review of Systems:     Review of Systems   Constitutional: Negative.  Negative for fatigue and fever.   Respiratory: Negative.  Negative for shortness of breath and wheezing.    Cardiovascular: Negative.  Negative for chest  pain and palpitations.   Gastrointestinal: Negative.  Negative for diarrhea and nausea.   Musculoskeletal: Negative.    Skin: Negative.  Negative for rash.   Neurological: Negative.  Negative for dizziness, light-headedness and headaches.   Psychiatric/Behavioral: Negative.  Negative for dysphoric mood. The patient is not nervous/anxious.         Problem List:     Patient Active Problem List   Diagnosis    Depression, recurrent (HCC)    Seasonal allergies    Primary gall bladder adenocarcinoma (HCC)    Pure hypertriglyceridemia    Diverticulosis    Murmur    SOLORZANO (dyspnea on exertion)    COPD, group A, by GOLD 2017 classification ([facility])    History of CVA with residual deficit    GERD (gastroesophageal reflux disease)    Glaucoma    Headache      Past Medical and Surgical History:     Past Medical History:   Diagnosis Date    Cancer (HCC)     gallbladder    COVID-19     GERD (gastroesophageal reflux disease)     Glaucoma     History of gallbladder cancer     Approx. 3 years ago    Hyperlipemia     Lightheadedness 5/3/2021    Murmur     Rheumatic fever     RUQ abdominal pain 5/3/2021     Past Surgical History:   Procedure Laterality Date    CHOLECYSTECTOMY      TONSILLECTOMY  CHILDHOOD      Family History:     Family History   Problem Relation Age of Onset    Heart attack Mother     Kidney disease Father     Breast cancer Sister 66    No Known Problems Daughter     No Known Problems Maternal Grandmother     No Known Problems Paternal Grandmother     No Known Problems Sister     No Known Problems Maternal Aunt     No Known Problems Maternal Aunt     No Known Problems Maternal Aunt     No Known Problems Paternal Aunt     No Known Problems Paternal Aunt       Social History:     Social History     Socioeconomic History    Marital status: /Civil Union     Spouse name: None    Number of children: None    Years of education: None    Highest education level: None   Occupational History    None   Tobacco Use    Smoking  status: Former     Current packs/day: 0.00     Average packs/day: 1 pack/day for 40.0 years (40.0 ttl pk-yrs)     Types: Cigarettes     Start date: 3/10/1972     Quit date: 3/10/2012     Years since quittin.7    Smokeless tobacco: Never   Vaping Use    Vaping status: Never Used   Substance and Sexual Activity    Alcohol use: Never    Drug use: Never    Sexual activity: None   Other Topics Concern    None   Social History Narrative    None     Social Determinants of Health     Financial Resource Strain: Low Risk  (2023)    Overall Financial Resource Strain (CARDIA)     Difficulty of Paying Living Expenses: Not hard at all   Food Insecurity: Not on file   Transportation Needs: No Transportation Needs (2023)    PRAPARE - Transportation     Lack of Transportation (Medical): No     Lack of Transportation (Non-Medical): No   Physical Activity: Not on file   Stress: Not on file   Social Connections: Not on file   Intimate Partner Violence: Not on file   Housing Stability: Not on file      Medications and Allergies:     Current Outpatient Medications   Medication Sig Dispense Refill    acyclovir (ZOVIRAX) 5 % ointment Apply 1 application. topically daily as needed As directed      albuterol (PROVENTIL HFA,VENTOLIN HFA) 90 mcg/act inhaler Inhale 2 puffs every 6 (six) hours as needed for wheezing or shortness of breath 18 g 0    aspirin (ECOTRIN LOW STRENGTH) 81 mg EC tablet Take 1 tablet (81 mg total) by mouth daily 90 tablet 0    azelastine (ASTELIN) 0.1 % nasal spray 1 spray into each nostril 2 (two) times a day Use in each nostril as directed 30 mL 3    Fluticasone-Salmeterol (Advair) 100-50 mcg/dose inhaler Inhale 1 puff 2 (two) times a day Rinse mouth after use. 60 blister 5    ipratropium (ATROVENT) 0.03 % nasal spray 2 sprays into each nostril 3 (three) times a day as needed for rhinitis 30 mL 3    latanoprost (XALATAN) 0.005 % ophthalmic solution Apply to eye      magnesium Oxide (MAG-OX) 400 mg TABS  "take 1 tablet by mouth twice a day 30 tablet 3    meclizine (ANTIVERT) 25 mg tablet Take 1 tablet (25 mg total) by mouth 3 (three) times a day as needed for dizziness 30 tablet 0    omeprazole (PriLOSEC) 20 mg delayed release capsule Take 1 capsule (20 mg total) by mouth daily before breakfast 90 capsule 3    omeprazole (PriLOSEC) 40 MG capsule Take 1 capsule (40 mg total) by mouth daily 90 capsule 3    Riboflavin 400 MG TABS TAKE ONE TABLET BY MOUTH TWICE A  tablet 0    sucralfate (CARAFATE) 1 g tablet take 1 tablet by mouth three times a day 90 tablet 0     No current facility-administered medications for this visit.     Allergies   Allergen Reactions    Ciprofloxacin     Clindamycin     Fluticasone-Salmeterol Other (See Comments)     Leg cramps    Keflex [Cephalexin]     Naproxen Hives and Other (See Comments)     \"hives & chest pain\"     Nsaids     Sulfa Antibiotics Hives, Itching and Swelling    Atorvastatin Myalgia      Immunizations:     Immunization History   Administered Date(s) Administered    INFLUENZA 09/14/2017    Tdap 04/06/2021      Health Maintenance:         Topic Date Due    Hepatitis C Screening  Never done    Lung Cancer Screening  12/09/2023    Breast Cancer Screening: Mammogram  12/15/2023    Colorectal Cancer Screening  12/17/2024         Topic Date Due    COVID-19 Vaccine (1) Never done    Pneumococcal Vaccine: 65+ Years (1 - PCV) Never done    Influenza Vaccine (1) 09/01/2023      Medicare Screening Tests and Risk Assessments:     Jolanta is here for her Subsequent Wellness visit. Last Medicare Wellness visit information reviewed, patient interviewed and updates made to the record today.      Health Risk Assessment:   Patient rates overall health as good. Patient feels that their physical health rating is same. Patient is satisfied with their life. Eyesight was rated as same. Hearing was rated as same. Patient feels that their emotional and mental health rating is same. Patients states " they are never, rarely angry. Patient states they are sometimes unusually tired/fatigued. Pain experienced in the last 7 days has been some. Patient's pain rating has been 5/10. Patient states that she has experienced no weight loss or gain in last 6 months.  Reports right rib pain to palpation.     Depression Screening:   PHQ-9 Score: 0      Fall Risk Screening:   In the past year, patient has experienced: no history of falling in past year      Urinary Incontinence Screening:   Patient has not leaked urine accidently in the last six months.     Home Safety:  Patient does not have trouble with stairs inside or outside of their home. Patient has working smoke alarms and has working carbon monoxide detector. Home safety hazards include: none.     Nutrition:   Current diet is Regular.     Medications:   Patient is currently taking over-the-counter supplements. OTC medications include: see medication list. Patient is able to manage medications.     Activities of Daily Living (ADLs)/Instrumental Activities of Daily Living (IADLs):   Walk and transfer into and out of bed and chair?: Yes  Dress and groom yourself?: Yes    Bathe or shower yourself?: Yes    Feed yourself? Yes  Do your laundry/housekeeping?: Yes  Manage your money, pay your bills and track your expenses?: Yes  Make your own meals?: Yes    Do your own shopping?: Yes    Previous Hospitalizations:   Any hospitalizations or ED visits within the last 12 months?: No      Advance Care Planning:   Living will: No    Durable POA for healthcare: No    Advanced directive: No      Cognitive Screening:   Provider or family/friend/caregiver concerned regarding cognition?: No    PREVENTIVE SCREENINGS      Cardiovascular Screening:    General: History Lipid Disorder and Screening Current      Diabetes Screening:     General: Screening Current      Colorectal Cancer Screening:     General: Screening Current      Breast Cancer Screening:     General: Screening Current       "Cervical Cancer Screening:    General: Screening Not Indicated    Screening, Brief Intervention, and Referral to Treatment (SBIRT)    Screening  Typical number of drinks in a day: 0  Typical number of drinks in a week: 0  Interpretation: Low risk drinking behavior.    Single Item Drug Screening:  How often have you used an illegal drug (including marijuana) or a prescription medication for non-medical reasons in the past year? never    Single Item Drug Screen Score: 0  Interpretation: Negative screen for possible drug use disorder    No results found.     Physical Exam:     /70   Pulse 61   Temp (!) 97.1 °F (36.2 °C)   Resp 18   Ht 5' 3\" (1.6 m)   Wt 78.4 kg (172 lb 12.8 oz)   SpO2 99%   BMI 30.61 kg/m²     Physical Exam  Vitals and nursing note reviewed.   Constitutional:       General: She is not in acute distress.     Appearance: She is well-developed. She is not ill-appearing or diaphoretic.   HENT:      Head: Normocephalic and atraumatic.      Right Ear: Hearing, tympanic membrane and ear canal normal.      Left Ear: Hearing, tympanic membrane and ear canal normal.      Nose: Nose normal.      Mouth/Throat:      Pharynx: Uvula midline.   Eyes:      General: Lids are normal.      Conjunctiva/sclera: Conjunctivae normal.   Cardiovascular:      Rate and Rhythm: Normal rate and regular rhythm.      Heart sounds: Normal heart sounds, S1 normal and S2 normal. No murmur heard.     No gallop.   Pulmonary:      Effort: Pulmonary effort is normal. No respiratory distress.      Breath sounds: Normal breath sounds.   Musculoskeletal:         General: No tenderness. Normal range of motion.   Lymphadenopathy:      Cervical: No cervical adenopathy.   Skin:     General: Skin is warm.      Capillary Refill: Capillary refill takes less than 2 seconds.      Findings: No rash.   Neurological:      Mental Status: She is alert.   Psychiatric:         Behavior: Behavior normal. Behavior is cooperative.         Thought " Content: Thought content normal.         Judgment: Judgment normal.          PAYAL Foreman

## 2023-12-19 NOTE — PROGRESS NOTES
HCC coding opportunities       Chart reviewed, no opportunity found: CHART REVIEWED, NO OPPORTUNITY FOUND     GR     Patients Insurance     Medicare Insurance: [facility] [employer]age

## 2023-12-22 ENCOUNTER — HOSPITAL ENCOUNTER (OUTPATIENT)
Dept: MAMMOGRAPHY | Facility: HOSPITAL | Age: 73
End: 2023-12-22
Payer: COMMERCIAL

## 2023-12-22 DIAGNOSIS — Z12.31 SCREENING MAMMOGRAM FOR BREAST CANCER: ICD-10-CM

## 2023-12-22 PROCEDURE — 77063 BREAST TOMOSYNTHESIS BI: CPT

## 2023-12-22 PROCEDURE — 77067 SCR MAMMO BI INCL CAD: CPT

## 2023-12-26 ENCOUNTER — CLINICAL SUPPORT (OUTPATIENT)
Dept: FAMILY MEDICINE CLINIC | Facility: CLINIC | Age: 73
End: 2023-12-26
Payer: COMMERCIAL

## 2023-12-26 DIAGNOSIS — N39.0 URINARY TRACT INFECTION WITHOUT HEMATURIA, SITE UNSPECIFIED: ICD-10-CM

## 2023-12-26 DIAGNOSIS — R30.0 BURNING WITH URINATION: Primary | ICD-10-CM

## 2023-12-26 LAB
BILIRUB UR QL STRIP: NEGATIVE
CLARITY UR: ABNORMAL
COLOR UR: ABNORMAL
GLUCOSE UR STRIP-MCNC: NEGATIVE MG/DL
HGB UR QL STRIP.AUTO: ABNORMAL
KETONES UR STRIP-MCNC: NEGATIVE MG/DL
LEUKOCYTE ESTERASE UR QL STRIP: ABNORMAL
NITRITE UR QL STRIP: NEGATIVE
PH UR STRIP.AUTO: 6 [PH]
PROT UR STRIP-MCNC: ABNORMAL MG/DL
SL AMB  POCT GLUCOSE, UA: NEGATIVE
SL AMB LEUKOCYTE ESTERASE,UA: ABNORMAL
SL AMB POCT BILIRUBIN,UA: NEGATIVE
SL AMB POCT BLOOD,UA: ABNORMAL
SL AMB POCT CLARITY,UA: ABNORMAL
SL AMB POCT COLOR,UA: ABNORMAL
SL AMB POCT KETONES,UA: NEGATIVE
SL AMB POCT NITRITE,UA: NEGATIVE
SL AMB POCT PH,UA: 6
SL AMB POCT SPECIFIC GRAVITY,UA: 1.02
SL AMB POCT URINE PROTEIN: NEGATIVE
SL AMB POCT UROBILINOGEN: 0.2
SP GR UR STRIP.AUTO: 1.02 (ref 1–1.03)
UROBILINOGEN UR STRIP-ACNC: <2 MG/DL

## 2023-12-26 PROCEDURE — 87086 URINE CULTURE/COLONY COUNT: CPT | Performed by: NURSE PRACTITIONER

## 2023-12-26 PROCEDURE — 87077 CULTURE AEROBIC IDENTIFY: CPT | Performed by: NURSE PRACTITIONER

## 2023-12-26 PROCEDURE — 81002 URINALYSIS NONAUTO W/O SCOPE: CPT

## 2023-12-26 PROCEDURE — 99211 OFF/OP EST MAY X REQ PHY/QHP: CPT

## 2023-12-26 PROCEDURE — 81001 URINALYSIS AUTO W/SCOPE: CPT

## 2023-12-26 PROCEDURE — 87186 SC STD MICRODIL/AGAR DIL: CPT | Performed by: NURSE PRACTITIONER

## 2023-12-26 RX ORDER — AMOXICILLIN AND CLAVULANATE POTASSIUM 875; 125 MG/1; MG/1
1 TABLET, FILM COATED ORAL EVERY 12 HOURS SCHEDULED
Qty: 14 TABLET | Refills: 0 | Status: SHIPPED | OUTPATIENT
Start: 2023-12-26 | End: 2024-01-02

## 2023-12-26 NOTE — TELEPHONE ENCOUNTER
Pt called with possible UTI. Has burning with urination. She is going to stop down this afternoon to provide urine sample.     Orders placed.

## 2023-12-26 NOTE — TELEPHONE ENCOUNTER
Please call her to find out which antibiotic she can take- she has numerous allergies to antibiotics

## 2023-12-26 NOTE — TELEPHONE ENCOUNTER
Patient was here today for [place] Urine test. POCT was done and results were entered.     Please advise.

## 2023-12-26 NOTE — TELEPHONE ENCOUNTER
Spoke with patient. She wants the antibiotic she was given last time. Cannot remember the name of it. Per chart review, she was given Augmentin for cystitis back in August.

## 2023-12-27 LAB
BACTERIA UR QL AUTO: ABNORMAL /HPF
NON-SQ EPI CELLS URNS QL MICRO: ABNORMAL /HPF
RBC #/AREA URNS AUTO: ABNORMAL /HPF
RENAL EPI CELLS #/AREA URNS HPF: PRESENT /[HPF]
TRANS CELLS #/AREA URNS HPF: PRESENT /[HPF]
WBC #/AREA URNS AUTO: ABNORMAL /HPF

## 2023-12-28 DIAGNOSIS — R14.2 BELCHING SYMPTOM: ICD-10-CM

## 2023-12-28 DIAGNOSIS — K21.9 GASTROESOPHAGEAL REFLUX DISEASE WITHOUT ESOPHAGITIS: ICD-10-CM

## 2023-12-28 LAB — BACTERIA UR CULT: ABNORMAL

## 2023-12-28 RX ORDER — SUCRALFATE 1 G/1
1 TABLET ORAL 3 TIMES DAILY
Qty: 90 TABLET | Refills: 0 | Status: SHIPPED | OUTPATIENT
Start: 2023-12-28

## 2023-12-30 ENCOUNTER — HOSPITAL ENCOUNTER (OUTPATIENT)
Dept: CT IMAGING | Facility: HOSPITAL | Age: 73
Discharge: HOME | End: 2023-12-30
Payer: COMMERCIAL

## 2023-12-30 DIAGNOSIS — R91.8 MULTIPLE NODULES OF LUNG: ICD-10-CM

## 2023-12-30 DIAGNOSIS — F17.210 SMOKING GREATER THAN 40 PACK YEARS: ICD-10-CM

## 2023-12-30 DIAGNOSIS — Z12.2 SCREENING FOR LUNG CANCER: ICD-10-CM

## 2023-12-30 PROCEDURE — 71271 CT THORAX LUNG CANCER SCR C-: CPT

## 2024-01-12 ENCOUNTER — TELEPHONE (OUTPATIENT)
Age: 74
End: 2024-01-12

## 2024-01-12 NOTE — TELEPHONE ENCOUNTER
Called and LMOM for patient. Renal US unremarkable, however patient needs cysto per Dr. Diggs's last note. Patient tentatively scheduled for 1/17/24 @ 2:30 pm in [place]. Asked her to call back to confirm.

## 2024-01-12 NOTE — TELEPHONE ENCOUNTER
Patient called to f/u on the cysto that Dr. Diggs discussed with her at her visit with him. She said she's not emptying completely and you're back is starting to hurt.    CB: [phone]

## 2024-01-12 NOTE — TELEPHONE ENCOUNTER
Per appointment notes patient returned call and confirmed appointment for cysto next week    Pt asking for Meloxicam.  No  history of being on this medication.

## 2024-01-17 ENCOUNTER — PROCEDURE VISIT (OUTPATIENT)
Dept: UROLOGY | Facility: CLINIC | Age: 74
End: 2024-01-17
Payer: COMMERCIAL

## 2024-01-17 VITALS
DIASTOLIC BLOOD PRESSURE: 84 MMHG | OXYGEN SATURATION: 96 % | HEART RATE: 75 BPM | BODY MASS INDEX: 30.48 KG/M2 | WEIGHT: 172 LBS | HEIGHT: 63 IN | SYSTOLIC BLOOD PRESSURE: 136 MMHG

## 2024-01-17 DIAGNOSIS — N39.3 STRESS INCONTINENCE: ICD-10-CM

## 2024-01-17 DIAGNOSIS — R31.0 GROSS HEMATURIA: Primary | ICD-10-CM

## 2024-01-17 DIAGNOSIS — R35.0 URINARY FREQUENCY: ICD-10-CM

## 2024-01-17 DIAGNOSIS — N39.0 RECURRENT UTI: ICD-10-CM

## 2024-01-17 LAB
SL AMB  POCT GLUCOSE, UA: NEGATIVE
SL AMB LEUKOCYTE ESTERASE,UA: NEGATIVE
SL AMB POCT BILIRUBIN,UA: NEGATIVE
SL AMB POCT BLOOD,UA: NEGATIVE
SL AMB POCT CLARITY,UA: CLEAR
SL AMB POCT COLOR,UA: NORMAL
SL AMB POCT KETONES,UA: NEGATIVE
SL AMB POCT NITRITE,UA: NEGATIVE
SL AMB POCT PH,UA: 6
SL AMB POCT SPECIFIC GRAVITY,UA: 1.01
SL AMB POCT URINE PROTEIN: NEGATIVE
SL AMB POCT UROBILINOGEN: 0.2

## 2024-01-17 PROCEDURE — 81002 URINALYSIS NONAUTO W/O SCOPE: CPT | Performed by: UROLOGY

## 2024-01-17 PROCEDURE — 52000 CYSTOURETHROSCOPY: CPT | Performed by: UROLOGY

## 2024-01-17 PROCEDURE — 99214 OFFICE O/P EST MOD 30 MIN: CPT | Performed by: UROLOGY

## 2024-01-17 RX ORDER — NITROFURANTOIN 25; 75 MG/1; MG/1
100 CAPSULE ORAL ONCE
Qty: 1 CAPSULE | Refills: 0 | Status: SHIPPED | OUTPATIENT
Start: 2024-01-17 | End: 2024-01-17

## 2024-01-17 NOTE — PROGRESS NOTES
UROLOGY PROGRESS NOTE   [facility]  [address] [place] [address] [address]  [address]  [place] [address]  [phone]  Fax:[phone]  [email or web address]      NAME: Jolanta Villatoro  AGE: 73 y.o. SEX: female  : 1950   MRN: [id number]    DATE: 2024  TIME: 3:29 PM    Assessment and Plan:    Negative workup for gross hematuria, feeling of incomplete bladder emptying, and it sounds like a combination of non volitional incontinence perhaps due to CVA 4 years ago and stress incontinence.  Discussed the management of the stress incontinence.  For the nonvolitional incontinence there is basically nothing we can do.  For the stress incontinence the treatments are Kegel exercises or surgical which would be urethral bulking agents or a sling.  I discouraged surgery because of the feeling of incomplete bladder emptying and I believe she has a higher risk of urinary retention.  We also discussed floor therapy and physical therapy.  She will think about this and I will make a referral upon her request.    Recurrent UTI: Unfortunately not a candidate for topical estrogen.  She also has 8 allergies to antibiotics.  Last UTI was pan susceptible E. coli 2023.  Symptoms of UTI are burning sensation from the bladder up through her chest.  These symptoms go away with antibiotics.  With her multiple allergies I do not wish to do prophylactic antibiotics.  Also she is having about 3-4 infections per year so I have decided to simply treat the symptoms as they arise.  Methenamine is an option if it becomes more frequent.               Chief Complaint     Chief Complaint   Patient presents with    Cystoscopy     Urine dip done today.       History of Present Illness   Recurrent UTI with gross hematuria and smoking history-also stress incontinence here for cystoscopy for this.  Also feeling of incomplete bladder emptying.  She has a history of breast cancer in her sister so I was very resistant to starting topical  estrogen.  Kidney bladder ultrasound ordered by me October 4, 2023 was normal.  Also has urinary frequency.  She complains of none volitional urinary incontinence, basically just comes out, along with some stress incontinence that occurs with lifting.       Cystoscopy     Date/Time  1/17/2024 2:30 PM     Performed by  Caleb Diggs MD   Authorized by  Caleb Diggs MD     Universal Protocol:  Consent: Verbal consent obtained. Written consent obtained.      Procedure Details:  Procedure type: cystoscopy    Additional Procedure Details: Cystoscopy Procedure Note        Pre-operative Diagnosis: History gross hematuria, recurrent UTI, non volitional incontinence, stress incontinence    Post-operative Diagnosis: Same    Procedure: Flexible cystoscopy    Surgeon: Caleb Diggs MD    Anesthesia: 1% Xylocaine per urethra    EBL: Minimal    Complications: none    Procedure Details   The risks, benefits, complications, treatment options, and expected outcomes were discussed with the patient. The patient concurred with the proposed plan, giving informed consent.    Cystoscopy was performed today under local anesthesia, using sterile technique. The patient was placed in the supine position, prepped with Betadine, and draped in the usual sterile fashion. The flexible cystocope was used to inspect both the urethra and bladder    Findings:  Urethra: Small urethral carbuncle.  Could not elicit stress incontinence with heavy cough.  There was some urethral hypermobility.    Bladder:  Smooth, not trabeculated and there were no stones tumors or other lesions.  The orifices were orthotopic and intact.  When the patient got up from the table and walked to the bathroom she had irrigation leaking down her leg.           Specimens: none                 Complications:  None           Disposition: To home            Condition:  Stable              The following portions of the patient's history were reviewed and updated as appropriate:  "allergies, current medications, past family history, past medical history, past social history, past surgical history and problem list.  Past Medical History:   Diagnosis Date    Cancer (HCC)     gallbladder    COVID-19     GERD (gastroesophageal reflux disease)     Glaucoma     History of gallbladder cancer     Approx. 3 years ago    Hyperlipemia     Lightheadedness 5/3/2021    Murmur     Rheumatic fever     RUQ abdominal pain 5/3/2021     Past Surgical History:   Procedure Laterality Date    CHOLECYSTECTOMY      TONSILLECTOMY  CHILDHOOD     shoulder  Review of Systems   Review of Systems   Genitourinary:         Per HPI       Active Problem List     Patient Active Problem List   Diagnosis    Depression, recurrent (HCC)    Seasonal allergies    Primary gall bladder adenocarcinoma (HCC)    Pure hypertriglyceridemia    Diverticulosis    Murmur    SOLORZANO (dyspnea on exertion)    COPD, group A, by GOLD 2017 classification ([facility])    History of CVA with residual deficit    GERD (gastroesophageal reflux disease)    Glaucoma    Headache       Objective   /84   Pulse 75   Ht 5' 3\" (1.6 m)   Wt 78 kg (172 lb)   SpO2 96%   BMI 30.47 kg/m²     Physical Exam  Vitals reviewed.   Constitutional:       Appearance: Normal appearance. She is normal weight.   HENT:      Head: Normocephalic and atraumatic.   Eyes:      Extraocular Movements: Extraocular movements intact.   Genitourinary:     General: Normal vulva.      Vagina: No vaginal discharge.   Musculoskeletal:         General: Normal range of motion.      Cervical back: Normal range of motion.   Skin:     Coloration: Skin is not jaundiced or pale.   Neurological:      General: No focal deficit present.      Mental Status: She is alert and oriented to person, place, and time. Mental status is at baseline.   Psychiatric:         Mood and Affect: Mood normal.         Behavior: Behavior normal.         Thought Content: Thought content normal.         Judgment: Judgment " normal.             Current Medications     Current Outpatient Medications:     acyclovir (ZOVIRAX) 5 % ointment, Apply 1 application. topically daily as needed As directed, Disp: , Rfl:     albuterol (PROVENTIL HFA,VENTOLIN HFA) 90 mcg/act inhaler, Inhale 2 puffs every 6 (six) hours as needed for wheezing or shortness of breath, Disp: 18 g, Rfl: 0    aspirin (ECOTRIN LOW STRENGTH) 81 mg EC tablet, Take 1 tablet (81 mg total) by mouth daily, Disp: 90 tablet, Rfl: 0    azelastine (ASTELIN) 0.1 % nasal spray, 1 spray into each nostril 2 (two) times a day Use in each nostril as directed, Disp: 30 mL, Rfl: 3    Fluticasone-Salmeterol (Advair) 100-50 mcg/dose inhaler, Inhale 1 puff 2 (two) times a day Rinse mouth after use., Disp: 60 blister, Rfl: 5    ipratropium (ATROVENT) 0.03 % nasal spray, 2 sprays into each nostril 3 (three) times a day as needed for rhinitis, Disp: 30 mL, Rfl: 3    latanoprost (XALATAN) 0.005 % ophthalmic solution, Apply to eye, Disp: , Rfl:     magnesium Oxide (MAG-OX) 400 mg TABS, take 1 tablet by mouth twice a day, Disp: 30 tablet, Rfl: 3    meclizine (ANTIVERT) 25 mg tablet, Take 1 tablet (25 mg total) by mouth 3 (three) times a day as needed for dizziness, Disp: 30 tablet, Rfl: 0    nitrofurantoin (MACROBID) 100 mg capsule, Take 1 capsule (100 mg total) by mouth 1 (one) time for 1 dose After procedure, Disp: 1 capsule, Rfl: 0    omeprazole (PriLOSEC) 20 mg delayed release capsule, Take 1 capsule (20 mg total) by mouth daily before breakfast, Disp: 90 capsule, Rfl: 3    omeprazole (PriLOSEC) 40 MG capsule, Take 1 capsule (40 mg total) by mouth daily, Disp: 90 capsule, Rfl: 3    Riboflavin 400 MG TABS, TAKE ONE TABLET BY MOUTH TWICE A DAY, Disp: 180 tablet, Rfl: 0    sucralfate (CARAFATE) 1 g tablet, take 1 tablet by mouth three times a day, Disp: 90 tablet, Rfl: 0        Caleb Diggs MD

## 2024-01-17 NOTE — LETTER
2024     PAYAL Foreman  [address]  [address]  [facility] [address]    Patient: Jolanta Villatoro   YOB: 1950   Date of Visit: 2024       Dear Dr. Diaz:    Thank you for referring Jolanta Villatoro to me for evaluation. Below are my notes for this consultation.    If you have questions, please do not hesitate to call me. I look forward to following your patient along with you.         Sincerely,        Caleb Diggs MD        CC: No Recipients    Caleb Diggs MD  2024  3:37 PM  Incomplete  UROLOGY PROGRESS NOTE   [facility]  [address] [place] [address]  [address]  [place] [address]  [phone]  Fax:[phone]  [email or web address]      NAME: Jolanta Villatoro  AGE: 73 y.o. SEX: female  : 1950   MRN: [id number]    DATE: 2024  TIME: 3:29 PM    Assessment and Plan:    Negative workup for gross hematuria, feeling of incomplete bladder emptying, and it sounds like a combination of non volitional incontinence perhaps due to CVA 4 years ago and stress incontinence.  Discussed the management of the stress incontinence.  For the nonvolitional incontinence there is basically nothing we can do.  For the stress incontinence the treatments are Kegel exercises or surgical which would be urethral bulking agents or a sling.  I discouraged surgery because of the feeling of incomplete bladder emptying and I believe she has a higher risk of urinary retention.  We also discussed floor therapy and physical therapy.  She will think about this and I will make a referral upon her request.    Recurrent UTI: Unfortunately not a candidate for topical estrogen.  She also has 8 allergies to antibiotics.  Last UTI was pan susceptible E. coli 2023.  Symptoms of UTI are burning sensation from the bladder up through her chest.  These symptoms go away with antibiotics.  With her multiple allergies I do not wish to do prophylactic antibiotics.  Also she is having about 3-4  infections per year so I have decided to simply treat the symptoms as they arise.  Methenamine is an option if it becomes more frequent.               Chief Complaint     Chief Complaint   Patient presents with   • Cystoscopy     Urine dip done today.       History of Present Illness   Recurrent UTI with gross hematuria and smoking history-also stress incontinence here for cystoscopy for this.  Also feeling of incomplete bladder emptying.  She has a history of breast cancer in her sister so I was very resistant to starting topical estrogen.  Kidney bladder ultrasound ordered by me October 4, 2023 was normal.  Also has urinary frequency.  She complains of none volitional urinary incontinence, basically just comes out, along with some stress incontinence that occurs with lifting.       Cystoscopy     Date/Time  1/17/2024 2:30 PM     Performed by  Caleb Diggs MD   Authorized by  Caleb Diggs MD     Universal Protocol:  Consent: Verbal consent obtained. Written consent obtained.      Procedure Details:  Procedure type: cystoscopy    Additional Procedure Details: Cystoscopy Procedure Note        Pre-operative Diagnosis: History gross hematuria, recurrent UTI, non volitional incontinence, stress incontinence    Post-operative Diagnosis: Same    Procedure: Flexible cystoscopy    Surgeon: Caleb Diggs MD    Anesthesia: 1% Xylocaine per urethra    EBL: Minimal    Complications: none    Procedure Details   The risks, benefits, complications, treatment options, and expected outcomes were discussed with the patient. The patient concurred with the proposed plan, giving informed consent.    Cystoscopy was performed today under local anesthesia, using sterile technique. The patient was placed in the supine position, prepped with Betadine, and draped in the usual sterile fashion. The flexible cystocope was used to inspect both the urethra and bladder    Findings:  Urethra: Small urethral carbuncle.  Could not elicit stress  "incontinence with heavy cough.  There was some urethral hypermobility.    Bladder:  Smooth, not trabeculated and there were no stones tumors or other lesions.  The orifices were orthotopic and intact.  When the patient got up from the table and walked to the bathroom she had irrigation leaking down her leg.           Specimens: none                 Complications:  None           Disposition: To home            Condition:  Stable              The following portions of the patient's history were reviewed and updated as appropriate: allergies, current medications, past family history, past medical history, past social history, past surgical history and problem list.  Past Medical History:   Diagnosis Date   • Cancer (HCC)     gallbladder   • COVID-19    • GERD (gastroesophageal reflux disease)    • Glaucoma    • History of gallbladder cancer     Approx. 3 years ago   • Hyperlipemia    • Lightheadedness 5/3/2021   • Murmur    • Rheumatic fever    • RUQ abdominal pain 5/3/2021     Past Surgical History:   Procedure Laterality Date   • CHOLECYSTECTOMY     • TONSILLECTOMY  CHILDHOOD     shoulder  Review of Systems   Review of Systems   Genitourinary:         Per HPI       Active Problem List     Patient Active Problem List   Diagnosis   • Depression, recurrent (HCC)   • Seasonal allergies   • Primary gall bladder adenocarcinoma (HCC)   • Pure hypertriglyceridemia   • Diverticulosis   • Murmur   • SOLORZANO (dyspnea on exertion)   • COPD, group A, by GOLD 2017 classification ([facility])   • History of CVA with residual deficit   • GERD (gastroesophageal reflux disease)   • Glaucoma   • Headache       Objective   /84   Pulse 75   Ht 5' 3\" (1.6 m)   Wt 78 kg (172 lb)   SpO2 96%   BMI 30.47 kg/m²     Physical Exam  Vitals reviewed.   Constitutional:       Appearance: Normal appearance. She is normal weight.   HENT:      Head: Normocephalic and atraumatic.   Eyes:      Extraocular Movements: Extraocular movements intact. "   Genitourinary:     General: Normal vulva.      Vagina: No vaginal discharge.   Musculoskeletal:         General: Normal range of motion.      Cervical back: Normal range of motion.   Skin:     Coloration: Skin is not jaundiced or pale.   Neurological:      General: No focal deficit present.      Mental Status: She is alert and oriented to person, place, and time. Mental status is at baseline.   Psychiatric:         Mood and Affect: Mood normal.         Behavior: Behavior normal.         Thought Content: Thought content normal.         Judgment: Judgment normal.             Current Medications     Current Outpatient Medications:   •  acyclovir (ZOVIRAX) 5 % ointment, Apply 1 application. topically daily as needed As directed, Disp: , Rfl:   •  albuterol (PROVENTIL HFA,VENTOLIN HFA) 90 mcg/act inhaler, Inhale 2 puffs every 6 (six) hours as needed for wheezing or shortness of breath, Disp: 18 g, Rfl: 0  •  aspirin (ECOTRIN LOW STRENGTH) 81 mg EC tablet, Take 1 tablet (81 mg total) by mouth daily, Disp: 90 tablet, Rfl: 0  •  azelastine (ASTELIN) 0.1 % nasal spray, 1 spray into each nostril 2 (two) times a day Use in each nostril as directed, Disp: 30 mL, Rfl: 3  •  Fluticasone-Salmeterol (Advair) 100-50 mcg/dose inhaler, Inhale 1 puff 2 (two) times a day Rinse mouth after use., Disp: 60 blister, Rfl: 5  •  ipratropium (ATROVENT) 0.03 % nasal spray, 2 sprays into each nostril 3 (three) times a day as needed for rhinitis, Disp: 30 mL, Rfl: 3  •  latanoprost (XALATAN) 0.005 % ophthalmic solution, Apply to eye, Disp: , Rfl:   •  magnesium Oxide (MAG-OX) 400 mg TABS, take 1 tablet by mouth twice a day, Disp: 30 tablet, Rfl: 3  •  meclizine (ANTIVERT) 25 mg tablet, Take 1 tablet (25 mg total) by mouth 3 (three) times a day as needed for dizziness, Disp: 30 tablet, Rfl: 0  •  nitrofurantoin (MACROBID) 100 mg capsule, Take 1 capsule (100 mg total) by mouth 1 (one) time for 1 dose After procedure, Disp: 1 capsule, Rfl: 0  •   omeprazole (PriLOSEC) 20 mg delayed release capsule, Take 1 capsule (20 mg total) by mouth daily before breakfast, Disp: 90 capsule, Rfl: 3  •  omeprazole (PriLOSEC) 40 MG capsule, Take 1 capsule (40 mg total) by mouth daily, Disp: 90 capsule, Rfl: 3  •  Riboflavin 400 MG TABS, TAKE ONE TABLET BY MOUTH TWICE A DAY, Disp: 180 tablet, Rfl: 0  •  sucralfate (CARAFATE) 1 g tablet, take 1 tablet by mouth three times a day, Disp: 90 tablet, Rfl: 0        MD Caleb Kurtz MD  2024  2:38 PM  Sign when Signing Visit  UROLOGY PROGRESS NOTE   [facility]  [address] [place] [address] [place]  [address]  [place] [address]  [phone]  Fax:[phone]  [email or web address]      NAME: Jolanta Villatoro  AGE: 73 y.o. SEX: female  : 1950   MRN: [id number]    DATE: 2024  TIME: 10:06 AM    Assessment and Plan               Chief Complaint   No chief complaint on file.      History of Present Illness   Recurrent UTI with gross hematuria and smoking history-also stress incontinence here for cystoscopy for this.  Also feeling of incomplete bladder emptying.  She has a history of breast cancer in her sister so I was very resistant to starting topical estrogen.  Kidney bladder ultrasound ordered by me 2023 was normal.  Also has urinary frequency.       Cystoscopy     Date/Time  2024 2:30 PM     Performed by  Caleb Diggs MD   Authorized by  Caleb Diggs MD     Universal Protocol:  Consent: Verbal consent obtained. Written consent obtained.      Procedure Details:  Procedure type: cystoscopy    Additional Procedure Details: Cystoscopy Procedure Note        Pre-operative Diagnosis: History gross hematuria, recurrent UTI    Post-operative Diagnosis: Same    Procedure: Flexible cystoscopy    Surgeon: Caleb Diggs MD    Anesthesia: 1% Xylocaine per urethra    EBL: Minimal    Complications: none    Procedure Details   The risks, benefits, complications, treatment options, and expected  outcomes were discussed with the patient. The patient concurred with the proposed plan, giving informed consent.    Cystoscopy was performed today under local anesthesia, using sterile technique. The patient was placed in the supine position, prepped with Betadine, and draped in the usual sterile fashion. The flexible cystocope was used to inspect both the urethra and bladder    Findings:  Urethra:    Bladder:  Smooth, not trabeculated and there were no stones tumors or other lesions.  The orifices were orthotopic and intact.           Specimens: none                 Complications:  None           Disposition: To home            Condition:  Stable              The following portions of the patient's history were reviewed and updated as appropriate: allergies, current medications, past family history, past medical history, past social history, past surgical history and problem list.  Past Medical History:   Diagnosis Date   • Cancer (HCC)     gallbladder   • COVID-19    • GERD (gastroesophageal reflux disease)    • Glaucoma    • History of gallbladder cancer     Approx. 3 years ago   • Hyperlipemia    • Lightheadedness 5/3/2021   • Murmur    • Rheumatic fever    • RUQ abdominal pain 5/3/2021     Past Surgical History:   Procedure Laterality Date   • CHOLECYSTECTOMY     • TONSILLECTOMY  CHILDHOOD     shoulder  Review of Systems   Review of Systems    Active Problem List     Patient Active Problem List   Diagnosis   • Depression, recurrent (HCC)   • Seasonal allergies   • Primary gall bladder adenocarcinoma (HCC)   • Pure hypertriglyceridemia   • Diverticulosis   • Murmur   • SOLORZANO (dyspnea on exertion)   • COPD, group A, by GOLD 2017 classification (HCC)   • History of CVA with residual deficit   • GERD (gastroesophageal reflux disease)   • Glaucoma   • Headache       Objective   There were no vitals taken for this visit.    Physical Exam        Current Medications     Current Outpatient Medications:   •  acyclovir  (ZOVIRAX) 5 % ointment, Apply 1 application. topically daily as needed As directed, Disp: , Rfl:   •  albuterol (PROVENTIL HFA,VENTOLIN HFA) 90 mcg/act inhaler, Inhale 2 puffs every 6 (six) hours as needed for wheezing or shortness of breath, Disp: 18 g, Rfl: 0  •  aspirin (ECOTRIN LOW STRENGTH) 81 mg EC tablet, Take 1 tablet (81 mg total) by mouth daily, Disp: 90 tablet, Rfl: 0  •  azelastine (ASTELIN) 0.1 % nasal spray, 1 spray into each nostril 2 (two) times a day Use in each nostril as directed, Disp: 30 mL, Rfl: 3  •  Fluticasone-Salmeterol (Advair) 100-50 mcg/dose inhaler, Inhale 1 puff 2 (two) times a day Rinse mouth after use., Disp: 60 blister, Rfl: 5  •  ipratropium (ATROVENT) 0.03 % nasal spray, 2 sprays into each nostril 3 (three) times a day as needed for rhinitis, Disp: 30 mL, Rfl: 3  •  latanoprost (XALATAN) 0.005 % ophthalmic solution, Apply to eye, Disp: , Rfl:   •  magnesium Oxide (MAG-OX) 400 mg TABS, take 1 tablet by mouth twice a day, Disp: 30 tablet, Rfl: 3  •  meclizine (ANTIVERT) 25 mg tablet, Take 1 tablet (25 mg total) by mouth 3 (three) times a day as needed for dizziness, Disp: 30 tablet, Rfl: 0  •  omeprazole (PriLOSEC) 20 mg delayed release capsule, Take 1 capsule (20 mg total) by mouth daily before breakfast, Disp: 90 capsule, Rfl: 3  •  omeprazole (PriLOSEC) 40 MG capsule, Take 1 capsule (40 mg total) by mouth daily, Disp: 90 capsule, Rfl: 3  •  Riboflavin 400 MG TABS, TAKE ONE TABLET BY MOUTH TWICE A DAY, Disp: 180 tablet, Rfl: 0  •  sucralfate (CARAFATE) 1 g tablet, take 1 tablet by mouth three times a day, Disp: 90 tablet, Rfl: 0        Caleb Diggs MD

## 2024-01-25 DIAGNOSIS — H81.10 BENIGN PAROXYSMAL POSITIONAL VERTIGO, UNSPECIFIED LATERALITY: ICD-10-CM

## 2024-01-25 RX ORDER — MECLIZINE HYDROCHLORIDE 25 MG/1
25 TABLET ORAL 3 TIMES DAILY PRN
Qty: 30 TABLET | Refills: 0 | Status: SHIPPED | OUTPATIENT
Start: 2024-01-25

## 2024-01-25 NOTE — TELEPHONE ENCOUNTER
Patient requesting refill(s) of: meclizine 25 mg TID PRN    Last filled: 5/5/2022 #30 x 0  Last appt: 12/19/2023  Next appt: 6/19/2024  Pharmacy: [facility] [place]

## 2024-01-26 DIAGNOSIS — K21.9 GASTROESOPHAGEAL REFLUX DISEASE WITHOUT ESOPHAGITIS: ICD-10-CM

## 2024-01-26 DIAGNOSIS — R14.2 BELCHING SYMPTOM: ICD-10-CM

## 2024-01-26 RX ORDER — SUCRALFATE 1 G/1
1 TABLET ORAL 3 TIMES DAILY
Qty: 90 TABLET | Refills: 0 | Status: SHIPPED | OUTPATIENT
Start: 2024-01-26

## 2024-01-26 NOTE — TELEPHONE ENCOUNTER
Patient requesting refill(s) of: carafate 1g TID    Last filled: 12/28/2023 #90 x 0  Last appt: 12/19/2023  Next appt: 6/19/2024  Pharmacy: [facility] [place]

## 2024-02-02 ENCOUNTER — OFFICE VISIT (OUTPATIENT)
Dept: FAMILY MEDICINE CLINIC | Facility: CLINIC | Age: 74
End: 2024-02-02
Payer: COMMERCIAL

## 2024-02-02 VITALS
SYSTOLIC BLOOD PRESSURE: 126 MMHG | HEART RATE: 53 BPM | TEMPERATURE: 96 F | OXYGEN SATURATION: 99 % | WEIGHT: 172 LBS | BODY MASS INDEX: 30.48 KG/M2 | DIASTOLIC BLOOD PRESSURE: 78 MMHG | HEIGHT: 63 IN

## 2024-02-02 DIAGNOSIS — H65.03 NON-RECURRENT ACUTE SEROUS OTITIS MEDIA OF BOTH EARS: ICD-10-CM

## 2024-02-02 DIAGNOSIS — J06.9 URI WITH COUGH AND CONGESTION: Primary | ICD-10-CM

## 2024-02-02 PROCEDURE — 99213 OFFICE O/P EST LOW 20 MIN: CPT | Performed by: NURSE PRACTITIONER

## 2024-02-02 RX ORDER — PREDNISONE 20 MG/1
20 TABLET ORAL 2 TIMES DAILY WITH MEALS
Qty: 10 TABLET | Refills: 0 | Status: SHIPPED | OUTPATIENT
Start: 2024-02-02 | End: 2024-02-07

## 2024-02-02 RX ORDER — AZITHROMYCIN 250 MG/1
TABLET, FILM COATED ORAL
Qty: 6 TABLET | Refills: 0 | Status: SHIPPED | OUTPATIENT
Start: 2024-02-02 | End: 2024-02-06

## 2024-02-02 RX ORDER — BENZONATATE 100 MG/1
100 CAPSULE ORAL 3 TIMES DAILY PRN
Qty: 20 CAPSULE | Refills: 0 | Status: SHIPPED | OUTPATIENT
Start: 2024-02-02

## 2024-02-02 NOTE — PATIENT INSTRUCTIONS
Take medication as prescribed.   Tessalon perles as needed for cough  Call the office with worsening symptoms.

## 2024-02-02 NOTE — PROGRESS NOTES
Name: Jolanta Villatoro      : 1950      MRN: [id number]  Encounter Provider: PAYAL Foreman  Encounter Date: 2024   Encounter department: [facility] PRIMARY CARE    Assessment & Plan     1. URI with cough and congestion  -     azithromycin (ZITHROMAX) 250 mg tablet; Take 2 tablets today then 1 tablet daily x 4 days  -     predniSONE 20 mg tablet; Take 1 tablet (20 mg total) by mouth 2 (two) times a day with meals for 5 days  -     benzonatate (TESSALON PERLES) 100 mg capsule; Take 1 capsule (100 mg total) by mouth 3 (three) times a day as needed for cough    2. Non-recurrent acute serous otitis media of both ears  -     azithromycin (ZITHROMAX) 250 mg tablet; Take 2 tablets today then 1 tablet daily x 4 days  -     predniSONE 20 mg tablet; Take 1 tablet (20 mg total) by mouth 2 (two) times a day with meals for 5 days  -     benzonatate (TESSALON PERLES) 100 mg capsule; Take 1 capsule (100 mg total) by mouth 3 (three) times a day as needed for cough           Subjective      Patient here with follow up dizziness, started again 1 week ago. Taking meclizine with some relief. Now started with ear fullness about 1 week ago as well.  Some mild winter congestion but does reports that she did have a sore throat for about 3-4 days with pain but now improved, sweats,      Review of Systems   Constitutional: Negative.  Negative for fatigue and fever.   HENT:  Positive for congestion, ear pain, postnasal drip, rhinorrhea and sore throat.    Respiratory: Negative.  Negative for shortness of breath and wheezing.    Cardiovascular: Negative.  Negative for chest pain and palpitations.   Gastrointestinal: Negative.    Skin: Negative.  Negative for rash.   Neurological:  Positive for dizziness. Negative for light-headedness and headaches.   Psychiatric/Behavioral: Negative.  Negative for decreased concentration and dysphoric mood. The patient is not nervous/anxious.        Current Outpatient Medications on  "File Prior to Visit   Medication Sig    acyclovir (ZOVIRAX) 5 % ointment Apply 1 application. topically daily as needed As directed    albuterol (PROVENTIL HFA,VENTOLIN HFA) 90 mcg/act inhaler Inhale 2 puffs every 6 (six) hours as needed for wheezing or shortness of breath    aspirin (ECOTRIN LOW STRENGTH) 81 mg EC tablet Take 1 tablet (81 mg total) by mouth daily    azelastine (ASTELIN) 0.1 % nasal spray 1 spray into each nostril 2 (two) times a day Use in each nostril as directed    Fluticasone-Salmeterol (Advair) 100-50 mcg/dose inhaler Inhale 1 puff 2 (two) times a day Rinse mouth after use.    ipratropium (ATROVENT) 0.03 % nasal spray 2 sprays into each nostril 3 (three) times a day as needed for rhinitis    latanoprost (XALATAN) 0.005 % ophthalmic solution Apply to eye    magnesium Oxide (MAG-OX) 400 mg TABS take 1 tablet by mouth twice a day    meclizine (ANTIVERT) 25 mg tablet Take 1 tablet (25 mg total) by mouth 3 (three) times a day as needed for dizziness    omeprazole (PriLOSEC) 20 mg delayed release capsule Take 1 capsule (20 mg total) by mouth daily before breakfast    omeprazole (PriLOSEC) 40 MG capsule Take 1 capsule (40 mg total) by mouth daily    Riboflavin 400 MG TABS TAKE ONE TABLET BY MOUTH TWICE A DAY    sucralfate (CARAFATE) 1 g tablet take 1 tablet by mouth three times a day       Objective     /78   Pulse (!) 53   Temp (!) 96 °F (35.6 °C) (Tympanic)   Ht 5' 3\" (1.6 m)   Wt 78 kg (172 lb)   SpO2 99%   BMI 30.47 kg/m²     Physical Exam  Vitals and nursing note reviewed.   Constitutional:       General: She is not in acute distress.     Appearance: Normal appearance. She is well-developed. She is not ill-appearing.   HENT:      Head: Normocephalic and atraumatic.      Right Ear: Tympanic membrane and ear canal normal.      Left Ear: Ear canal normal.      Nose: Congestion present.      Mouth/Throat:      Mouth: Mucous membranes are moist.      Pharynx: Posterior oropharyngeal " erythema present. No oropharyngeal exudate.   Eyes:      General: Lids are normal.      Conjunctiva/sclera: Conjunctivae normal.   Cardiovascular:      Rate and Rhythm: Normal rate and regular rhythm.      Heart sounds: Normal heart sounds. No murmur heard.  Pulmonary:      Effort: Pulmonary effort is normal. No respiratory distress.      Breath sounds: Normal breath sounds. No decreased breath sounds or wheezing.   Musculoskeletal:         General: No tenderness or deformity. Normal range of motion.   Skin:     General: Skin is warm.      Findings: No erythema or rash.   Neurological:      General: No focal deficit present.      Mental Status: She is alert and oriented to person, place, and time.   Psychiatric:         Mood and Affect: Mood normal.         Behavior: Behavior normal. Behavior is cooperative.         Thought Content: Thought content normal.       PAYAL Foreman

## 2024-02-23 DIAGNOSIS — R25.2 MUSCLE CRAMPS: ICD-10-CM

## 2024-02-23 RX ORDER — LANOLIN ALCOHOL/MO/W.PET/CERES
CREAM (GRAM) TOPICAL
Qty: 30 TABLET | Refills: 3 | Status: SHIPPED | OUTPATIENT
Start: 2024-02-23

## 2024-02-23 NOTE — TELEPHONE ENCOUNTER
Patient called hoping for an appointment for coughing, Sinus pressure, runny nose, green Mucus and sharp pain on right side waist ( could be from Coughing but not sure), Please advise and call patient at [phone]

## 2024-02-23 NOTE — TELEPHONE ENCOUNTER
No appointments available today. Please refer to urgent care and recommend OTC medication and saline and flonase nasal sprays.

## 2024-02-23 NOTE — TELEPHONE ENCOUNTER
Patient requesting refill(s) of: magnesium    Last filled: 12/14/23 #30 x3  Last appt: 2/2/24  Next appt: 6/19/24  [facility]

## 2024-02-23 NOTE — TELEPHONE ENCOUNTER
Left message informing patient of lack of appointments and recommendation of urgent care. Also advised to call back with any questions.

## 2024-02-29 ENCOUNTER — OFFICE VISIT (OUTPATIENT)
Dept: FAMILY MEDICINE CLINIC | Facility: CLINIC | Age: 74
End: 2024-02-29
Payer: COMMERCIAL

## 2024-02-29 VITALS
SYSTOLIC BLOOD PRESSURE: 112 MMHG | WEIGHT: 166.6 LBS | HEART RATE: 57 BPM | OXYGEN SATURATION: 98 % | DIASTOLIC BLOOD PRESSURE: 62 MMHG | TEMPERATURE: 98.3 F | BODY MASS INDEX: 29.52 KG/M2 | HEIGHT: 63 IN

## 2024-02-29 DIAGNOSIS — F33.9 DEPRESSION, RECURRENT (HCC): ICD-10-CM

## 2024-02-29 DIAGNOSIS — J44.9 COPD, GROUP A, BY GOLD 2017 CLASSIFICATION (HCC): ICD-10-CM

## 2024-02-29 DIAGNOSIS — M54.50 ACUTE RIGHT-SIDED LOW BACK PAIN WITHOUT SCIATICA: Primary | ICD-10-CM

## 2024-02-29 DIAGNOSIS — C23 PRIMARY GALL BLADDER ADENOCARCINOMA (HCC): ICD-10-CM

## 2024-02-29 PROCEDURE — 99214 OFFICE O/P EST MOD 30 MIN: CPT | Performed by: NURSE PRACTITIONER

## 2024-02-29 PROCEDURE — G2211 COMPLEX E/M VISIT ADD ON: HCPCS | Performed by: NURSE PRACTITIONER

## 2024-02-29 RX ORDER — CYCLOBENZAPRINE HCL 10 MG
10 TABLET ORAL
Qty: 30 TABLET | Refills: 0 | Status: SHIPPED | OUTPATIENT
Start: 2024-02-29

## 2024-02-29 RX ORDER — PREDNISONE 20 MG/1
TABLET ORAL DAILY
Qty: 12 TABLET | Refills: 0 | Status: SHIPPED | OUTPATIENT
Start: 2024-02-29 | End: 2024-03-08

## 2024-02-29 NOTE — PROGRESS NOTES
Name: Jolanta Villatoro      : 1950      MRN: [id number]  Encounter Provider: PAYAL Foreman  Encounter Date: 2024   Encounter department: [facility] PRIMARY CARE    Assessment & Plan     1. Acute right-sided low back pain without sciatica  -     cyclobenzaprine (FLEXERIL) 10 mg tablet; Take 1 tablet (10 mg total) by mouth daily at bedtime  -     predniSONE 20 mg tablet; Take 2 tablets (40 mg total) by mouth daily for 4 days, THEN 1 tablet (20 mg total) daily for 4 days.    2. Primary gall bladder adenocarcinoma (HCC)    3. COPD, group A, by GOLD 2017 classification (HCC)    4. Depression, recurrent (HCC)        Depression Screening and Follow-up Plan: Patient was screened for depression during today's encounter. They screened negative with a PHQ-9 score of 0.        Subjective       Patient here for acute visit with c/o back pain lower right side for the last few weeks. Taking tylenol with some relief.  No other home treatment.  Denies falls, shoveling snow, accidents/trauma. Using a heated blanket and heated car seat.  Denies sciatic symptoms.  Pain worse with movement and laying down at night.       Review of Systems   Constitutional: Negative.    Musculoskeletal:  Positive for back pain.   Skin: Negative.  Negative for rash.       Current Outpatient Medications on File Prior to Visit   Medication Sig    acyclovir (ZOVIRAX) 5 % ointment Apply 1 application. topically daily as needed As directed    albuterol (PROVENTIL HFA,VENTOLIN HFA) 90 mcg/act inhaler Inhale 2 puffs every 6 (six) hours as needed for wheezing or shortness of breath    aspirin (ECOTRIN LOW STRENGTH) 81 mg EC tablet Take 1 tablet (81 mg total) by mouth daily    azelastine (ASTELIN) 0.1 % nasal spray 1 spray into each nostril 2 (two) times a day Use in each nostril as directed    benzonatate (TESSALON PERLES) 100 mg capsule Take 1 capsule (100 mg total) by mouth 3 (three) times a day as needed for cough    ipratropium  "(ATROVENT) 0.03 % nasal spray 2 sprays into each nostril 3 (three) times a day as needed for rhinitis    latanoprost (XALATAN) 0.005 % ophthalmic solution Apply to eye    magnesium Oxide (MAG-OX) 400 mg TABS take 1 tablet by mouth twice a day    meclizine (ANTIVERT) 25 mg tablet Take 1 tablet (25 mg total) by mouth 3 (three) times a day as needed for dizziness    omeprazole (PriLOSEC) 20 mg delayed release capsule Take 1 capsule (20 mg total) by mouth daily before breakfast    omeprazole (PriLOSEC) 40 MG capsule Take 1 capsule (40 mg total) by mouth daily    Riboflavin 400 MG TABS TAKE ONE TABLET BY MOUTH TWICE A DAY    sucralfate (CARAFATE) 1 g tablet take 1 tablet by mouth three times a day    Fluticasone-Salmeterol (Advair) 100-50 mcg/dose inhaler Inhale 1 puff 2 (two) times a day Rinse mouth after use.       Objective     /62   Pulse 57   Temp 98.3 °F (36.8 °C)   Ht 5' 3\" (1.6 m)   Wt 75.6 kg (166 lb 9.6 oz)   SpO2 98%   BMI 29.51 kg/m²     Physical Exam  Vitals and nursing note reviewed.   Constitutional:       General: She is not in acute distress.     Appearance: Normal appearance. She is well-developed. She is not ill-appearing.   Eyes:      General: Lids are normal.   Cardiovascular:      Rate and Rhythm: Normal rate and regular rhythm.      Heart sounds: Normal heart sounds. No murmur heard.  Pulmonary:      Effort: Pulmonary effort is normal. No respiratory distress.      Breath sounds: Normal breath sounds. No decreased breath sounds or wheezing.   Musculoskeletal:         General: No deformity. Normal range of motion.      Lumbar back: Tenderness present.      Comments: Right lower back ttp, no ttp over spine.    Skin:     General: Skin is warm.      Findings: No erythema or rash.   Neurological:      General: No focal deficit present.      Mental Status: She is alert and oriented to person, place, and time.   Psychiatric:         Behavior: Behavior normal. Behavior is cooperative.         " Thought Content: Thought content normal.       PAYAL Foreman

## 2024-02-29 NOTE — PATIENT INSTRUCTIONS
Lower Back Exercises   AMBULATORY CARE:   Lower back exercises  help heal and strengthen your back muscles to prevent another injury. Ask your healthcare provider if you need to see a physical therapist for more advanced exercises.  Seek care immediately if:   You have severe pain that prevents you from moving.       Call your doctor if:   Your pain becomes worse.    You have new pain.    You have questions or concerns about your condition or care.    Do lower back exercises safely:   Do the exercises on a mat or firm surface (not on a bed).  A firm surface will support your spine and prevent low back pain.    Move slowly and smoothly.  Avoid fast or jerky motions.    Breathe normally.  Do not hold your breath.    Stop if you feel pain.  It is normal to feel some discomfort at first, but you should not feel pain. Regular exercise will help decrease your discomfort over time.    Lower back exercises:  Your healthcare provider may recommend that you do back exercises 10 to 30 minutes each day. He or she may also recommend that you do exercises 1 to 3 times each day. Ask your provider which exercises are best for you and how often to do them.  Ankle pumps:  Lie on your back. Move your foot up (with your toes pointing toward your head). Then, move your foot down (with your toes pointing away from you). Repeat this exercise 10 times on each side.         Heel slides:  Lie on your back. Slowly bend one leg and then straighten it. Next, bend the other leg and then straighten it. Repeat 10 times on each side.         Pelvic tilt:  Lie on your back with your knees bent and feet flat on the floor. Place your arms in a relaxed position beside your body. Tighten the muscles of your abdomen and flatten your back against the floor. Hold for 5 seconds. Repeat 5 times.         Back stretch:  Lie on your back with your hands behind your head. Bend your knees and turn the lower half of your body to one side. Hold this position for 10  seconds. Repeat 3 times on each side.         Straight leg raises:  Lie on your back with one leg straight. Bend the other knee. Tighten your abdomen and then slowly lift the straight leg up about 6 to 12 inches off the floor. Hold for 1 to 5 seconds. Lower your leg slowly. Repeat 10 times on each leg.         Knee-to-chest:  Lie on your back with your knees bent and feet flat on the floor. Pull one of your knees toward your chest and hold it there for 5 seconds. Return your leg to the starting position. Lift the other knee toward your chest and hold for 5 seconds. Do this 5 times on each side.         Cat and camel:  Place your hands and knees on the floor. Arch your back upward toward the ceiling and lower your head. Round out your spine as much as you can. Hold for 5 seconds. Lift your head upward and push your chest downward toward the floor. Hold for 5 seconds. Do 3 sets or as directed.         Wall squats:  Stand with your back against a wall. Tighten the muscles of your abdomen. Slowly lower your body until your knees are bent at a 45 degree angle. Hold this position for 5 seconds. Slowly move back up to a standing position. Repeat 10 times.         Curl up:  Lie on your back with your knees bent and feet flat on the floor. Place your hands, palms down, underneath the curve in your lower back. Next, with your elbows on the floor, lift your shoulders and chest 2 to 3 inches. Keep your head in line with your shoulders. Hold this position for 5 seconds. When you can do this exercise without pain for 10 to 15 seconds, you may add a rotation. While your shoulders and chest are lifted off the ground, turn slightly to the left and hold. Repeat on the other side.         Bird dog:  Place your hands and knees on the floor. Keep your wrists directly below your shoulders and your knees directly below your hips. Pull your belly button in toward your spine. Do not flatten or arch your back. Tighten your abdominal muscles.  Raise one arm straight out so that it is aligned with your head. Next, raise the leg opposite your arm. Hold this position for 15 seconds. Lower your arm and leg slowly and change sides. Do 5 sets.       Follow up with your doctor as directed:  Write down your questions so you remember to ask them during your visits.  © Copyright [employer] 2023 Information is for End User's use only and may not be sold, redistributed or otherwise used for commercial purposes.  The above information is an  only. It is not intended as medical advice for individual conditions or treatments. Talk to your doctor, nurse or pharmacist before following any medical regimen to see if it is safe and effective for you.

## 2024-03-10 ENCOUNTER — OFFICE VISIT (OUTPATIENT)
Dept: URGENT CARE | Facility: CLINIC | Age: 74
End: 2024-03-10
Payer: COMMERCIAL

## 2024-03-10 ENCOUNTER — APPOINTMENT (OUTPATIENT)
Dept: RADIOLOGY | Facility: CLINIC | Age: 74
End: 2024-03-10
Payer: COMMERCIAL

## 2024-03-10 VITALS
HEART RATE: 72 BPM | TEMPERATURE: 98.5 F | RESPIRATION RATE: 18 BRPM | SYSTOLIC BLOOD PRESSURE: 140 MMHG | WEIGHT: 166 LBS | OXYGEN SATURATION: 95 % | DIASTOLIC BLOOD PRESSURE: 65 MMHG | BODY MASS INDEX: 29.41 KG/M2 | HEIGHT: 63 IN

## 2024-03-10 DIAGNOSIS — R05.1 ACUTE COUGH: ICD-10-CM

## 2024-03-10 DIAGNOSIS — J20.9 ACUTE BRONCHITIS, UNSPECIFIED ORGANISM: Primary | ICD-10-CM

## 2024-03-10 PROCEDURE — S9088 SERVICES PROVIDED IN URGENT: HCPCS | Performed by: NURSE PRACTITIONER

## 2024-03-10 PROCEDURE — 99213 OFFICE O/P EST LOW 20 MIN: CPT | Performed by: NURSE PRACTITIONER

## 2024-03-10 PROCEDURE — 71046 X-RAY EXAM CHEST 2 VIEWS: CPT

## 2024-03-10 RX ORDER — BENZONATATE 100 MG/1
100 CAPSULE ORAL 3 TIMES DAILY PRN
Qty: 20 CAPSULE | Refills: 0 | Status: SHIPPED | OUTPATIENT
Start: 2024-03-10

## 2024-03-10 RX ORDER — AZITHROMYCIN 250 MG/1
TABLET, FILM COATED ORAL
Qty: 6 TABLET | Refills: 0 | Status: SHIPPED | OUTPATIENT
Start: 2024-03-10 | End: 2024-03-14

## 2024-03-10 NOTE — PROGRESS NOTES
[facility]        NAME: Jolanta Villatoro is a 73 y.o. female  : 1950    MRN: [id number]  DATE: March 10, 2024  TIME: 2:39 PM    Assessment and Plan   Acute bronchitis, unspecified organism [J20.9]  1. Acute bronchitis, unspecified organism  azithromycin (ZITHROMAX) 250 mg tablet    benzonatate (TESSALON PERLES) 100 mg capsule      2. Acute cough  XR chest pa & lateral            Patient Instructions       Follow up with PCP in 3-5 days.  Proceed to  ER if symptoms worsen.    If tests have been performed at [facility], our office will contact you with results if changes need to be made to the care plan discussed with you at the visit.  [facility]    Chief Complaint     Chief Complaint   Patient presents with    Cold Like Symptoms     Patient c/o cough, chest congestion, and SOB that started 2 days ago.           History of Present Illness       Cough  This is a new problem. The current episode started in the past 7 days (4 days ago). The problem has been gradually worsening. The problem occurs every few minutes. The cough is Productive of sputum. Associated symptoms include nasal congestion and rhinorrhea. Pertinent negatives include no chest pain, chills, ear pain, fever, headaches, myalgias, postnasal drip, rash, sore throat, shortness of breath, sweats or wheezing. Nothing aggravates the symptoms. She has tried nothing for the symptoms. The treatment provided no relief. Her past medical history is significant for COPD. There is no history of asthma.       Review of Systems   Review of Systems   Constitutional:  Negative for chills, diaphoresis, fatigue and fever.   HENT:  Positive for congestion and rhinorrhea. Negative for ear pain, facial swelling, postnasal drip, sinus pressure, sinus pain, sore throat, tinnitus and trouble swallowing.    Eyes: Negative.    Respiratory:  Positive for cough. Negative for chest tightness, shortness of breath, wheezing and  stridor.    Cardiovascular:  Negative for chest pain and palpitations.   Gastrointestinal: Negative.    Musculoskeletal:  Negative for arthralgias, back pain, joint swelling, myalgias, neck pain and neck stiffness.   Skin:  Negative for rash.   Neurological:  Negative for dizziness, facial asymmetry, weakness, light-headedness, numbness and headaches.         Current Medications       Current Outpatient Medications:     aspirin (ECOTRIN LOW STRENGTH) 81 mg EC tablet, Take 1 tablet (81 mg total) by mouth daily, Disp: 90 tablet, Rfl: 0    azithromycin (ZITHROMAX) 250 mg tablet, Take 2 tablets today then 1 tablet daily x 4 days, Disp: 6 tablet, Rfl: 0    benzonatate (TESSALON PERLES) 100 mg capsule, Take 1 capsule (100 mg total) by mouth 3 (three) times a day as needed for cough, Disp: 20 capsule, Rfl: 0    ipratropium (ATROVENT) 0.03 % nasal spray, 2 sprays into each nostril 3 (three) times a day as needed for rhinitis, Disp: 30 mL, Rfl: 3    latanoprost (XALATAN) 0.005 % ophthalmic solution, Apply to eye, Disp: , Rfl:     magnesium Oxide (MAG-OX) 400 mg TABS, take 1 tablet by mouth twice a day, Disp: 30 tablet, Rfl: 3    Riboflavin 400 MG TABS, TAKE ONE TABLET BY MOUTH TWICE A DAY, Disp: 180 tablet, Rfl: 0    sucralfate (CARAFATE) 1 g tablet, take 1 tablet by mouth three times a day, Disp: 90 tablet, Rfl: 0    acyclovir (ZOVIRAX) 5 % ointment, Apply 1 application. topically daily as needed As directed (Patient not taking: Reported on 3/10/2024), Disp: , Rfl:     albuterol (PROVENTIL HFA,VENTOLIN HFA) 90 mcg/act inhaler, Inhale 2 puffs every 6 (six) hours as needed for wheezing or shortness of breath (Patient not taking: Reported on 3/10/2024), Disp: 18 g, Rfl: 0    azelastine (ASTELIN) 0.1 % nasal spray, 1 spray into each nostril 2 (two) times a day Use in each nostril as directed (Patient not taking: Reported on 3/10/2024), Disp: 30 mL, Rfl: 3    cyclobenzaprine (FLEXERIL) 10 mg tablet, Take 1 tablet (10 mg total)  by mouth daily at bedtime (Patient not taking: Reported on 3/10/2024), Disp: 30 tablet, Rfl: 0    Fluticasone-Salmeterol (Advair) 100-50 mcg/dose inhaler, Inhale 1 puff 2 (two) times a day Rinse mouth after use. (Patient not taking: Reported on 3/10/2024), Disp: 60 blister, Rfl: 5    meclizine (ANTIVERT) 25 mg tablet, Take 1 tablet (25 mg total) by mouth 3 (three) times a day as needed for dizziness (Patient not taking: Reported on 3/10/2024), Disp: 30 tablet, Rfl: 0    omeprazole (PriLOSEC) 20 mg delayed release capsule, Take 1 capsule (20 mg total) by mouth daily before breakfast (Patient not taking: Reported on 3/10/2024), Disp: 90 capsule, Rfl: 3    omeprazole (PriLOSEC) 40 MG capsule, Take 1 capsule (40 mg total) by mouth daily (Patient not taking: Reported on 3/10/2024), Disp: 90 capsule, Rfl: 3    Current Allergies     Allergies as of 03/10/2024 - Reviewed 03/10/2024   Allergen Reaction Noted    Ciprofloxacin  02/03/2020    Clindamycin  02/03/2020    Fluticasone-salmeterol Other (See Comments) 10/13/2020    Keflex [cephalexin]  02/03/2020    Naproxen Hives and Other (See Comments) 08/03/2017    Nsaids  10/04/2020    Sulfa antibiotics Hives, Itching, and Swelling 08/03/2017    Atorvastatin Myalgia 10/26/2022            The following portions of the patient's history were reviewed and updated as appropriate: allergies, current medications, past family history, past medical history, past social history, past surgical history and problem list.     Past Medical History:   Diagnosis Date    Cancer (HCC)     gallbladder    COVID-19     GERD (gastroesophageal reflux disease)     Glaucoma     History of gallbladder cancer     Approx. 3 years ago    Hyperlipemia     Lightheadedness 5/3/2021    Murmur     Rheumatic fever     RUQ abdominal pain 5/3/2021       Past Surgical History:   Procedure Laterality Date    CHOLECYSTECTOMY      TONSILLECTOMY  CHILDHOOD       Family History   Problem Relation Age of Onset    Heart  "attack Mother     Kidney disease Father     Breast cancer Sister 66    No Known Problems Daughter     No Known Problems Maternal Grandmother     No Known Problems Paternal Grandmother     No Known Problems Sister     No Known Problems Maternal Aunt     No Known Problems Maternal Aunt     No Known Problems Maternal Aunt     No Known Problems Paternal Aunt     No Known Problems Paternal Aunt          Medications have been verified.        Objective   /65   Pulse 72   Temp 98.5 °F (36.9 °C) (Temporal)   Resp 18   Ht 5' 3\" (1.6 m)   Wt 75.3 kg (166 lb)   SpO2 95%   BMI 29.41 kg/m²   No LMP recorded. Patient is postmenopausal.       Physical Exam     Physical Exam  Constitutional:       General: She is not in acute distress.     Appearance: She is well-developed. She is not diaphoretic.   HENT:      Head: Normocephalic and atraumatic.      Right Ear: Hearing, tympanic membrane, ear canal and external ear normal.      Left Ear: Hearing, tympanic membrane, ear canal and external ear normal.      Nose: Rhinorrhea present. No mucosal edema or congestion.      Right Sinus: No maxillary sinus tenderness or frontal sinus tenderness.      Left Sinus: No maxillary sinus tenderness or frontal sinus tenderness.      Mouth/Throat:      Pharynx: Oropharynx is clear. Uvula midline.   Cardiovascular:      Rate and Rhythm: Normal rate and regular rhythm.      Heart sounds: Normal heart sounds, S1 normal and S2 normal.   Pulmonary:      Effort: Pulmonary effort is normal.      Breath sounds: Normal air entry. Examination of the right-lower field reveals rales. Rales present.   Lymphadenopathy:      Cervical: No cervical adenopathy.   Skin:     General: Skin is warm and dry.      Capillary Refill: Capillary refill takes less than 2 seconds.   Neurological:      Mental Status: She is alert and oriented to person, place, and time.                   "

## 2024-03-12 DIAGNOSIS — J44.9 COPD, GROUP A, BY GOLD 2017 CLASSIFICATION (HCC): ICD-10-CM

## 2024-03-12 RX ORDER — TIOTROPIUM BROMIDE INHALATION SPRAY 3.12 UG/1
2 SPRAY, METERED RESPIRATORY (INHALATION) DAILY
Qty: 4 G | Refills: 2 | Status: SHIPPED | OUTPATIENT
Start: 2024-03-12

## 2024-03-12 RX ORDER — ALBUTEROL SULFATE 90 UG/1
2 AEROSOL, METERED RESPIRATORY (INHALATION) EVERY 6 HOURS PRN
Qty: 18 G | Refills: 0 | Status: SHIPPED | OUTPATIENT
Start: 2024-03-12

## 2024-03-12 NOTE — TELEPHONE ENCOUNTER
Patient requesting refill(s) of: albuterol inhaler     Last filled: 11/28/2022 #18g x 0  Last appt: 2/29/2024  Next appt: 6/19/2024  Pharmacy: [facility]    Pt also asking for a Spirivia refill. I do not see this on her active med list. Ok to refill?

## 2024-03-12 NOTE — TELEPHONE ENCOUNTER
Yes, please refill spiriva for patient. Forrest she know what dose she was on? I dont see that I ever prescribed this for her.

## 2024-03-14 ENCOUNTER — TELEPHONE (OUTPATIENT)
Dept: FAMILY MEDICINE CLINIC | Facility: CLINIC | Age: 74
End: 2024-03-14

## 2024-03-14 DIAGNOSIS — J06.9 UPPER RESPIRATORY TRACT INFECTION, UNSPECIFIED TYPE: Primary | ICD-10-CM

## 2024-03-14 RX ORDER — DEXTROMETHORPHAN HYDROBROMIDE AND PROMETHAZINE HYDROCHLORIDE 15; 6.25 MG/5ML; MG/5ML
5 SYRUP ORAL 4 TIMES DAILY PRN
Qty: 240 ML | Refills: 0 | Status: SHIPPED | OUTPATIENT
Start: 2024-03-14

## 2024-03-14 NOTE — TELEPHONE ENCOUNTER
I sent in a cough medication for her today.  The cough with bronchitis can take weeks to get better.

## 2024-03-14 NOTE — TELEPHONE ENCOUNTER
Jolanta stated she was at Urgent care and she is no better then she saw you.  Urgent care, they did a chest xray. The cough whitney are not helping at all and she took her last antibiotic today.  She stated that her chest hurts and coughing up mucus    Is there a cough syrup or another round of medications    Please advise  She stated she was up all night coughing    Please advise    Phone: [phone]

## 2024-04-09 DIAGNOSIS — I63.9 STROKE (HCC): ICD-10-CM

## 2024-04-09 NOTE — TELEPHONE ENCOUNTER
Patient requesting refill(s) of:  Riboflavin     Last filled: 11/03/2023  Last appt:  2/29/2024  Next appt: 6/19/2024  Pharmacy:    [facility]

## 2024-04-10 RX ORDER — RIBOFLAVIN (VITAMIN B2) 400 MG
1 TABLET ORAL 2 TIMES DAILY
Qty: 180 TABLET | Refills: 0 | Status: SHIPPED | OUTPATIENT
Start: 2024-04-10

## 2024-04-16 DIAGNOSIS — R25.2 MUSCLE CRAMPS: ICD-10-CM

## 2024-04-16 RX ORDER — LANOLIN ALCOHOL/MO/W.PET/CERES
CREAM (GRAM) TOPICAL
Qty: 30 TABLET | Refills: 3 | Status: SHIPPED | OUTPATIENT
Start: 2024-04-16

## 2024-04-16 NOTE — TELEPHONE ENCOUNTER
Patient requesting refill(s) of: Magnesium     Last filled:  2/23/2024  Last appt:2/29/2024  Next appt:6/19/2024  Pharmacy:    [facility] [place]

## 2024-04-24 ENCOUNTER — OFFICE VISIT (OUTPATIENT)
Dept: FAMILY MEDICINE CLINIC | Facility: CLINIC | Age: 74
End: 2024-04-24
Payer: COMMERCIAL

## 2024-04-24 VITALS
RESPIRATION RATE: 14 BRPM | SYSTOLIC BLOOD PRESSURE: 122 MMHG | HEIGHT: 63 IN | HEART RATE: 62 BPM | OXYGEN SATURATION: 98 % | WEIGHT: 168.2 LBS | DIASTOLIC BLOOD PRESSURE: 76 MMHG | TEMPERATURE: 98.2 F | BODY MASS INDEX: 29.8 KG/M2

## 2024-04-24 DIAGNOSIS — J01.00 ACUTE NON-RECURRENT MAXILLARY SINUSITIS: Primary | ICD-10-CM

## 2024-04-24 DIAGNOSIS — N39.46 MIXED STRESS AND URGE URINARY INCONTINENCE: ICD-10-CM

## 2024-04-24 DIAGNOSIS — J44.1 COPD EXACERBATION (HCC): ICD-10-CM

## 2024-04-24 PROCEDURE — 81002 URINALYSIS NONAUTO W/O SCOPE: CPT | Performed by: INTERNAL MEDICINE

## 2024-04-24 PROCEDURE — 87086 URINE CULTURE/COLONY COUNT: CPT | Performed by: INTERNAL MEDICINE

## 2024-04-24 PROCEDURE — G2211 COMPLEX E/M VISIT ADD ON: HCPCS | Performed by: INTERNAL MEDICINE

## 2024-04-24 PROCEDURE — 99213 OFFICE O/P EST LOW 20 MIN: CPT | Performed by: INTERNAL MEDICINE

## 2024-04-24 RX ORDER — AMOXICILLIN AND CLAVULANATE POTASSIUM 875; 125 MG/1; MG/1
1 TABLET, FILM COATED ORAL EVERY 12 HOURS SCHEDULED
Qty: 14 TABLET | Refills: 0 | Status: SHIPPED | OUTPATIENT
Start: 2024-04-24 | End: 2024-05-01

## 2024-04-24 RX ORDER — PREDNISONE 20 MG/1
40 TABLET ORAL DAILY
Qty: 10 TABLET | Refills: 0 | Status: SHIPPED | OUTPATIENT
Start: 2024-04-24 | End: 2024-04-29

## 2024-04-24 NOTE — PROGRESS NOTES
[facility]        NAME: Jolanta Villatoro is a 74 y.o. female  : 1950    MRN: [id number]  DATE: 2024  TIME: 2:28 PM    Assessment and Plan   1. Acute non-recurrent maxillary sinusitis  -     amoxicillin-clavulanate (AUGMENTIN) 875-125 mg per tablet; Take 1 tablet by mouth every 12 (twelve) hours for 7 days    2. Mixed stress and urge urinary incontinence  -     POCT urine dip  -     Urine culture; Future  -     Urine culture    3. COPD exacerbation (HCC)  -     amoxicillin-clavulanate (AUGMENTIN) 875-125 mg per tablet; Take 1 tablet by mouth every 12 (twelve) hours for 7 days  -     predniSONE 20 mg tablet; Take 2 tablets (40 mg total) by mouth daily for 5 days             Chief Complaint     Chief Complaint   Patient presents with   • Nasal Congestion     Ear pain, scratchy throat x1 week   • Urinary Tract Infection     Frequency, back pain         History of Present Illness       75yo female with history of COPD here for acute same-day visit. Reports symptoms of nasal congestion, sore throat, fever/chills for past week. Also having chest tightness and SOB. No nausea/vomiting, diarrhea. No known sick contacts. Had bronchitis in March and treated with Z-pack. Used multiple OTC medications, including nasal sprays, allergy medications         Review of Systems   Review of Systems   Constitutional:  Positive for appetite change, chills, fatigue and fever.   HENT:  Positive for congestion, postnasal drip, rhinorrhea, sinus pressure and sore throat.    Respiratory:  Positive for cough and shortness of breath.    Gastrointestinal:  Negative for abdominal pain, diarrhea, nausea and vomiting.         Current Medications       Current Outpatient Medications:   •  acyclovir (ZOVIRAX) 5 % ointment, Apply 1 application. topically daily as needed As directed, Disp: , Rfl:   •  albuterol (PROVENTIL HFA,VENTOLIN HFA) 90 mcg/act inhaler, Inhale 2 puffs every 6 (six) hours as needed for wheezing  or shortness of breath, Disp: 18 g, Rfl: 0  •  amoxicillin-clavulanate (AUGMENTIN) 875-125 mg per tablet, Take 1 tablet by mouth every 12 (twelve) hours for 7 days, Disp: 14 tablet, Rfl: 0  •  aspirin (ECOTRIN LOW STRENGTH) 81 mg EC tablet, Take 1 tablet (81 mg total) by mouth daily, Disp: 90 tablet, Rfl: 0  •  azelastine (ASTELIN) 0.1 % nasal spray, 1 spray into each nostril 2 (two) times a day Use in each nostril as directed, Disp: 30 mL, Rfl: 3  •  benzonatate (TESSALON PERLES) 100 mg capsule, Take 1 capsule (100 mg total) by mouth 3 (three) times a day as needed for cough, Disp: 20 capsule, Rfl: 0  •  cyclobenzaprine (FLEXERIL) 10 mg tablet, Take 1 tablet (10 mg total) by mouth daily at bedtime, Disp: 30 tablet, Rfl: 0  •  Fluticasone-Salmeterol (Advair) 100-50 mcg/dose inhaler, Inhale 1 puff 2 (two) times a day Rinse mouth after use., Disp: 60 blister, Rfl: 5  •  ipratropium (ATROVENT) 0.03 % nasal spray, 2 sprays into each nostril 3 (three) times a day as needed for rhinitis, Disp: 30 mL, Rfl: 3  •  latanoprost (XALATAN) 0.005 % ophthalmic solution, Apply to eye, Disp: , Rfl:   •  magnesium Oxide (MAG-OX) 400 mg TABS, take 1 tablet by mouth twice a day, Disp: 30 tablet, Rfl: 3  •  meclizine (ANTIVERT) 25 mg tablet, Take 1 tablet (25 mg total) by mouth 3 (three) times a day as needed for dizziness, Disp: 30 tablet, Rfl: 0  •  predniSONE 20 mg tablet, Take 2 tablets (40 mg total) by mouth daily for 5 days, Disp: 10 tablet, Rfl: 0  •  promethazine-dextromethorphan (PHENERGAN-DM) 6.25-15 mg/5 mL oral syrup, Take 5 mL by mouth 4 (four) times a day as needed for cough, Disp: 240 mL, Rfl: 0  •  Riboflavin 400 MG TABS, Take 1 Tablet by mouth in the morning and 1 Tablet before bedtime., Disp: 180 tablet, Rfl: 0  •  Spiriva Respimat 2.5 MCG/ACT AERS inhaler, Inhale 2 puffs daily, Disp: 4 g, Rfl: 2  •  sucralfate (CARAFATE) 1 g tablet, take 1 tablet by mouth three times a day, Disp: 90 tablet, Rfl: 0  •  omeprazole  (PriLOSEC) 20 mg delayed release capsule, Take 1 capsule (20 mg total) by mouth daily before breakfast (Patient not taking: Reported on 3/10/2024), Disp: 90 capsule, Rfl: 3  •  omeprazole (PriLOSEC) 40 MG capsule, Take 1 capsule (40 mg total) by mouth daily (Patient not taking: Reported on 3/10/2024), Disp: 90 capsule, Rfl: 3    Current Allergies     Allergies as of 04/24/2024 - Reviewed 04/24/2024   Allergen Reaction Noted   • Ciprofloxacin  02/03/2020   • Clindamycin  02/03/2020   • Fluticasone-salmeterol Other (See Comments) 10/13/2020   • Keflex [cephalexin]  02/03/2020   • Naproxen Hives and Other (See Comments) 08/03/2017   • Nsaids  10/04/2020   • Sulfa antibiotics Hives, Itching, and Swelling 08/03/2017   • Atorvastatin Myalgia 10/26/2022            The following portions of the patient's history were reviewed and updated as appropriate: allergies, current medications, past family history, past medical history, past social history, past surgical history and problem list.     Past Medical History:   Diagnosis Date   • Cancer (HCC)     gallbladder   • COVID-19    • GERD (gastroesophageal reflux disease)    • Glaucoma    • History of gallbladder cancer     Approx. 3 years ago   • Hyperlipemia    • Lightheadedness 5/3/2021   • Murmur    • Rheumatic fever    • RUQ abdominal pain 5/3/2021       Past Surgical History:   Procedure Laterality Date   • CHOLECYSTECTOMY     • TONSILLECTOMY  CHILDHOOD       Family History   Problem Relation Age of Onset   • Heart attack Mother    • Kidney disease Father    • Breast cancer Sister 66   • No Known Problems Daughter    • No Known Problems Maternal Grandmother    • No Known Problems Paternal Grandmother    • No Known Problems Sister    • No Known Problems Maternal Aunt    • No Known Problems Maternal Aunt    • No Known Problems Maternal Aunt    • No Known Problems Paternal Aunt    • No Known Problems Paternal Aunt          Medications have been verified.        Objective   BP  "122/76   Pulse 62   Temp 98.2 °F (36.8 °C)   Resp 14   Ht 5' 3\" (1.6 m)   Wt 76.3 kg (168 lb 3.2 oz)   SpO2 98%   BMI 29.80 kg/m²        Physical Exam     Physical Exam  Vitals reviewed.   Constitutional:       General: She is not in acute distress.     Appearance: She is normal weight.   HENT:      Head: Normocephalic and atraumatic.      Right Ear: Tympanic membrane, ear canal and external ear normal.      Left Ear: Tympanic membrane, ear canal and external ear normal.      Nose: Rhinorrhea present. Rhinorrhea is clear.      Right Turbinates: Swollen and pale.      Left Turbinates: Swollen and pale.      Mouth/Throat:      Pharynx: No oropharyngeal exudate or posterior oropharyngeal erythema.   Cardiovascular:      Rate and Rhythm: Normal rate and regular rhythm.      Heart sounds: No murmur heard.     No friction rub. No gallop.   Pulmonary:      Effort: Pulmonary effort is normal.      Breath sounds: No wheezing or rhonchi.   Lymphadenopathy:      Cervical: No cervical adenopathy.   Neurological:      General: No focal deficit present.      Mental Status: She is alert.   Psychiatric:         Mood and Affect: Mood and affect normal.         Behavior: Behavior normal. Behavior is cooperative.             Results:  Lab Results   Component Value Date    SODIUM 141 12/18/2023    K 4.1 12/18/2023     12/18/2023    CO2 27 12/18/2023    BUN 19 12/18/2023    CREATININE 0.97 12/18/2023    GLUC 110 (H) 12/18/2023    CALCIUM 9.3 12/18/2023       Lab Results   Component Value Date    HGBA1C 5.9 (H) 12/18/2023       Lab Results   Component Value Date    WBC 6.53 06/20/2023    HGB 13.7 06/20/2023    HCT 42.2 06/20/2023    MCV 97 06/20/2023     06/20/2023         "

## 2024-04-25 LAB — BACTERIA UR CULT: NORMAL

## 2024-06-07 ENCOUNTER — RA CDI HCC (OUTPATIENT)
Dept: OTHER | Facility: HOSPITAL | Age: 74
End: 2024-06-07

## 2024-06-12 ENCOUNTER — RA CDI HCC (OUTPATIENT)
Dept: OTHER | Facility: HOSPITAL | Age: 74
End: 2024-06-12

## 2024-06-19 ENCOUNTER — OFFICE VISIT (OUTPATIENT)
Dept: FAMILY MEDICINE CLINIC | Facility: CLINIC | Age: 74
End: 2024-06-19
Payer: COMMERCIAL

## 2024-06-19 VITALS
HEIGHT: 63 IN | BODY MASS INDEX: 29.95 KG/M2 | HEART RATE: 74 BPM | RESPIRATION RATE: 18 BRPM | SYSTOLIC BLOOD PRESSURE: 118 MMHG | DIASTOLIC BLOOD PRESSURE: 62 MMHG | TEMPERATURE: 97.6 F | OXYGEN SATURATION: 96 % | WEIGHT: 169 LBS

## 2024-06-19 DIAGNOSIS — F33.9 DEPRESSION, RECURRENT (HCC): ICD-10-CM

## 2024-06-19 DIAGNOSIS — R10.31 RIGHT LOWER QUADRANT ABDOMINAL PAIN: ICD-10-CM

## 2024-06-19 DIAGNOSIS — J44.9 COPD, GROUP A, BY GOLD 2017 CLASSIFICATION (HCC): ICD-10-CM

## 2024-06-19 DIAGNOSIS — E78.1 PURE HYPERTRIGLYCERIDEMIA: Primary | ICD-10-CM

## 2024-06-19 LAB
ALBUMIN SERPL-MCNC: 4.1 G/DL (ref 3.5–5.7)
ALP SERPL-CCNC: 86 U/L (ref 35–120)
ALT SERPL-CCNC: 14 U/L
ANION GAP SERPL CALCULATED.3IONS-SCNC: 11 MMOL/L (ref 3–11)
AST SERPL-CCNC: 18 U/L
BASOPHILS # BLD AUTO: 0 THOU/CMM (ref 0–0.1)
BASOPHILS NFR BLD AUTO: 1 %
BILIRUB SERPL-MCNC: 0.6 MG/DL (ref 0.2–1)
BUN SERPL-MCNC: 18 MG/DL (ref 7–25)
CALCIUM SERPL-MCNC: 9 MG/DL (ref 8.5–10.1)
CHLORIDE SERPL-SCNC: 104 MMOL/L (ref 100–109)
CHOLEST SERPL-MCNC: 190 MG/DL
CHOLEST/HDLC SERPL: 4.2 {RATIO}
CO2 SERPL-SCNC: 26 MMOL/L (ref 21–31)
CREAT SERPL-MCNC: 0.69 MG/DL (ref 0.4–1.1)
CYTOLOGY CMNT CVX/VAG CYTO-IMP: NORMAL
DIFFERENTIAL METHOD BLD: NORMAL
EOSINOPHIL # BLD AUTO: 0.3 THOU/CMM (ref 0–0.5)
EOSINOPHIL NFR BLD AUTO: 5 %
ERYTHROCYTE [DISTWIDTH] IN BLOOD BY AUTOMATED COUNT: 13.8 % (ref 12–16)
EST. AVERAGE GLUCOSE BLD GHB EST-MCNC: 120 MG/DL
GFR/BSA.PRED SERPLBLD CYS-BASED-ARV: 91 ML/MIN/{1.73_M2}
GLUCOSE SERPL-MCNC: 98 MG/DL (ref 65–99)
HBA1C MFR BLD: 5.8 %
HCT VFR BLD AUTO: 42.7 % (ref 35–43)
HDLC SERPL-MCNC: 45 MG/DL (ref 23–92)
HGB BLD-MCNC: 14.5 G/DL (ref 11.5–14.5)
LDLC SERPL CALC-MCNC: 125 MG/DL
LYMPHOCYTES # BLD AUTO: 1.4 THOU/CMM (ref 1–3)
LYMPHOCYTES NFR BLD AUTO: 22 %
MCH RBC QN AUTO: 32.3 PG (ref 26–34)
MCHC RBC AUTO-ENTMCNC: 33.9 G/DL (ref 32–37)
MCV RBC AUTO: 95 FL (ref 80–100)
MONOCYTES # BLD AUTO: 0.5 THOU/CMM (ref 0.3–1)
MONOCYTES NFR BLD AUTO: 8 %
NEUTROPHILS # BLD AUTO: 4 THOU/CMM (ref 1.8–7.8)
NEUTROPHILS NFR BLD AUTO: 64 %
NONHDLC SERPL-MCNC: 145 MG/DL
PLATELET # BLD AUTO: 250 THOU/CMM (ref 140–350)
PMV BLD REES-ECKER: 7.8 FL (ref 7.5–11.3)
POTASSIUM SERPL-SCNC: 4.4 MMOL/L (ref 3.5–5.2)
PROT SERPL-MCNC: 6.8 G/DL (ref 6.3–8.3)
RBC # BLD AUTO: 4.48 MILL/CMM (ref 3.7–4.7)
SODIUM SERPL-SCNC: 141 MMOL/L (ref 135–145)
TRIGL SERPL-MCNC: 99 MG/DL
WBC # BLD AUTO: 6.3 THOU/CMM (ref 4–10)

## 2024-06-19 PROCEDURE — 99214 OFFICE O/P EST MOD 30 MIN: CPT | Performed by: NURSE PRACTITIONER

## 2024-06-19 PROCEDURE — G2211 COMPLEX E/M VISIT ADD ON: HCPCS | Performed by: NURSE PRACTITIONER

## 2024-06-19 NOTE — PROGRESS NOTES
"Ambulatory Visit  Name: Jolanta Villatoro      : 1950      MRN: [id number]  Encounter Provider: PAYAL Foreman  Encounter Date: 2024   Encounter department: [facility] PRIMARY CARE    Assessment & Plan   1. Pure hypertriglyceridemia  2. Depression, recurrent (HCC)  3. Right lower quadrant abdominal pain  4. COPD, group A, by GOLD 2017 classification (Formerly [facility])      Depression Screening and Follow-up Plan: Patient was screened for depression during today's encounter. They screened negative with a PHQ-9 score of 0.        History of Present Illness     Patient here for routine follow up visit.      COPD- breathing has been good,  not using albuterol everyday or spiriva.     Prediabetes- HgbA1c 6 months ago was 5.9.     Has been having some RLQ abdominal pain for \" awhile\" \" month or 2\"  pain comes and goes and will get sharp shooting pains.  Normal bowel movements.  Nothing makes it better or worse. Pain doesn't last long when she has it.       Review of Systems   Constitutional: Negative.  Negative for fatigue.   Respiratory: Negative.  Negative for shortness of breath and wheezing.    Cardiovascular: Negative.  Negative for chest pain and palpitations.   Gastrointestinal:  Positive for abdominal pain. Negative for constipation, diarrhea, nausea and vomiting.   Neurological: Negative.  Negative for dizziness, light-headedness and headaches.   Psychiatric/Behavioral: Negative.  Negative for dysphoric mood. The patient is not nervous/anxious.      Past Medical History:   Diagnosis Date    Cancer (HCC)     gallbladder    COVID-19     GERD (gastroesophageal reflux disease)     Glaucoma     History of gallbladder cancer     Approx. 3 years ago    Hyperlipemia     Lightheadedness 5/3/2021    Murmur     Rheumatic fever     RUQ abdominal pain 5/3/2021     Past Surgical History:   Procedure Laterality Date    CHOLECYSTECTOMY      TONSILLECTOMY  CHILDHOOD     Family History   Problem Relation Age of " Onset    Heart attack Mother     Kidney disease Father     Breast cancer Sister 66    No Known Problems Daughter     No Known Problems Maternal Grandmother     No Known Problems Paternal Grandmother     No Known Problems Sister     No Known Problems Maternal Aunt     No Known Problems Maternal Aunt     No Known Problems Maternal Aunt     No Known Problems Paternal Aunt     No Known Problems Paternal Aunt      Social History     Tobacco Use    Smoking status: Former     Current packs/day: 0.00     Average packs/day: 1 pack/day for 40.0 years (40.0 ttl pk-yrs)     Types: Cigarettes     Start date: 3/10/1972     Quit date: 3/10/2012     Years since quittin.2    Smokeless tobacco: Never   Vaping Use    Vaping status: Never Used   Substance and Sexual Activity    Alcohol use: Never    Drug use: Never    Sexual activity: Not Currently     Current Outpatient Medications on File Prior to Visit   Medication Sig    acyclovir (ZOVIRAX) 5 % ointment Apply 1 application. topically daily as needed As directed    albuterol (PROVENTIL HFA,VENTOLIN HFA) 90 mcg/act inhaler Inhale 2 puffs every 6 (six) hours as needed for wheezing or shortness of breath    aspirin (ECOTRIN LOW STRENGTH) 81 mg EC tablet Take 1 tablet (81 mg total) by mouth daily    azelastine (ASTELIN) 0.1 % nasal spray 1 spray into each nostril 2 (two) times a day Use in each nostril as directed    cyclobenzaprine (FLEXERIL) 10 mg tablet Take 1 tablet (10 mg total) by mouth daily at bedtime    Fluticasone-Salmeterol (Advair) 100-50 mcg/dose inhaler Inhale 1 puff 2 (two) times a day Rinse mouth after use.    ipratropium (ATROVENT) 0.03 % nasal spray 2 sprays into each nostril 3 (three) times a day as needed for rhinitis    latanoprost (XALATAN) 0.005 % ophthalmic solution Apply to eye    magnesium Oxide (MAG-OX) 400 mg TABS take 1 tablet by mouth twice a day    meclizine (ANTIVERT) 25 mg tablet Take 1 tablet (25 mg total) by mouth 3 (three) times a day as needed  "for dizziness    Riboflavin 400 MG TABS Take 1 Tablet by mouth in the morning and 1 Tablet before bedtime.    Spiriva Respimat 2.5 MCG/ACT AERS inhaler Inhale 2 puffs daily    sucralfate (CARAFATE) 1 g tablet take 1 tablet by mouth three times a day    [DISCONTINUED] benzonatate (TESSALON PERLES) 100 mg capsule Take 1 capsule (100 mg total) by mouth 3 (three) times a day as needed for cough    [DISCONTINUED] omeprazole (PriLOSEC) 20 mg delayed release capsule Take 1 capsule (20 mg total) by mouth daily before breakfast (Patient not taking: Reported on 3/10/2024)    [DISCONTINUED] omeprazole (PriLOSEC) 40 MG capsule Take 1 capsule (40 mg total) by mouth daily (Patient not taking: Reported on 3/10/2024)    [DISCONTINUED] promethazine-dextromethorphan (PHENERGAN-DM) 6.25-15 mg/5 mL oral syrup Take 5 mL by mouth 4 (four) times a day as needed for cough (Patient not taking: Reported on 6/19/2024)     Allergies   Allergen Reactions    Ciprofloxacin     Clindamycin     Fluticasone-Salmeterol Other (See Comments)     Leg cramps    Keflex [Cephalexin]     Naproxen Hives and Other (See Comments)     \"hives & chest pain\"     Nsaids     Sulfa Antibiotics Hives, Itching and Swelling    Atorvastatin Myalgia     Immunization History   Administered Date(s) Administered    INFLUENZA 09/14/2017    Tdap 04/06/2021     Objective     /62   Pulse 74   Temp 97.6 °F (36.4 °C)   Resp 18   Ht 5' 3\" (1.6 m)   Wt 76.7 kg (169 lb)   SpO2 96%   BMI 29.94 kg/m²     Physical Exam  Vitals and nursing note reviewed.   Constitutional:       General: She is not in acute distress.     Appearance: Normal appearance. She is well-developed. She is not diaphoretic.   HENT:      Head: Normocephalic and atraumatic.   Cardiovascular:      Rate and Rhythm: Normal rate and regular rhythm.   Pulmonary:      Effort: Pulmonary effort is normal. No tachypnea or respiratory distress.      Breath sounds: Normal breath sounds.   Skin:     General: Skin is " warm.      Findings: No rash.   Neurological:      General: No focal deficit present.      Mental Status: She is alert and oriented to person, place, and time.   Psychiatric:         Mood and Affect: Mood and affect normal.         Speech: Speech normal.         Behavior: Behavior normal. Behavior is cooperative.         Thought Content: Thought content normal.         Cognition and Memory: Cognition and memory normal.         Judgment: Judgment normal.       Administrative Statements

## 2024-06-27 ENCOUNTER — TELEPHONE (OUTPATIENT)
Dept: FAMILY MEDICINE CLINIC | Facility: CLINIC | Age: 74
End: 2024-06-27

## 2024-06-27 NOTE — TELEPHONE ENCOUNTER
----- Message from PAYAL Tatum sent at 6/27/2024 10:28 AM EDT -----  HgbA1c improved to 5.8. Labs are normal. Will recheck before next visit. Please enter Cmp. Lipid and hgba1c to be done before next visit.

## 2024-06-28 DIAGNOSIS — R25.2 MUSCLE CRAMPS: ICD-10-CM

## 2024-06-28 RX ORDER — LANOLIN ALCOHOL/MO/W.PET/CERES
400 CREAM (GRAM) TOPICAL 2 TIMES DAILY
Qty: 30 TABLET | Refills: 5 | Status: SHIPPED | OUTPATIENT
Start: 2024-06-28

## 2024-06-28 NOTE — TELEPHONE ENCOUNTER
Medication: magnesium Oxide (MAG-OX) 400 mg TABS     Dose/Frequency: take 1 tablet by mouth twice a day     Quantity: 30 tablet     Pharmacy: [id number] - [place] - [address] [place] [address]     Office:   [x] PCP/Provider -   [] Speciality/Provider -     Does the patient have enough for 3 days?   [x] Yes   [] No - Send as HP to POD

## 2024-07-05 ENCOUNTER — OFFICE VISIT (OUTPATIENT)
Dept: FAMILY MEDICINE CLINIC | Facility: CLINIC | Age: 74
End: 2024-07-05
Payer: COMMERCIAL

## 2024-07-05 VITALS
TEMPERATURE: 97.2 F | HEART RATE: 70 BPM | WEIGHT: 170 LBS | OXYGEN SATURATION: 96 % | HEIGHT: 63 IN | SYSTOLIC BLOOD PRESSURE: 110 MMHG | DIASTOLIC BLOOD PRESSURE: 64 MMHG | RESPIRATION RATE: 16 BRPM | BODY MASS INDEX: 30.12 KG/M2

## 2024-07-05 DIAGNOSIS — L03.031 CELLULITIS OF TOE OF RIGHT FOOT: ICD-10-CM

## 2024-07-05 DIAGNOSIS — L23.7 POISON IVY DERMATITIS: Primary | ICD-10-CM

## 2024-07-05 PROCEDURE — 99214 OFFICE O/P EST MOD 30 MIN: CPT | Performed by: FAMILY MEDICINE

## 2024-07-05 PROCEDURE — G2211 COMPLEX E/M VISIT ADD ON: HCPCS | Performed by: FAMILY MEDICINE

## 2024-07-05 RX ORDER — PREDNISONE 10 MG/1
TABLET ORAL
Qty: 21 TABLET | Refills: 0 | Status: SHIPPED | OUTPATIENT
Start: 2024-07-05 | End: 2024-07-15

## 2024-07-05 RX ORDER — CEPHALEXIN 500 MG/1
500 CAPSULE ORAL EVERY 8 HOURS
COMMUNITY
Start: 2024-06-27 | End: 2024-07-05

## 2024-07-05 RX ORDER — TRIAMCINOLONE ACETONIDE 1 MG/G
CREAM TOPICAL 2 TIMES DAILY
Qty: 28 G | Refills: 0 | Status: SHIPPED | OUTPATIENT
Start: 2024-07-05

## 2024-07-05 RX ORDER — DOXYCYCLINE HYCLATE 100 MG/1
100 CAPSULE ORAL EVERY 12 HOURS SCHEDULED
Qty: 14 CAPSULE | Refills: 0 | Status: SHIPPED | OUTPATIENT
Start: 2024-07-05 | End: 2024-07-12

## 2024-07-05 NOTE — PROGRESS NOTES
"Assessment/Plan:       Problem List Items Addressed This Visit    None  Visit Diagnoses       Poison ivy dermatitis    -  Primary    Relevant Medications    predniSONE 10 mg tablet    triamcinolone (KENALOG) 0.1 % cream    Cellulitis of toe of right foot        Relevant Medications    doxycycline hyclate (VIBRAMYCIN) 100 mg capsule              Subjective:      Patient ID: Jolanta Villatoro is a 74 y.o. female.    HPI    Was doing yard work and thinks she got into poison ivy, started with rash about 4 days ago. Itchy, arms and some on ankles.     Also infected ingrown toenail right 1st toe, was seen by podiatry and placed on Keflex, no improvement. Finished Keflex course. No fevers or chills.     The following portions of the patient's history were reviewed and updated as appropriate: allergies, current medications, past family history, past medical history, past social history, past surgical history, and problem list.    Review of Systems   All other systems reviewed and are negative.        Objective:      /64   Pulse 70   Temp (!) 97.2 °F (36.2 °C) (Temporal)   Resp 16   Ht 5' 3\" (1.6 m)   Wt 77.1 kg (170 lb)   SpO2 96%   BMI 30.11 kg/m²          Physical Exam  Vitals reviewed.   Constitutional:       General: She is not in acute distress.     Appearance: Normal appearance. She is not ill-appearing, toxic-appearing or diaphoretic.   Pulmonary:      Effort: Pulmonary effort is normal. No respiratory distress.   Skin:     Findings: Erythema and rash present.      Comments: Right 1st toe erythema, purulent discharge surrounding nail   Rash forearms, ankles    Neurological:      Mental Status: She is alert and oriented to person, place, and time.      Motor: No weakness.   Psychiatric:         Mood and Affect: Mood normal.         Behavior: Behavior normal.             Sonali Lane, DO  [facility]     "

## 2024-07-07 DIAGNOSIS — I63.9 STROKE (HCC): ICD-10-CM

## 2024-07-07 RX ORDER — RIBOFLAVIN (VITAMIN B2) 400 MG
1 TABLET ORAL 2 TIMES DAILY
Qty: 180 TABLET | Refills: 1 | Status: SHIPPED | OUTPATIENT
Start: 2024-07-07

## 2024-07-30 DIAGNOSIS — K21.9 GASTROESOPHAGEAL REFLUX DISEASE WITHOUT ESOPHAGITIS: Primary | ICD-10-CM

## 2024-07-30 DIAGNOSIS — R25.2 MUSCLE CRAMPS: ICD-10-CM

## 2024-07-30 NOTE — TELEPHONE ENCOUNTER
Not a Duplicate pt would like a 30 day supply and it to be sent to a different pharmacy    Reason for call:   [x] Refill   [] Prior Auth  [] Other:     Office:   [x] PCP/Provider -   [] Specialty/Provider -     Medication: magnesium Oxide (MAG-OX) 400 mg TABS Take 1 tablet (400 mg total) by mouth 2 (two) times a day       Pharmacy: [id number] - [place] [address]      Does the patient have enough for 3 days?   [x] Yes   [] No - Send as HP to POD

## 2024-07-30 NOTE — TELEPHONE ENCOUNTER
Pt called refill line stating she has been on an antibiotic for her toe for a long time and now has burning in the mouth and would like to know if that is from the antibiotic? Please call pt.       Also  pt needs medication that is not on active med list sent to pharmacy.    Reason for call:   [x] Refill   [] Prior Auth  [] Other:     Office:   [x] PCP/Provider -   [] Specialty/Provider -     Medication:   omeprazole (PriLOSEC) 40 MG capsule Take 1 capsule (40 mg total) by mouth daily       Pharmacy: [facility] - [address] [place] [address]      Does the patient have enough for 3 days?   [x] Yes   [] No - Send as HP to POD

## 2024-07-30 NOTE — TELEPHONE ENCOUNTER
Please enter script for patient. NO, antibiotics should not cause her to have mouth pain. Does she have white spots in her mouth? If it continues she might need to be seen for an appointment.

## 2024-07-31 RX ORDER — LANOLIN ALCOHOL/MO/W.PET/CERES
400 CREAM (GRAM) TOPICAL 2 TIMES DAILY
Qty: 60 TABLET | Refills: 5 | Status: SHIPPED | OUTPATIENT
Start: 2024-07-31

## 2024-07-31 RX ORDER — OMEPRAZOLE 40 MG/1
40 CAPSULE, DELAYED RELEASE ORAL DAILY
Qty: 30 CAPSULE | Refills: 5 | Status: SHIPPED | OUTPATIENT
Start: 2024-07-31

## 2024-07-31 NOTE — TELEPHONE ENCOUNTER
Pt states she also waiting on magnesium refill.     Informed her that the antibiotics should not cause mouth pain, questioned if she had white spots and she denied any white spots and instructed her to come in for a visit if it persists.

## 2024-08-07 ENCOUNTER — CLINICAL SUPPORT (OUTPATIENT)
Dept: FAMILY MEDICINE CLINIC | Facility: CLINIC | Age: 74
End: 2024-08-07
Payer: COMMERCIAL

## 2024-08-07 ENCOUNTER — TELEPHONE (OUTPATIENT)
Dept: FAMILY MEDICINE CLINIC | Facility: CLINIC | Age: 74
End: 2024-08-07

## 2024-08-07 DIAGNOSIS — R30.0 BURNING WITH URINATION: ICD-10-CM

## 2024-08-07 DIAGNOSIS — R10.2 PELVIC PRESSURE IN FEMALE: ICD-10-CM

## 2024-08-07 DIAGNOSIS — N39.0 URINARY TRACT INFECTION WITHOUT HEMATURIA, SITE UNSPECIFIED: Primary | ICD-10-CM

## 2024-08-07 DIAGNOSIS — R30.0 BURNING WITH URINATION: Primary | ICD-10-CM

## 2024-08-07 LAB
SL AMB  POCT GLUCOSE, UA: NEGATIVE
SL AMB LEUKOCYTE ESTERASE,UA: ABNORMAL
SL AMB POCT BILIRUBIN,UA: NEGATIVE
SL AMB POCT BLOOD,UA: ABNORMAL
SL AMB POCT CLARITY,UA: ABNORMAL
SL AMB POCT COLOR,UA: YELLOW
SL AMB POCT KETONES,UA: NEGATIVE
SL AMB POCT NITRITE,UA: NEGATIVE
SL AMB POCT PH,UA: 6
SL AMB POCT SPECIFIC GRAVITY,UA: 1.03
SL AMB POCT URINE PROTEIN: 300
SL AMB POCT UROBILINOGEN: 0.2

## 2024-08-07 PROCEDURE — 81002 URINALYSIS NONAUTO W/O SCOPE: CPT

## 2024-08-07 PROCEDURE — 87186 SC STD MICRODIL/AGAR DIL: CPT | Performed by: NURSE PRACTITIONER

## 2024-08-07 PROCEDURE — 87086 URINE CULTURE/COLONY COUNT: CPT | Performed by: NURSE PRACTITIONER

## 2024-08-07 PROCEDURE — 87077 CULTURE AEROBIC IDENTIFY: CPT | Performed by: NURSE PRACTITIONER

## 2024-08-07 PROCEDURE — 99211 OFF/OP EST MAY X REQ PHY/QHP: CPT

## 2024-08-07 RX ORDER — AMOXICILLIN AND CLAVULANATE POTASSIUM 875; 125 MG/1; MG/1
1 TABLET, FILM COATED ORAL EVERY 12 HOURS SCHEDULED
Qty: 14 TABLET | Refills: 0 | Status: SHIPPED | OUTPATIENT
Start: 2024-08-07 | End: 2024-08-14

## 2024-08-07 NOTE — TELEPHONE ENCOUNTER
Patient came into the office today and gave a urine sample. She states her symptoms are pressure, burning, and slight blood in her urine. This has been going on since this morning. We did dip the urine and sent out for urine culture. She states she has come in and given urine samples before. She is allergic to sulfa antibiotics and uses the [facility]. Urine dip results in nurse visit appt from today.

## 2024-08-07 NOTE — TELEPHONE ENCOUNTER
Urine dip is positive. I sent Augmentin to the pharmacy for her. She has tolerated this antibiotic in the past.

## 2024-08-09 LAB
BACTERIA UR CULT: ABNORMAL

## 2024-08-13 ENCOUNTER — TELEPHONE (OUTPATIENT)
Dept: FAMILY MEDICINE CLINIC | Facility: CLINIC | Age: 74
End: 2024-08-13

## 2024-08-13 NOTE — TELEPHONE ENCOUNTER
----- Message from PAYAL Tatum sent at 8/13/2024 12:46 PM EDT -----  Antibiotic she is on will treat the infection, she should take until completed.

## 2024-08-14 NOTE — PROGRESS NOTES
[address] [place] [address] Primary Care        NAME: Tomer De Anda is a 71 y o  female  : 1950    MRN: [id number]  DATE: 2022  TIME: 2:30 PM    Assessment and Plan   Dysfunction of right eustachian tube [H69 81]  1  Dysfunction of right eustachian tube  predniSONE 20 mg tablet   2  Muscle cramps       1  Muscle cramps  - will trial stopping Lipitor, if muscle cramps improve will then start Crestor 10 mg      2  Dysfunction of right eustachian tube    Will trial prednisone to help, recommend flonase as well  - predniSONE 20 mg tablet; Take 1 tablet (20 mg total) by mouth 2 (two) times a day with meals for 5 days  Dispense: 10 tablet; Refill: 0      Patient Instructions     Patient Instructions    Stop Lipitor for the next 1-2 weeks  If the pain improves, we will stop the Lipitor and start Crestor 10mg daily  Chief Complaint     Chief Complaint   Patient presents with    Earache     Right ear pain, 1 week   Leg Pain     Patient has left leg pain  History of Present Illness       Patient here with c/o right ear pain starting about 1 week ago, ear pain into throat and sounds hollow  Having some allergies with congestion, had  Post nasal drip 1 week or 2 ago  Reports that she is having leg pain from her thigh down to her ankle  Pain is not bad right now but it can get really bad at times  Review of Systems   Review of Systems   Constitutional: Negative  Negative for fatigue and fever  HENT: Positive for ear pain  Negative for postnasal drip (resolved), rhinorrhea, sinus pressure and sore throat  Respiratory: Negative  Negative for shortness of breath and wheezing  Musculoskeletal: Positive for myalgias          Muscle cramps       PHQ-2/9 Depression Screening    Little interest or pleasure in doing things: 0 - not at all  Feeling down, depressed, or hopeless: 0 - not at all  Trouble falling or staying asleep, or sleeping too much: 0 - not at all  Feeling tired [facility] contacted our office, stated patient has completed Neoadjuvant chemotherapy.  Patient has had 4 cycles and was not able to tolerate any more cycles.    Oncology wanted our office to know patient is ready for a follow up visit to determine surgical planning   or having little energy: 0 - not at all  Poor appetite or overeatin - not at all  Feeling bad about yourself - or that you are a failure or have let yourself or your family down: 0 - not at all  Trouble concentrating on things, such as reading the newspaper or watching television: 0 - not at all  Moving or speaking so slowly that other people could have noticed   Or the opposite - being so fidgety or restless that you have been moving around a lot more than usual: 0 - not at all  Thoughts that you would be better off dead, or of hurting yourself in some way: 0 - not at all  PHQ-9 Score: 0   PHQ-9 Interpretation: No or Minimal depression         Current Medications       Current Outpatient Medications:     aspirin (ECOTRIN LOW STRENGTH) 81 mg EC tablet, Take 1 tablet (81 mg total) by mouth daily, Disp: 90 tablet, Rfl: 0    atorvastatin (LIPITOR) 40 mg tablet, take 1 tablet by mouth every evening, Disp: 90 tablet, Rfl: 0    latanoprost (XALATAN) 0 005 % ophthalmic solution, Apply to eye, Disp: , Rfl:     magnesium Oxide (MAG-OX) 400 mg TABS, Take 400 mg by mouth 2 (two) times a day, Disp: , Rfl:     magnesium oxide (MAG-OX) 400 mg, Take 1 tablet (400 mg total) by mouth 2 (two) times a day, Disp: 180 tablet, Rfl: 0    meclizine (ANTIVERT) 25 mg tablet, Take 1 tablet (25 mg total) by mouth 3 (three) times a day as needed for dizziness, Disp: 30 tablet, Rfl: 0    omeprazole (PriLOSEC) 20 mg delayed release capsule, Take 1 capsule (20 mg total) by mouth daily before breakfast, Disp: 90 capsule, Rfl: 3    phenazopyridine (PYRIDIUM) 200 mg tablet, Take 1 tablet (200 mg total) by mouth 3 (three) times a day with meals, Disp: 10 tablet, Rfl: 0    predniSONE 20 mg tablet, Take 1 tablet (20 mg total) by mouth 2 (two) times a day with meals for 5 days, Disp: 10 tablet, Rfl: 0    Riboflavin 400 MG TABS, Take 1 tablet (400 mg total) by mouth 2 (two) times a day, Disp: 180 tablet, Rfl: 0    albuterol (PROVENTIL HFA,VENTOLIN HFA) 90 mcg/act inhaler, Inhale 2 puffs every 6 (six) hours as needed (Patient not taking: No sig reported), Disp: , Rfl:     Spiriva Respimat 2 5 MCG/ACT AERS inhaler, , Disp: , Rfl:     Current Allergies     Allergies as of 09/02/2022 - Reviewed 09/02/2022   Allergen Reaction Noted    Ciprofloxacin  02/03/2020    Clindamycin  02/03/2020    Doxycycline  02/03/2020    Fluticasone-salmeterol Other (See Comments) 10/13/2020    Keflex [cephalexin]  02/03/2020    Naproxen Hives and Other (See Comments) 08/03/2017    Nsaids  10/04/2020    Penicillins  10/04/2020    Sulfa antibiotics Hives, Itching, and Swelling 08/03/2017            The following portions of the patient's history were reviewed and updated as appropriate: allergies, current medications, past family history, past medical history, past social history, past surgical history and problem list      Past Medical History:   Diagnosis Date    Cancer ([facility] [address] )     gallbladder    COVID-19     GERD (gastroesophageal reflux disease)     Glaucoma     History of gallbladder cancer     Approx  3 years ago    Hyperlipemia     Lightheadedness 5/3/2021    Murmur     Rheumatic fever     RUQ abdominal pain 5/3/2021       Past Surgical History:   Procedure Laterality Date    CHOLECYSTECTOMY      TONSILLECTOMY  CHILDHOOD       Family History   Problem Relation Age of Onset    Heart attack Mother     Kidney disease Father     Breast cancer Sister 77    No Known Problems Daughter     No Known Problems Maternal Grandmother     No Known Problems Paternal Grandmother     No Known Problems Sister     No Known Problems Maternal Aunt     No Known Problems Maternal Aunt     No Known Problems Maternal Aunt     No Known Problems Paternal Aunt     No Known Problems Paternal Aunt          Medications have been verified          Objective   BP 98/52   Pulse 73   Temp 98 3 °F (36 8 °C)   Ht 5' 3" (1 6 m)   Wt 76 2 kg (168 lb)   SpO2 96%   BMI 29 76 kg/m²        Physical Exam     Physical Exam  Vitals and nursing note reviewed  Constitutional:       General: She is not in acute distress  Appearance: She is well-developed  She is not ill-appearing  HENT:      Head: Normocephalic and atraumatic  Right Ear: A middle ear effusion is present  Neck:      Trachea: No tracheal deviation  Pulmonary:      Effort: Pulmonary effort is normal  No tachypnea, accessory muscle usage or respiratory distress  Breath sounds: No stridor  Musculoskeletal:      Cervical back: No rigidity  Neurological:      Mental Status: She is alert and oriented to person, place, and time  Psychiatric:         Speech: Speech normal          Behavior: Behavior normal  Behavior is cooperative

## 2024-08-16 NOTE — TELEPHONE ENCOUNTER
Called and left message for patient with results and to call the office back with further questions

## 2024-09-11 ENCOUNTER — TELEPHONE (OUTPATIENT)
Age: 74
End: 2024-09-11

## 2024-09-11 NOTE — TELEPHONE ENCOUNTER
Pt thinks she has a bladder infection and would like to come to the office and just provide a urine sample to see what is going on, please advise as I did offer her an appt with her provider tomorrow afternoon

## 2024-09-12 NOTE — TELEPHONE ENCOUNTER
Yes, she can come down and provide urine sample. I can see her when she comes down. Please add her to my schedule when she gets here.

## 2024-09-18 ENCOUNTER — APPOINTMENT (OUTPATIENT)
Dept: LAB | Facility: CLINIC | Age: 74
End: 2024-09-18
Payer: COMMERCIAL

## 2024-09-18 ENCOUNTER — OFFICE VISIT (OUTPATIENT)
Dept: FAMILY MEDICINE CLINIC | Facility: CLINIC | Age: 74
End: 2024-09-18
Payer: COMMERCIAL

## 2024-09-18 VITALS
SYSTOLIC BLOOD PRESSURE: 112 MMHG | TEMPERATURE: 98.6 F | WEIGHT: 168 LBS | RESPIRATION RATE: 16 BRPM | HEART RATE: 72 BPM | DIASTOLIC BLOOD PRESSURE: 70 MMHG | OXYGEN SATURATION: 98 % | BODY MASS INDEX: 29.77 KG/M2 | HEIGHT: 63 IN

## 2024-09-18 DIAGNOSIS — K14.6 BURNING MOUTH SYNDROME: ICD-10-CM

## 2024-09-18 DIAGNOSIS — R00.2 PALPITATIONS: ICD-10-CM

## 2024-09-18 DIAGNOSIS — F41.1 GENERALIZED ANXIETY DISORDER: ICD-10-CM

## 2024-09-18 DIAGNOSIS — R68.2 DRY MOUTH: ICD-10-CM

## 2024-09-18 DIAGNOSIS — R42 DIZZINESS: Primary | ICD-10-CM

## 2024-09-18 DIAGNOSIS — R42 DIZZINESS: ICD-10-CM

## 2024-09-18 LAB
ANION GAP SERPL CALCULATED.3IONS-SCNC: 9 MMOL/L (ref 4–13)
BASOPHILS # BLD AUTO: 0.05 THOUSANDS/ΜL (ref 0–0.1)
BASOPHILS NFR BLD AUTO: 1 % (ref 0–1)
BUN SERPL-MCNC: 24 MG/DL (ref 5–25)
CALCIUM SERPL-MCNC: 8.9 MG/DL (ref 8.4–10.2)
CHLORIDE SERPL-SCNC: 106 MMOL/L (ref 96–108)
CO2 SERPL-SCNC: 27 MMOL/L (ref 21–32)
CREAT SERPL-MCNC: 0.72 MG/DL (ref 0.6–1.3)
EOSINOPHIL # BLD AUTO: 0.34 THOUSAND/ΜL (ref 0–0.61)
EOSINOPHIL NFR BLD AUTO: 5 % (ref 0–6)
ERYTHROCYTE [DISTWIDTH] IN BLOOD BY AUTOMATED COUNT: 12.8 % (ref 11.6–15.1)
GFR SERPL CREATININE-BSD FRML MDRD: 83 ML/MIN/1.73SQ M
GLUCOSE SERPL-MCNC: 112 MG/DL (ref 65–140)
HCT VFR BLD AUTO: 41.5 % (ref 34.8–46.1)
HGB BLD-MCNC: 13.7 G/DL (ref 11.5–15.4)
IMM GRANULOCYTES # BLD AUTO: 0.02 THOUSAND/UL (ref 0–0.2)
IMM GRANULOCYTES NFR BLD AUTO: 0 % (ref 0–2)
LYMPHOCYTES # BLD AUTO: 1.61 THOUSANDS/ΜL (ref 0.6–4.47)
LYMPHOCYTES NFR BLD AUTO: 23 % (ref 14–44)
MCH RBC QN AUTO: 31.6 PG (ref 26.8–34.3)
MCHC RBC AUTO-ENTMCNC: 33 G/DL (ref 31.4–37.4)
MCV RBC AUTO: 96 FL (ref 82–98)
MONOCYTES # BLD AUTO: 0.56 THOUSAND/ΜL (ref 0.17–1.22)
MONOCYTES NFR BLD AUTO: 8 % (ref 4–12)
NEUTROPHILS # BLD AUTO: 4.58 THOUSANDS/ΜL (ref 1.85–7.62)
NEUTS SEG NFR BLD AUTO: 63 % (ref 43–75)
NRBC BLD AUTO-RTO: 0 /100 WBCS
PLATELET # BLD AUTO: 283 THOUSANDS/UL (ref 149–390)
PMV BLD AUTO: 10 FL (ref 8.9–12.7)
POTASSIUM SERPL-SCNC: 3.8 MMOL/L (ref 3.5–5.3)
RBC # BLD AUTO: 4.34 MILLION/UL (ref 3.81–5.12)
SL AMB  POCT GLUCOSE, UA: NEGATIVE
SL AMB LEUKOCYTE ESTERASE,UA: NORMAL
SL AMB POCT BILIRUBIN,UA: NEGATIVE
SL AMB POCT BLOOD,UA: NEGATIVE
SL AMB POCT CLARITY,UA: CLEAR
SL AMB POCT COLOR,UA: YELLOW
SL AMB POCT KETONES,UA: NEGATIVE
SL AMB POCT NITRITE,UA: NEGATIVE
SL AMB POCT PH,UA: 5.5
SL AMB POCT SPECIFIC GRAVITY,UA: 1.03
SL AMB POCT URINE PROTEIN: NEGATIVE
SL AMB POCT UROBILINOGEN: 0.2
SODIUM SERPL-SCNC: 142 MMOL/L (ref 135–147)
TSH SERPL DL<=0.05 MIU/L-ACNC: 1.75 UIU/ML (ref 0.45–4.5)
WBC # BLD AUTO: 7.16 THOUSAND/UL (ref 4.31–10.16)

## 2024-09-18 PROCEDURE — 99214 OFFICE O/P EST MOD 30 MIN: CPT | Performed by: NURSE PRACTITIONER

## 2024-09-18 PROCEDURE — 93000 ELECTROCARDIOGRAM COMPLETE: CPT | Performed by: NURSE PRACTITIONER

## 2024-09-18 PROCEDURE — 36415 COLL VENOUS BLD VENIPUNCTURE: CPT

## 2024-09-18 PROCEDURE — 81002 URINALYSIS NONAUTO W/O SCOPE: CPT | Performed by: NURSE PRACTITIONER

## 2024-09-18 PROCEDURE — 84443 ASSAY THYROID STIM HORMONE: CPT

## 2024-09-18 PROCEDURE — 85025 COMPLETE CBC W/AUTO DIFF WBC: CPT

## 2024-09-18 PROCEDURE — 80048 BASIC METABOLIC PNL TOTAL CA: CPT

## 2024-09-18 PROCEDURE — 86038 ANTINUCLEAR ANTIBODIES: CPT

## 2024-09-18 PROCEDURE — 86235 NUCLEAR ANTIGEN ANTIBODY: CPT

## 2024-09-18 NOTE — PROGRESS NOTES
Ambulatory Visit  Name: Jolanta Villatoro      : 1950      MRN: [id number]  Encounter Provider: PAYAL Foreman  Encounter Date: 2024   Encounter department: [facility] PRIMARY CARE    Assessment & Plan  Dry mouth  Rule out sjogrens disease.   Orders:    Sjogren's Antibodies; Future    ILDA Screen w/ Reflex to Titer/Pattern; Future    CBC and differential; Future    Burning mouth syndrome    Orders:    Sjogren's Antibodies; Future    ILDA Screen w/ Reflex to Titer/Pattern; Future    CBC and differential; Future    Palpitations  EKG showing NSR and RBBB.     Orders:    POCT ECG    Dizziness  Urine dip is normal.   Orders:    POCT ECG    POCT urine dip    Basic metabolic panel; Future    TSH, 3rd generation with Free T4 reflex; Future    Generalized anxiety disorder    Orders:    TSH, 3rd generation with Free T4 reflex; Future         History of Present Illness     Patient here with multiple complaints. Reports burning and dry mouth, burning lips. Did have a recent cold, does have osme post nasal drip. Pain in back in shoulder blades since she got sick.  Fatigue.           Review of Systems   Constitutional:  Positive for fatigue.   HENT:  Positive for congestion.         Burning mouth     Respiratory:  Positive for chest tightness. Negative for shortness of breath and wheezing.    Cardiovascular:  Positive for palpitations.   Musculoskeletal:  Positive for back pain.     Past Medical History:   Diagnosis Date    Cancer (HCC)     gallbladder    COVID-19     GERD (gastroesophageal reflux disease)     Glaucoma     History of gallbladder cancer     Approx. 3 years ago    Hyperlipemia     Lightheadedness 5/3/2021    Murmur     Rheumatic fever     RUQ abdominal pain 5/3/2021     Past Surgical History:   Procedure Laterality Date    CHOLECYSTECTOMY      TONSILLECTOMY  CHILDHOOD     Family History   Problem Relation Age of Onset    Heart attack Mother     Kidney disease Father     Breast cancer  Sister 66    No Known Problems Daughter     No Known Problems Maternal Grandmother     No Known Problems Paternal Grandmother     No Known Problems Sister     No Known Problems Maternal Aunt     No Known Problems Maternal Aunt     No Known Problems Maternal Aunt     No Known Problems Paternal Aunt     No Known Problems Paternal Aunt      Social History     Tobacco Use    Smoking status: Former     Current packs/day: 0.00     Average packs/day: 1 pack/day for 40.0 years (40.0 ttl pk-yrs)     Types: Cigarettes     Start date: 3/10/1972     Quit date: 3/10/2012     Years since quittin.5    Smokeless tobacco: Never   Vaping Use    Vaping status: Never Used   Substance and Sexual Activity    Alcohol use: Never    Drug use: Never    Sexual activity: Not Currently     Current Outpatient Medications on File Prior to Visit   Medication Sig    acyclovir (ZOVIRAX) 5 % ointment Apply 1 application. topically daily as needed As directed    albuterol (PROVENTIL HFA,VENTOLIN HFA) 90 mcg/act inhaler Inhale 2 puffs every 6 (six) hours as needed for wheezing or shortness of breath    aspirin (ECOTRIN LOW STRENGTH) 81 mg EC tablet Take 1 tablet (81 mg total) by mouth daily    azelastine (ASTELIN) 0.1 % nasal spray 1 spray into each nostril 2 (two) times a day Use in each nostril as directed    cyclobenzaprine (FLEXERIL) 10 mg tablet Take 1 tablet (10 mg total) by mouth daily at bedtime    ipratropium (ATROVENT) 0.03 % nasal spray 2 sprays into each nostril 3 (three) times a day as needed for rhinitis    latanoprost (XALATAN) 0.005 % ophthalmic solution Apply to eye    magnesium Oxide (MAG-OX) 400 mg TABS Take 1 tablet (400 mg total) by mouth 2 (two) times a day    meclizine (ANTIVERT) 25 mg tablet Take 1 tablet (25 mg total) by mouth 3 (three) times a day as needed for dizziness    omeprazole (PriLOSEC) 40 MG capsule Take 1 capsule (40 mg total) by mouth daily    Riboflavin 400 MG TABS Take 1 Tablet by mouth in the morning and 1  "Tablet before bedtime.    Spiriva Respimat 2.5 MCG/ACT AERS inhaler Inhale 2 puffs daily    triamcinolone (KENALOG) 0.1 % cream Apply topically 2 (two) times a day    Fluticasone-Salmeterol (Advair) 100-50 mcg/dose inhaler Inhale 1 puff 2 (two) times a day Rinse mouth after use.    sucralfate (CARAFATE) 1 g tablet take 1 tablet by mouth three times a day     Allergies   Allergen Reactions    Ciprofloxacin     Clindamycin     Fluticasone-Salmeterol Other (See Comments)     Leg cramps    Keflex [Cephalexin]     Naproxen Hives and Other (See Comments)     \"hives & chest pain\"     Nsaids     Sulfa Antibiotics Hives, Itching and Swelling    Atorvastatin Myalgia     Immunization History   Administered Date(s) Administered    INFLUENZA 09/14/2017    Tdap 04/06/2021     Objective     /70   Pulse 72   Temp 98.6 °F (37 °C) (Tympanic)   Resp 16   Ht 5' 3\" (1.6 m)   Wt 76.2 kg (168 lb)   SpO2 98%   BMI 29.76 kg/m²     Physical Exam  Vitals and nursing note reviewed.   Constitutional:       General: She is not in acute distress.     Appearance: She is well-developed and normal weight. She is not diaphoretic.   HENT:      Head: Normocephalic and atraumatic.      Mouth/Throat:      Mouth: Mucous membranes are dry.      Tongue: No lesions.      Palate: Mass present.      Pharynx: No pharyngeal swelling, oropharyngeal exudate or uvula swelling.      Tonsils: No tonsillar exudate.     Pulmonary:      Effort: Pulmonary effort is normal. No tachypnea or respiratory distress.      Breath sounds: Normal breath sounds.   Neurological:      Mental Status: She is alert and oriented to person, place, and time.   Psychiatric:         Mood and Affect: Mood and affect normal.         Speech: Speech normal.         Behavior: Behavior normal. Behavior is cooperative.         Thought Content: Thought content normal.         Cognition and Memory: Cognition and memory normal.         Judgment: Judgment normal.         "

## 2024-09-19 LAB
ANA SER QL IA: NEGATIVE
ENA SS-A AB SER-ACNC: <0.2 AI (ref 0–0.9)
ENA SS-B AB SER-ACNC: <0.2 AI (ref 0–0.9)

## 2024-09-27 ENCOUNTER — CLINICAL SUPPORT (OUTPATIENT)
Dept: FAMILY MEDICINE CLINIC | Facility: CLINIC | Age: 74
End: 2024-09-27

## 2024-09-27 DIAGNOSIS — R39.9 UTI SYMPTOMS: Primary | ICD-10-CM

## 2024-09-27 PROCEDURE — 87086 URINE CULTURE/COLONY COUNT: CPT | Performed by: NURSE PRACTITIONER

## 2024-09-27 PROCEDURE — 87186 SC STD MICRODIL/AGAR DIL: CPT | Performed by: NURSE PRACTITIONER

## 2024-09-27 PROCEDURE — 87077 CULTURE AEROBIC IDENTIFY: CPT | Performed by: NURSE PRACTITIONER

## 2024-09-29 ENCOUNTER — NURSE TRIAGE (OUTPATIENT)
Dept: OTHER | Facility: OTHER | Age: 74
End: 2024-09-29

## 2024-09-29 ENCOUNTER — DOCUMENTATION (OUTPATIENT)
Dept: FAMILY MEDICINE CLINIC | Facility: CLINIC | Age: 74
End: 2024-09-29

## 2024-09-29 DIAGNOSIS — R39.9 UTI SYMPTOMS: Primary | ICD-10-CM

## 2024-09-29 LAB
BACTERIA UR CULT: ABNORMAL
BACTERIA UR CULT: ABNORMAL

## 2024-09-29 NOTE — TELEPHONE ENCOUNTER
"Patient calling in following up on her urine test results. On call provider contacted, sent in a prescription for Augmentin at this time to the [facility] pharmacy. Patient made aware. Instructed her to call back today with any concerns or new/worsening symptoms and told her to follow up with the office tomorrow regarding final urine culture results. Patient verbalized understanding.     Answer Assessment - Initial Assessment Questions  1. TEST: \"What kind of urine test was performed?\" (e.g., urinalysis, urine dipstick)       Urine culture     2. URINALYSIS RESULT: \"Was it positive or negative?\"  If positive, document what was positive (e.g., LE, nitrites, WBC, RBC, bacteria, epithelial cells)      Preliminary result- 50-59,000 Gram Negative Rods       Last resulted 9/28 1655    3. FEVER: \"Do you have a fever?\" If Yes, ask: \"What is your temperature, how was it measured, and when did it start?\"      Denies     4. FLANK PAIN: \"Do you have any pain in your side?\"      Yes- bilateral lower back pain 5/10    5. OTHER SYMPTOMS: \"Do you have any other symptoms?\" (e.g., blood in urine, vomiting)      Urinary frequency       Burning with and without urination 10/10      Had pink tinged urine yesterday    Protocols used: Urinalysis Results Follow-up Call-Adult-[facility]    "

## 2024-09-29 NOTE — TELEPHONE ENCOUNTER
Reason for Disposition  • [1] POSITIVE urine test AND [2] side (flank) or lower back pain present    Protocols used: Urinalysis Results Follow-up Call-Adult-

## 2024-09-30 NOTE — TELEPHONE ENCOUNTER
Patient called to confirm medication name, confirmed with medication list. Patient expressed understanding.

## 2024-09-30 NOTE — TELEPHONE ENCOUNTER
Please advise pt that her urine culture shows susceptibility to augmentin. Please have her follow up with PCP if symptoms worsen, recur or do not improve.

## 2024-11-11 ENCOUNTER — CLINICAL SUPPORT (OUTPATIENT)
Dept: FAMILY MEDICINE CLINIC | Facility: CLINIC | Age: 74
End: 2024-11-11
Payer: COMMERCIAL

## 2024-11-11 ENCOUNTER — TELEPHONE (OUTPATIENT)
Dept: FAMILY MEDICINE CLINIC | Facility: CLINIC | Age: 74
End: 2024-11-11

## 2024-11-11 DIAGNOSIS — R39.9 UTI SYMPTOMS: ICD-10-CM

## 2024-11-11 DIAGNOSIS — R39.9 UTI SYMPTOMS: Primary | ICD-10-CM

## 2024-11-11 LAB
BACTERIA UR QL AUTO: ABNORMAL /HPF
BILIRUB UR QL STRIP: NEGATIVE
CLARITY UR: ABNORMAL
COLOR UR: YELLOW
GLUCOSE UR STRIP-MCNC: NEGATIVE MG/DL
HGB UR QL STRIP.AUTO: ABNORMAL
KETONES UR STRIP-MCNC: NEGATIVE MG/DL
LEUKOCYTE ESTERASE UR QL STRIP: ABNORMAL
NITRITE UR QL STRIP: NEGATIVE
NON-SQ EPI CELLS URNS QL MICRO: ABNORMAL /HPF
PH UR STRIP.AUTO: 6 [PH]
PROT UR STRIP-MCNC: ABNORMAL MG/DL
RBC #/AREA URNS AUTO: ABNORMAL /HPF
SP GR UR STRIP.AUTO: 1.01 (ref 1–1.03)
UROBILINOGEN UR STRIP-ACNC: <2 MG/DL
WBC #/AREA URNS AUTO: ABNORMAL /HPF

## 2024-11-11 PROCEDURE — 87086 URINE CULTURE/COLONY COUNT: CPT | Performed by: NURSE PRACTITIONER

## 2024-11-11 PROCEDURE — 81001 URINALYSIS AUTO W/SCOPE: CPT

## 2024-11-11 PROCEDURE — 99211 OFF/OP EST MAY X REQ PHY/QHP: CPT

## 2024-11-12 LAB — BACTERIA UR CULT: NORMAL

## 2024-11-13 ENCOUNTER — RESULTS FOLLOW-UP (OUTPATIENT)
Dept: FAMILY MEDICINE CLINIC | Facility: CLINIC | Age: 74
End: 2024-11-13

## 2024-11-13 DIAGNOSIS — R39.9 UTI SYMPTOMS: Primary | ICD-10-CM

## 2024-11-13 RX ORDER — PHENAZOPYRIDINE HYDROCHLORIDE 100 MG/1
100 TABLET, FILM COATED ORAL 3 TIMES DAILY PRN
Qty: 10 TABLET | Refills: 0 | Status: SHIPPED | OUTPATIENT
Start: 2024-11-13

## 2024-11-13 NOTE — TELEPHONE ENCOUNTER
Patient was wondering if she could get results of her urine or an antibiotic, she states it was manageable yesterday but today the pain is bad again, please advise

## 2024-11-18 ENCOUNTER — TELEPHONE (OUTPATIENT)
Age: 74
End: 2024-11-18

## 2024-11-18 ENCOUNTER — NURSE TRIAGE (OUTPATIENT)
Age: 74
End: 2024-11-18

## 2024-11-18 DIAGNOSIS — N39.0 RECURRENT UTI: Primary | ICD-10-CM

## 2024-11-18 DIAGNOSIS — N39.0 FREQUENT UTI: Primary | ICD-10-CM

## 2024-11-18 DIAGNOSIS — K21.9 GASTROESOPHAGEAL REFLUX DISEASE WITHOUT ESOPHAGITIS: ICD-10-CM

## 2024-11-18 DIAGNOSIS — R39.9 UTI SYMPTOMS: ICD-10-CM

## 2024-11-18 NOTE — TELEPHONE ENCOUNTER
Established pt calling with painful uti symptoms,burning and pelvic pain & pressure,warm transfer to [facility]

## 2024-11-18 NOTE — TELEPHONE ENCOUNTER
Patient requesting referral to a specialist to address recurring UTI.    She would like a provider in the [place] area if possible.    Please contact patient after referral is placed.

## 2024-11-18 NOTE — TELEPHONE ENCOUNTER
Called and spoke with Jolanta who reports she provided a urine sample at [facility] in [place] this afternoon. Advised that PAYAL Friedman would like to await final urine culture prior to ordering antibiotics. If office does not receive a faxed copy of urine culture results from [facility] will need to follow up with them later this week.

## 2024-11-18 NOTE — TELEPHONE ENCOUNTER
Patient of Dr. Diggs last seen in the [place] office called in with concerns of dysuria and urinary frequency that has been ongoing since October. Patient reports having flank pain and bladder discomfort. Denies any fevers, hematuria, or difficulty urinating. Advised to increase water intake and avoid bladder irritants. ED precautions reviewed. Placed urine orders to rule out infection. Faxed orders to [facility] at [phone]      Reason for Disposition   The patient has an unknown case of mild dysuria    Answer Assessment - Initial Assessment Questions  1. When did your symptoms start?   October of this year   2. Do you experience pain or burning with every void?   Yes   3. Do you have any other urinary symptoms such as urinary frequency, urgency, incontinence, blood in your urine?   Urgency, frequency   4. Do you have any abdominal pain or flank pain?  Flank pain   5. Do you have a fever of 101 or higher? How did you take your temperature?   No   7. Have you become unable to urinate?   No   8. Do you have a history of urinary tract infections?   Yes   9. Have you had a recent urologic procedure, surgery, or any recent urine testing?   Urine testing last week    Protocols used: Urology-Dysuria-ADULT-[place]

## 2024-11-18 NOTE — TELEPHONE ENCOUNTER
Agree with urine testing and we will monitor those results.  Given chronic nature of symptoms I wish to wait on antibiotics until final culture results.  If urine culture is negative we may need to consider updated baseline imaging with either ultrasound or CT.   Yes

## 2024-11-19 RX ORDER — OMEPRAZOLE 40 MG/1
CAPSULE, DELAYED RELEASE ORAL
Qty: 90 CAPSULE | Refills: 1 | Status: SHIPPED | OUTPATIENT
Start: 2024-11-19

## 2024-11-20 NOTE — TELEPHONE ENCOUNTER
Called and spoke with [facility] in [place] (P: [phone]). They state patients urine culture finalized this morning. They will fax a copy of the results to the [place] office at [phone] for review. Await fax.

## 2024-11-20 NOTE — TELEPHONE ENCOUNTER
Called and left a detailed voicemail message for patient with the results of her urine culture. Advised that a script for Augmentin was sent to her [facility] to take twice a day for 7 days. Call back number left in message if patient has questions.

## 2024-11-25 ENCOUNTER — TELEPHONE (OUTPATIENT)
Dept: UROLOGY | Facility: CLINIC | Age: 74
End: 2024-11-25

## 2024-11-30 ENCOUNTER — NURSE TRIAGE (OUTPATIENT)
Dept: OTHER | Facility: OTHER | Age: 74
End: 2024-11-30

## 2024-11-30 NOTE — TELEPHONE ENCOUNTER
Regarding: Bit by tick  ----- Message from Skye PEREZ sent at 11/30/2024  8:13 AM EST -----  I got bit by a tick. Where should I go to get tested ?

## 2024-11-30 NOTE — TELEPHONE ENCOUNTER
"Reason for Disposition   [1] Red or very tender (to touch) area AND [2] started over 24 hours after the bite    Answer Assessment - Initial Assessment Questions  1. ATTACHED:  \"Is the tick still on the skin?\"  (e.g., yes, no, unsure)      no    2. ONSET - TICK STILL ATTACHED:  \"How long do you think the tick has been on your skin?\" (e.g., hours, days, unsure)  Note:  Is there a recent activity (camping, hiking) where the caller may have been exposed?      Unsure    3. ONSET - TICK NOT STILL ATTACHED: \"If the tick has been removed, how long do you think the tick was attached before you removed it?\" (e.g., 5 hours, 2 days). \"When was this?\"     Monday noticed tick     4. LOCATION: \"Where is the tick bite located?\" (e.g., arm, leg)      Right leg    5. TYPE of TICK: \"Is it a wood tick or a deer tick?\" (e.g., deer tick, wood tick; unsure)      Unsure      6. OTHER SYMPTOMS: \"Do you have any other symptoms?\" (e.g., fever, rash, redness at bite area, red ring around bite)      Swollen hands , redness around bite    Protocols used: Tick Bite-Adult-      Patient will go to urgent care to be evaluated.   "

## 2024-12-03 NOTE — TELEPHONE ENCOUNTER
Please call patient. She never went to urgent care. Is she still having redness and swelling? Does she need an appointment?

## 2024-12-06 NOTE — TELEPHONE ENCOUNTER
Patient returned call, She states she no longer has any redness and denies swelling. Patient has an upcoming appt. Scheduled on 10/20/24

## 2024-12-16 ENCOUNTER — TELEPHONE (OUTPATIENT)
Age: 74
End: 2024-12-16
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
## 2024-12-16 DIAGNOSIS — R25.2 MUSCLE CRAMPS: ICD-10-CM

## 2024-12-16 LAB
ALBUMIN SERPL-MCNC: 4.3 G/DL (ref 3.5–5.7)
ALP SERPL-CCNC: 104 U/L (ref 35–120)
ALT SERPL-CCNC: 11 U/L
ANION GAP SERPL CALCULATED.3IONS-SCNC: 11 MMOL/L (ref 3–11)
AST SERPL-CCNC: 16 U/L
BACTERIA URNS QL MICRO: ABNORMAL
BASOPHILS # BLD AUTO: 0 THOU/CMM (ref 0–0.1)
BASOPHILS NFR BLD AUTO: 0 %
BILIRUB SERPL-MCNC: 0.6 MG/DL (ref 0.2–1)
BUN SERPL-MCNC: 14 MG/DL (ref 7–25)
CALCIUM SERPL-MCNC: 9.3 MG/DL (ref 8.5–10.5)
CHLORIDE SERPL-SCNC: 104 MMOL/L (ref 100–109)
CHOLEST SERPL-MCNC: 162 MG/DL
CHOLEST/HDLC SERPL: 3.4 {RATIO}
CO2 SERPL-SCNC: 27 MMOL/L (ref 21–31)
CREAT SERPL-MCNC: 0.78 MG/DL (ref 0.4–1.1)
CYTOLOGY CMNT CVX/VAG CYTO-IMP: ABNORMAL
DIFFERENTIAL METHOD BLD: ABNORMAL
EOSINOPHIL # BLD AUTO: 0.2 THOU/CMM (ref 0–0.5)
EOSINOPHIL NFR BLD AUTO: 4 %
ERYTHROCYTE [DISTWIDTH] IN BLOOD BY AUTOMATED COUNT: 13.7 % (ref 12–16)
EST. AVERAGE GLUCOSE BLD GHB EST-MCNC: 117 MG/DL
GFR/BSA.PRED SERPLBLD CYS-BASED-ARV: 80 ML/MIN/{1.73_M2}
GLUCOSE SERPL-MCNC: 107 MG/DL (ref 65–99)
GLUCOSE UR QL STRIP: NEGATIVE MG/DL
HBA1C MFR BLD: 5.7 %
HCT VFR BLD AUTO: 43.2 % (ref 35–43)
HDLC SERPL-MCNC: 47 MG/DL (ref 23–92)
HGB BLD-MCNC: 14.7 G/DL (ref 11.5–14.5)
HGB UR QL STRIP: NEGATIVE MG/DL
KETONES UR QL STRIP: NEGATIVE MG/DL
LDLC SERPL CALC-MCNC: 99 MG/DL
LEUKOCYTE ESTERASE UR QL STRIP: NEGATIVE /UL
LYMPHOCYTES # BLD AUTO: 1 THOU/CMM (ref 1–3)
LYMPHOCYTES NFR BLD AUTO: 16 %
MCH RBC QN AUTO: 31.7 PG (ref 26–34)
MCHC RBC AUTO-ENTMCNC: 33.9 G/DL (ref 32–37)
MCV RBC AUTO: 94 FL (ref 80–100)
MONOCYTES # BLD AUTO: 0.8 THOU/CMM (ref 0.3–1)
MONOCYTES NFR BLD AUTO: 12 %
MUCOUS THREADS URNS QL MICRO: ABNORMAL
NEUTROPHILS # BLD AUTO: 4.3 THOU/CMM (ref 1.8–7.8)
NEUTROPHILS NFR BLD AUTO: 68 %
NITRITE UR QL STRIP: POSITIVE
NONHDLC SERPL-MCNC: 115 MG/DL
PH UR: 5 [PH] (ref 4.5–8)
PLATELET # BLD AUTO: 239 THOU/CMM (ref 140–350)
PMV BLD REES-ECKER: 8.2 FL (ref 7.5–11.3)
POTASSIUM SERPL-SCNC: 4.5 MMOL/L (ref 3.5–5.2)
PROT 24H UR-MRATE: NEGATIVE MG/DL
PROT SERPL-MCNC: 7.1 G/DL (ref 6.3–8.3)
RBC # BLD AUTO: 4.62 MILL/CMM (ref 3.7–4.7)
RBC #/AREA URNS HPF: ABNORMAL /HPF (ref 0–2)
RENAL EPI CELLS #/AREA URNS HPF: ABNORMAL /HPF (ref 0–1)
SL AMB POCT URINE COMMENT: ABNORMAL
SODIUM SERPL-SCNC: 142 MMOL/L (ref 135–145)
SP GR UR: 1.02 (ref 1–1.03)
SQUAMOUS #/AREA URNS HPF: >10 /LPF (ref 0–5)
TRIGL SERPL-MCNC: 81 MG/DL
WBC # BLD AUTO: 6.3 THOU/CMM (ref 4–10)
WBC #/AREA URNS HPF: ABNORMAL /HPF (ref 0–5)

## 2024-12-16 NOTE — TELEPHONE ENCOUNTER
Patient has appt on 12/20/2024, and wanted to know if you wanted the labs done from 12/19/2023 and the urine from 11/18/2024 and she also would like a lyme test added due to being bite by tick and is having joint issues. Can anyone else enter the labs due to Abby being out today?    Please call patient back at [phone] she has not had anything to eat yet and wanted to have labs done today. Please let her know asap thank you.

## 2024-12-16 NOTE — TELEPHONE ENCOUNTER
Spoke with providers in office, they were okay with adding lyms testing into the labwork with dx tick bite.

## 2024-12-17 ENCOUNTER — RA CDI HCC (OUTPATIENT)
Dept: OTHER | Facility: HOSPITAL | Age: 74
End: 2024-12-17

## 2024-12-17 LAB — LEGIONELLA SPEC CULT: NORMAL

## 2024-12-17 RX ORDER — LANOLIN ALCOHOL/MO/W.PET/CERES
400 CREAM (GRAM) TOPICAL 2 TIMES DAILY
Qty: 60 TABLET | Refills: 5 | Status: SHIPPED | OUTPATIENT
Start: 2024-12-17

## 2024-12-17 NOTE — TELEPHONE ENCOUNTER
Patient called back to review labs from yesterday.  She said her whole body hurts, nausea, dry mouth and is very tired.  She said symptoms are getting worse.  It does not look like Lyme test was completed yesterday.      Tried to schedule for appointment today, no available appointments.      Please advise    Thank you

## 2024-12-17 NOTE — TELEPHONE ENCOUNTER
Patient returning call.  She stated she plans to go to the hospital to have the labs drawn this time instead of the lab in [place].

## 2024-12-18 ENCOUNTER — RESULTS FOLLOW-UP (OUTPATIENT)
Dept: FAMILY MEDICINE CLINIC | Facility: CLINIC | Age: 74
End: 2024-12-18

## 2024-12-18 ENCOUNTER — RESULTS FOLLOW-UP (OUTPATIENT)
Dept: UROLOGY | Facility: CLINIC | Age: 74
End: 2024-12-18

## 2024-12-18 DIAGNOSIS — A69.20 LYME DISEASE: Primary | ICD-10-CM

## 2024-12-18 DIAGNOSIS — N30.00 ACUTE CYSTITIS WITHOUT HEMATURIA: Primary | ICD-10-CM

## 2024-12-18 LAB
B BURGDOR AB SER-IMP: ABNORMAL
B BURGDOR IGG+IGM SER-ACNC: POSITIVE INDEX
B BURGDOR IGM SER IA-ACNC: POSITIVE INDEX

## 2024-12-18 RX ORDER — DOXYCYCLINE HYCLATE 100 MG
100 TABLET ORAL 2 TIMES DAILY
Qty: 60 TABLET | Refills: 0 | Status: SHIPPED | OUTPATIENT
Start: 2024-12-18 | End: 2025-01-17

## 2024-12-18 RX ORDER — NITROFURANTOIN 25; 75 MG/1; MG/1
100 CAPSULE ORAL 2 TIMES DAILY
Qty: 14 CAPSULE | Refills: 0 | Status: SHIPPED | OUTPATIENT
Start: 2024-12-18 | End: 2024-12-25

## 2024-12-18 NOTE — TELEPHONE ENCOUNTER
Patient was returning a call to the office. Relayed the messages from the previous encounters. She will be picking up the medication from the pharmacy    She was wondering if it was okay to take with Mucinex and Doxycycline 100 mg. She just got them yesterday for her cold.     And one follow up concern as well. She would like to know if there was anything she could be doing differently to prevent the recurrent UTI's she has been getting    Requesting a call back to discuss

## 2024-12-18 NOTE — RESULT ENCOUNTER NOTE
Please let patient know that her urine culture is positive for E. coli.  I sent a prescription for Macrobid to her pharmacy.

## 2024-12-18 NOTE — TELEPHONE ENCOUNTER
----- Message from PAYAL Catalan sent at 12/18/2024 11:39 AM EST -----  She is okay to take Macrobid with doxycycline and Mucinex. Please schedule her for follow-up visit to discuss ways to help prevent UTIs as she was last seen by Dr. Diggs in January

## 2024-12-18 NOTE — TELEPHONE ENCOUNTER
Called patient and informed her that it is okay to take Macrobid with doxycycline and Mucinex. Informed her that it is recommended to schedule her for follow-up visit to discuss ways to help prevent UTIs as she was last seen by Dr. Diggs in January. Patient was scheduled for 1/29/25 at 1:00pm with Dr. Diggs at the [facility] office. Patient confirmed date, time, and location.

## 2024-12-19 LAB
B BURGDOR AB SER-IMP: ABNORMAL
B BURGDOR IGG SER QL IB: NEGATIVE
B BURGDOR IGM SER QL IB: NEGATIVE
B BURGDOR18KD IGG SER QL IB: ABNORMAL
B BURGDOR23KD IGG SER QL IB: ABNORMAL
B BURGDOR23KD IGM SER QL IB: ABNORMAL
B BURGDOR28KD IGG SER QL IB: ABNORMAL
B BURGDOR30KD IGG SER QL IB: ABNORMAL
B BURGDOR39KD IGG SER QL IB: ABNORMAL
B BURGDOR39KD IGM SER QL IB: ABNORMAL
B BURGDOR41KD IGG SER QL IB: PRESENT
B BURGDOR41KD IGM SER QL IB: ABNORMAL
B BURGDOR45KD IGG SER QL IB: PRESENT
B BURGDOR58KD IGG SER QL IB: PRESENT
B BURGDOR66KD IGG SER QL IB: ABNORMAL
B BURGDOR93KD IGG SER QL IB: PRESENT

## 2024-12-20 ENCOUNTER — OFFICE VISIT (OUTPATIENT)
Dept: FAMILY MEDICINE CLINIC | Facility: CLINIC | Age: 74
End: 2024-12-20
Payer: COMMERCIAL

## 2024-12-20 VITALS
WEIGHT: 164 LBS | TEMPERATURE: 98.3 F | DIASTOLIC BLOOD PRESSURE: 68 MMHG | BODY MASS INDEX: 29.06 KG/M2 | SYSTOLIC BLOOD PRESSURE: 120 MMHG | HEART RATE: 92 BPM | OXYGEN SATURATION: 94 % | HEIGHT: 63 IN

## 2024-12-20 DIAGNOSIS — F17.211 NICOTINE DEPENDENCE, CIGARETTES, IN REMISSION: ICD-10-CM

## 2024-12-20 DIAGNOSIS — K21.9 GASTROESOPHAGEAL REFLUX DISEASE WITHOUT ESOPHAGITIS: ICD-10-CM

## 2024-12-20 DIAGNOSIS — R73.03 PREDIABETES: ICD-10-CM

## 2024-12-20 DIAGNOSIS — E78.1 PURE HYPERTRIGLYCERIDEMIA: ICD-10-CM

## 2024-12-20 DIAGNOSIS — A69.20 LYME DISEASE: ICD-10-CM

## 2024-12-20 DIAGNOSIS — M54.50 ACUTE RIGHT-SIDED LOW BACK PAIN WITHOUT SCIATICA: ICD-10-CM

## 2024-12-20 DIAGNOSIS — Z12.31 ENCOUNTER FOR SCREENING MAMMOGRAM FOR BREAST CANCER: ICD-10-CM

## 2024-12-20 DIAGNOSIS — Z00.00 MEDICARE ANNUAL WELLNESS VISIT, SUBSEQUENT: Primary | ICD-10-CM

## 2024-12-20 PROCEDURE — G0439 PPPS, SUBSEQ VISIT: HCPCS | Performed by: NURSE PRACTITIONER

## 2024-12-20 PROCEDURE — 99213 OFFICE O/P EST LOW 20 MIN: CPT | Performed by: NURSE PRACTITIONER

## 2024-12-20 RX ORDER — CYCLOBENZAPRINE HCL 10 MG
10 TABLET ORAL
Qty: 30 TABLET | Refills: 0 | Status: SHIPPED | OUTPATIENT
Start: 2024-12-20

## 2024-12-20 NOTE — PATIENT INSTRUCTIONS
Medicare Preventive Visit Patient Instructions  Thank you for completing your Welcome to Medicare Visit or Medicare Annual Wellness Visit today. Your next wellness visit will be due in one year (12/21/2025).  The screening/preventive services that you may require over the next 5-10 years are detailed below. Some tests may not apply to you based off risk factors and/or age. Screening tests ordered at today's visit but not completed yet may show as past due. Also, please note that scanned in results may not display below.  Preventive Screenings:  Service Recommendations Previous Testing/Comments   Colorectal Cancer Screening  * Colonoscopy    * Fecal Occult Blood Test (FOBT)/Fecal Immunochemical Test (FIT)  * Fecal DNA/Cologuard Test  * Flexible Sigmoidoscopy Age: 45-75 years old   Colonoscopy: every 10 years (may be performed more frequently if at higher risk)  OR  FOBT/FIT: every 1 year  OR  Cologuard: every 3 years  OR  Sigmoidoscopy: every 5 years  Screening may be recommended earlier than age 45 if at higher risk for colorectal cancer. Also, an individualized decision between you and your healthcare provider will decide whether screening between the ages of 76-85 would be appropriate. Colonoscopy: Not on file  FOBT/FIT: Not on file  Cologuard: Not on file  Sigmoidoscopy: Not on file          Breast Cancer Screening Age: 40+ years old  Frequency: every 1-2 years  Not required if history of left and right mastectomy Mammogram: 12/22/2023    Screening Current   Cervical Cancer Screening Between the ages of 21-29, pap smear recommended once every 3 years.   Between the ages of 30-65, can perform pap smear with HPV co-testing every 5 years.   Recommendations may differ for women with a history of total hysterectomy, cervical cancer, or abnormal pap smears in past. Pap Smear: Not on file    Screening Not Indicated   Hepatitis C Screening Once for adults born between 1945 and 1965  More frequently in patients at high  risk for Hepatitis C Hep C Antibody: Not on file        Diabetes Screening 1-2 times per year if you're at risk for diabetes or have pre-diabetes Fasting glucose: 105 mg/dL (6/20/2023)  A1C: 5.7 % (12/16/2024)  Screening Current   Cholesterol Screening Once every 5 years if you don't have a lipid disorder. May order more often based on risk factors. Lipid panel: 12/16/2024    Screening Not Indicated  History Lipid Disorder     Other Preventive Screenings Covered by Medicare:  Abdominal Aortic Aneurysm (AAA) Screening: covered once if your at risk. You're considered to be at risk if you have a family history of AAA.  Lung Cancer Screening: covers low dose CT scan once per year if you meet all of the following conditions: (1) Age 55-77; (2) No signs or symptoms of lung cancer; (3) Current smoker or have quit smoking within the last 15 years; (4) You have a tobacco smoking history of at least 20 pack years (packs per day multiplied by number of years you smoked); (5) You get a written order from a healthcare provider.  Glaucoma Screening: covered annually if you're considered high risk: (1) You have diabetes OR (2) Family history of glaucoma OR (3)  aged 50 and older OR (4)  American aged 65 and older  Osteoporosis Screening: covered every 2 years if you meet one of the following conditions: (1) You're estrogen deficient and at risk for osteoporosis based off medical history and other findings; (2) Have a vertebral abnormality; (3) On glucocorticoid therapy for more than 3 months; (4) Have primary hyperparathyroidism; (5) On osteoporosis medications and need to assess response to drug therapy.   Last bone density test (DXA Scan): 12/13/2021.  HIV Screening: covered annually if you're between the age of 15-65. Also covered annually if you are younger than 15 and older than 65 with risk factors for HIV infection. For pregnant patients, it is covered up to 3 times per  pregnancy.    Immunizations:  Immunization Recommendations   Influenza Vaccine Annual influenza vaccination during flu season is recommended for all persons aged >= 6 months who do not have contraindications   Pneumococcal Vaccine   * Pneumococcal conjugate vaccine = PCV13 (Prevnar 13), PCV15 (Vaxneuvance), PCV20 (Prevnar 20)  * Pneumococcal polysaccharide vaccine = PPSV23 (Pneumovax) Adults 19-63 yo with certain risk factors or if 65+ yo  If never received any pneumonia vaccine: recommend Prevnar 20 (PCV20)  Give PCV20 if previously received 1 dose of PCV13 or PPSV23   Hepatitis B Vaccine 3 dose series if at intermediate or high risk (ex: diabetes, end stage renal disease, liver disease)   Respiratory syncytial virus (RSV) Vaccine - COVERED BY MEDICARE PART D  * RSVPreF3 (Arexvy) CDC recommends that adults 60 years of age and older may receive a single dose of RSV vaccine using shared clinical decision-making (SCDM)   Tetanus (Td) Vaccine - COST NOT COVERED BY MEDICARE PART B Following completion of primary series, a booster dose should be given every 10 years to maintain immunity against tetanus. Td may also be given as tetanus wound prophylaxis.   Tdap Vaccine - COST NOT COVERED BY MEDICARE PART B Recommended at least once for all adults. For pregnant patients, recommended with each pregnancy.   Shingles Vaccine (Shingrix) - COST NOT COVERED BY MEDICARE PART B  2 shot series recommended in those 19 years and older who have or will have weakened immune systems or those 50 years and older     Health Maintenance Due:      Topic Date Due   • Hepatitis C Screening  Never done   • Colorectal Cancer Screening  12/17/2024   • Breast Cancer Screening: Mammogram  12/22/2024   • Lung Cancer Screening  12/30/2024     Immunizations Due:      Topic Date Due   • Pneumococcal Vaccine: 65+ Years (1 of 2 - PCV) Never done   • Hepatitis A Vaccine (1 of 2 - Risk 2-dose series) Never done   • Influenza Vaccine (1) 09/01/2024   •  COVID-19 Vaccine (1 - 2024-25 season) Never done     Advance Directives   What are advance directives?  Advance directives are legal documents that state your wishes and plans for medical care. These plans are made ahead of time in case you lose your ability to make decisions for yourself. Advance directives can apply to any medical decision, such as the treatments you want, and if you want to donate organs.   What are the types of advance directives?  There are many types of advance directives, and each state has rules about how to use them. You may choose a combination of any of the following:  Living will:  This is a written record of the treatment you want. You can also choose which treatments you do not want, which to limit, and which to stop at a certain time. This includes surgery, medicine, IV fluid, and tube feedings.   Durable power of  for healthcare (DPAHC):  This is a written record that states who you want to make healthcare choices for you when you are unable to make them for yourself. This person, called a proxy, is usually a family member or a friend. You may choose more than 1 proxy.  Do not resuscitate (DNR) order:  A DNR order is used in case your heart stops beating or you stop breathing. It is a request not to have certain forms of treatment, such as CPR. A DNR order may be included in other types of advance directives.  Medical directive:  This covers the care that you want if you are in a coma, near death, or unable to make decisions for yourself. You can list the treatments you want for each condition. Treatment may include pain medicine, surgery, blood transfusions, dialysis, IV or tube feedings, and a ventilator (breathing machine).  Values history:  This document has questions about your views, beliefs, and how you feel and think about life. This information can help others choose the care that you would choose.  Why are advance directives important?  An advance directive helps  you control your care. Although spoken wishes may be used, it is better to have your wishes written down. Spoken wishes can be misunderstood, or not followed. Treatments may be given even if you do not want them. An advance directive may make it easier for your family to make difficult choices about your care.   Urinary Incontinence   Urinary incontinence (UI)  is when you lose control of your bladder. UI develops because your bladder cannot store or empty urine properly. The 3 most common types of UI are stress incontinence, urge incontinence, or both.  Medicines:   May be given to help strengthen your bladder control. Report any side effects of medication to your healthcare provider.  Do pelvic muscle exercises often:  Your pelvic muscles help you stop urinating. Squeeze these muscles tight for 5 seconds, then relax for 5 seconds. Gradually work up to squeezing for 10 seconds. Do 3 sets of 15 repetitions a day, or as directed. This will help strengthen your pelvic muscles and improve bladder control.  Train your bladder:  Go to the bathroom at set times, such as every 2 hours, even if you do not feel the urge to go. You can also try to hold your urine when you feel the urge to go. For example, hold your urine for 5 minutes when you feel the urge to go. As that becomes easier, hold your urine for 10 minutes.   Self-care:   Keep a UI record.  Write down how often you leak urine and how much you leak. Make a note of what you were doing when you leaked urine.  Drink liquids as directed. You may need to limit the amount of liquid you drink to help control your urine leakage. Do not drink any liquid right before you go to bed. Limit or do not have drinks that contain caffeine or alcohol.   Prevent constipation.  Eat a variety of high-fiber foods. Good examples are high-fiber cereals, beans, vegetables, and whole-grain breads. Walking is the best way to trigger your intestines to have a bowel movement.  Exercise  regularly and maintain a healthy weight.  Weight loss and exercise will decrease pressure on your bladder and help you control your leakage.   Use a catheter as directed  to help empty your bladder. A catheter is a tiny, plastic tube that is put into your bladder to drain your urine.   Go to behavior therapy as directed.  Behavior therapy may be used to help you learn to control your urge to urinate.    Weight Management   Why it is important to manage your weight:  Being overweight increases your risk of health conditions such as heart disease, high blood pressure, type 2 diabetes, and certain types of cancer. It can also increase your risk for osteoarthritis, sleep apnea, and other respiratory problems. Aim for a slow, steady weight loss. Even a small amount of weight loss can lower your risk of health problems.  How to lose weight safely:  A safe and healthy way to lose weight is to eat fewer calories and get regular exercise. You can lose up about 1 pound a week by decreasing the number of calories you eat by 500 calories each day.   Healthy meal plan for weight management:  A healthy meal plan includes a variety of foods, contains fewer calories, and helps you stay healthy. A healthy meal plan includes the following:  Eat whole-grain foods more often.  A healthy meal plan should contain fiber. Fiber is the part of grains, fruits, and vegetables that is not broken down by your body. Whole-grain foods are healthy and provide extra fiber in your diet. Some examples of whole-grain foods are whole-wheat breads and pastas, oatmeal, brown rice, and bulgur.  Eat a variety of vegetables every day.  Include dark, leafy greens such as spinach, kale, donal greens, and mustard greens. Eat yellow and orange vegetables such as carrots, sweet potatoes, and winter squash.   Eat a variety of fruits every day.  Choose fresh or canned fruit (canned in its own juice or light syrup) instead of juice. Fruit juice has very little or  no fiber.  Eat low-fat dairy foods.  Drink fat-free (skim) milk or 1% milk. Eat fat-free yogurt and low-fat cottage cheese. Try low-fat cheeses such as mozzarella and other reduced-fat cheeses.  Choose meat and other protein foods that are low in fat.  Choose beans or other legumes such as split peas or lentils. Choose fish, skinless poultry (chicken or turkey), or lean cuts of red meat (beef or pork). Before you cook meat or poultry, cut off any visible fat.   Use less fat and oil.  Try baking foods instead of frying them. Add less fat, such as margarine, sour cream, regular salad dressing and mayonnaise to foods. Eat fewer high-fat foods. Some examples of high-fat foods include french fries, doughnuts, ice cream, and cakes.  Eat fewer sweets.  Limit foods and drinks that are high in sugar. This includes candy, cookies, regular soda, and sweetened drinks.  Exercise:  Exercise at least 30 minutes per day on most days of the week. Some examples of exercise include walking, biking, dancing, and swimming. You can also fit in more physical activity by taking the stairs instead of the elevator or parking farther away from stores. Ask your healthcare provider about the best exercise plan for you.      © Copyright [employer] 2018 Information is for End User's use only and may not be sold, redistributed or otherwise used for commercial purposes. All illustrations and images included in CareNotes® are the copyrighted property of [employer].D[employer]M., Inc. or [employer]

## 2024-12-20 NOTE — PROGRESS NOTES
Name: Jolanta Villatoro      : 1950      MRN: [id number]  Encounter Provider: PAYAL Foreman  Encounter Date: 2024   Encounter department: [facility] PRIMARY [facility]    Assessment & Plan  Encounter for screening mammogram for breast cancer    Orders:  •  Mammo screening bilateral w 3d and cad; Future    Nicotine dependence, cigarettes, in remission    Orders:  •  CT lung screening program; Future    Medicare annual wellness visit, subsequent    Orders:  •  Lipid panel; Future  •  Comprehensive metabolic panel; Future  •  Hemoglobin A1C; Future  •  CBC and differential; Future    Pure hypertriglyceridemia    Orders:  •  Lipid panel; Future    Gastroesophageal reflux disease without esophagitis    Orders:  •  CBC and differential; Future    Prediabetes    Orders:  •  Comprehensive metabolic panel; Future  •  Hemoglobin A1C; Future    Acute right-sided low back pain without sciatica    Orders:  •  cyclobenzaprine (FLEXERIL) 10 mg tablet; Take 1 tablet (10 mg total) by mouth daily at bedtime    Lyme disease  Take antibiotics  x 4 weeks to treat lyme.          Depression Screening and Follow-up Plan: Patient was screened for depression during today's encounter. They screened negative with a PHQ-9 score of 0.    Urinary Incontinence Plan of Care: counseling topics discussed: practice Kegel (pelvic floor strengthening) exercises and use restroom every 2 hours.       Preventive health issues were discussed with patient, and age appropriate screening tests were ordered as noted in patient's After Visit Summary. Personalized health advice and appropriate referrals for health education or preventive services given if needed, as noted in patient's After Visit Summary.    History of Present Illness     Patient here for wellness visit. Was recently in the ED  for bodyaches and URI symptoms. Was found to have  a UTI.  Lyme testing I ordered was positive but was negative at [facility]. Never treated for lyme in  the past. Had 2 tick bite about 2 weeks ago.  Continues with pain all over her body and head aches.        Patient Care Team:  PAYAL Foreman as PCP - General (Family Medicine)  Tiago Chahal, DO as PCP - PCP-[facility] ([facility])  Tiago Chahal,  as PCP - PCP-[facility] ([facility])  Caleb Diggs MD (Urology)    Review of Systems   Constitutional:  Positive for fatigue. Negative for chills.   Respiratory:  Positive for cough. Negative for chest tightness, shortness of breath and wheezing.    Cardiovascular: Negative.  Negative for chest pain and palpitations.   Gastrointestinal: Negative.  Negative for abdominal pain, constipation and diarrhea.   Musculoskeletal:  Positive for arthralgias and myalgias.   Skin: Negative.  Negative for rash.   Neurological:  Positive for headaches.   Psychiatric/Behavioral: Negative.  Negative for decreased concentration and dysphoric mood. The patient is not nervous/anxious.      Medical History Reviewed by provider this encounter:       Annual Wellness Visit Questionnaire   Jolanta is here for her Subsequent Wellness visit. Last Medicare Wellness visit information reviewed, patient interviewed, no change since last AWV.     Health Risk Assessment:   Patient rates overall health as fair. Patient feels that their physical health rating is slightly worse. Patient is satisfied with their life. Eyesight was rated as same. Hearing was rated as same. Patient feels that their emotional and mental health rating is same. Patients states they are never, rarely angry. Patient states they are often unusually tired/fatigued. Pain experienced in the last 7 days has been a lot. Patient's pain rating has been 9/10. Patient states that she has experienced no weight loss or gain in last 6 months. Joint pain all over.     Depression Screening:   PHQ-9 Score: 0      Fall Risk Screening:   In the past year, patient has experienced: no history of falling in past year      Urinary  Incontinence Screening:   Patient has leaked urine accidently in the last six months.     Home Safety:  Patient does not have trouble with stairs inside or outside of their home. Patient has working smoke alarms and has working carbon monoxide detector. Home safety hazards include: none.     Nutrition:   Current diet is Regular.     Medications:   Patient is currently taking over-the-counter supplements. OTC medications include: see medication list. Patient is able to manage medications.     Activities of Daily Living (ADLs)/Instrumental Activities of Daily Living (IADLs):   Walk and transfer into and out of bed and chair?: Yes  Dress and groom yourself?: Yes    Bathe or shower yourself?: Yes    Feed yourself? Yes  Do your laundry/housekeeping?: Yes  Manage your money, pay your bills and track your expenses?: Yes  Make your own meals?: Yes    Do your own shopping?: Yes    Previous Hospitalizations:   Any hospitalizations or ED visits within the last 12 months?: Yes    How many hospitalizations have you had in the last year?: 1-2    Hospitalization Comments: ER for Lyme    Advance Care Planning:   Living will: No    Durable POA for healthcare: No    Advanced directive: No    Five wishes given: No    Patient declined ACP directive: Yes    End of Life Decisions reviewed with patient: Yes      PREVENTIVE SCREENINGS      Cardiovascular Screening:    General: History Lipid Disorder and Screening Current      Diabetes Screening:     General: Screening Current      Colorectal Cancer Screening:     General: Screening Current      Breast Cancer Screening:     General: Screening Current      Cervical Cancer Screening:    General: Screening Not Indicated      Lung Cancer Screening:     General: Screening Current    Screening, Brief Intervention, and Referral to Treatment (SBIRT)    Screening  Typical number of drinks in a day: 0  Typical number of drinks in a week: 0  Interpretation: Low risk drinking behavior.    Single Item  "Drug Screening:  How often have you used an illegal drug (including marijuana) or a prescription medication for non-medical reasons in the past year? never    Single Item Drug Screen Score: 0  Interpretation: Negative screen for possible drug use disorder    Other Counseling Topics:   Car/seat belt/driving safety and calcium and vitamin D intake and regular weightbearing exercise.     Social Drivers of Health     Financial Resource Strain: Low Risk  (12/19/2023)    Overall Financial Resource Strain (CARDIA)    • Difficulty of Paying Living Expenses: Not hard at all   Food Insecurity: No Food Insecurity (12/20/2024)    Hunger Vital Sign    • Worried About Running Out of Food in the Last Year: Never true    • Ran Out of Food in the Last Year: Never true   Transportation Needs: No Transportation Needs (12/20/2024)    PRAPARE - Transportation    • Lack of Transportation (Medical): No    • Lack of Transportation (Non-Medical): No   Housing Stability: Low Risk  (12/20/2024)    Housing Stability Vital Sign    • Unable to Pay for Housing in the Last Year: No    • Number of Times Moved in the Last Year: 1    • Homeless in the Last Year: No   Utilities: Not At Risk (12/20/2024)    [facility] Utilities    • Threatened with loss of utilities: No     No results found.    Objective   /68   Pulse 92   Temp 98.3 °F (36.8 °C)   Ht 5' 3\" (1.6 m)   Wt 74.4 kg (164 lb)   SpO2 94%   BMI 29.05 kg/m²     Physical Exam  Vitals and nursing note reviewed.   Constitutional:       General: She is not in acute distress.     Appearance: Normal appearance. She is well-developed. She is not diaphoretic.   HENT:      Head: Normocephalic and atraumatic.   Cardiovascular:      Rate and Rhythm: Normal rate and regular rhythm.      Heart sounds: No murmur heard.  Pulmonary:      Effort: Pulmonary effort is normal. No tachypnea or respiratory distress.      Breath sounds: Normal breath sounds.   Skin:     General: Skin is warm.      Findings: No " rash.   Neurological:      General: No focal deficit present.      Mental Status: She is alert and oriented to person, place, and time.   Psychiatric:         Speech: Speech normal.         Behavior: Behavior normal. Behavior is cooperative.         Thought Content: Thought content normal.         Judgment: Judgment normal.

## 2024-12-23 ENCOUNTER — TELEPHONE (OUTPATIENT)
Dept: ADMINISTRATIVE | Facility: OTHER | Age: 74
End: 2024-12-23

## 2024-12-23 NOTE — TELEPHONE ENCOUNTER
----- Message from Jacqueline TANNER sent at 12/20/2024 10:13 AM EST -----  Regarding: iFit  12/20/24 10:14 AM    Hello, our patient Jolanta Villatoro has had CRC: FIT/FOBTx3 completed/performed. Please assist in updating the patient chart by pulling the Care Everywhere (CE) document. The date of service is 9/24/24.     Thank you,  Jacqueline Victoria MA  [facility]

## 2024-12-23 NOTE — TELEPHONE ENCOUNTER
Upon review of the In Basket request we were able to locate, review, and update the patient chart as requested for CRC: FIT/FOBT (3).    Any additional questions or concerns should be emailed to the Practice Liaisons via the appropriate education email address, please do not reply via In Basket.    Thank you  Elisabet Lugo MA   [facility]

## 2025-01-03 ENCOUNTER — HOSPITAL ENCOUNTER (OUTPATIENT)
Dept: MAMMOGRAPHY | Facility: HOSPITAL | Age: 75
End: 2025-01-03
Payer: COMMERCIAL

## 2025-01-03 VITALS — HEIGHT: 63 IN | BODY MASS INDEX: 29.06 KG/M2 | WEIGHT: 164 LBS

## 2025-01-03 DIAGNOSIS — I63.9 STROKE (HCC): ICD-10-CM

## 2025-01-03 DIAGNOSIS — Z12.31 ENCOUNTER FOR SCREENING MAMMOGRAM FOR BREAST CANCER: ICD-10-CM

## 2025-01-03 PROCEDURE — 77067 SCR MAMMO BI INCL CAD: CPT

## 2025-01-03 PROCEDURE — 77063 BREAST TOMOSYNTHESIS BI: CPT

## 2025-01-03 RX ORDER — RIBOFLAVIN (VITAMIN B2) 400 MG
1 TABLET ORAL 2 TIMES DAILY
Qty: 180 TABLET | Refills: 1 | Status: SHIPPED | OUTPATIENT
Start: 2025-01-03

## 2025-01-06 ENCOUNTER — RESULTS FOLLOW-UP (OUTPATIENT)
Dept: FAMILY MEDICINE CLINIC | Facility: CLINIC | Age: 75
End: 2025-01-06

## 2025-01-13 ENCOUNTER — TELEPHONE (OUTPATIENT)
Age: 75
End: 2025-01-13

## 2025-01-13 NOTE — TELEPHONE ENCOUNTER
Patient confirmed reschedule June appointment.    Questioning directions on Doxycycline Hyclate 100 mg.    Medication sig is 1 tablet by mouth BID, and was given a 320 day supply.    Patient has hand written note from provider stating she should only take for 7 days.    Please call back for clarification.    If you receive her answering machine, please leave a detailed message.

## 2025-01-14 NOTE — TELEPHONE ENCOUNTER
I prescribed a 30 days supply. She is supposed to take this for 4 weeks. 2 times a day fro 30 days and 60 tablets were to be dispensed. If the pharmacy gave her more, then this is incorrect.

## 2025-01-14 NOTE — TELEPHONE ENCOUNTER
It is possible that the medication could be causing these symptoms or could be worsening symptoms as this is also common in the winter to have dry skin. Make sure to drink plenty of fluids and use lotion 1-2 times a day.

## 2025-01-14 NOTE — TELEPHONE ENCOUNTER
Spoke with patient and informed of PCP message. She expressed understanding. Patient wanted to know if the medication could be causing her to have dry skin, lips, hands, etc... as she has noticed this a lot since starting.

## 2025-01-29 ENCOUNTER — OFFICE VISIT (OUTPATIENT)
Dept: UROLOGY | Facility: CLINIC | Age: 75
End: 2025-01-29
Payer: COMMERCIAL

## 2025-01-29 VITALS
SYSTOLIC BLOOD PRESSURE: 146 MMHG | OXYGEN SATURATION: 98 % | WEIGHT: 169 LBS | HEIGHT: 63 IN | HEART RATE: 70 BPM | BODY MASS INDEX: 29.95 KG/M2 | DIASTOLIC BLOOD PRESSURE: 88 MMHG | TEMPERATURE: 98.2 F

## 2025-01-29 DIAGNOSIS — R39.9 UTI SYMPTOMS: Primary | ICD-10-CM

## 2025-01-29 DIAGNOSIS — N39.0 RECURRENT UTI: ICD-10-CM

## 2025-01-29 LAB
SL AMB  POCT GLUCOSE, UA: NORMAL
SL AMB LEUKOCYTE ESTERASE,UA: NORMAL
SL AMB POCT BILIRUBIN,UA: NORMAL
SL AMB POCT BLOOD,UA: NORMAL
SL AMB POCT CLARITY,UA: CLEAR
SL AMB POCT COLOR,UA: YELLOW
SL AMB POCT KETONES,UA: NORMAL
SL AMB POCT NITRITE,UA: NORMAL
SL AMB POCT PH,UA: 6
SL AMB POCT SPECIFIC GRAVITY,UA: 1.02
SL AMB POCT URINE PROTEIN: NORMAL
SL AMB POCT UROBILINOGEN: 0.2

## 2025-01-29 PROCEDURE — 81002 URINALYSIS NONAUTO W/O SCOPE: CPT | Performed by: UROLOGY

## 2025-01-29 PROCEDURE — 99214 OFFICE O/P EST MOD 30 MIN: CPT | Performed by: UROLOGY

## 2025-01-29 RX ORDER — DOXYCYCLINE 150 MG/1
150 TABLET ORAL 2 TIMES DAILY
COMMUNITY

## 2025-01-29 RX ORDER — METHENAMINE HIPPURATE 1000 MG/1
1 TABLET ORAL 2 TIMES DAILY WITH MEALS
Qty: 180 TABLET | Refills: 3 | Status: SHIPPED | OUTPATIENT
Start: 2025-01-29

## 2025-01-29 RX ORDER — NITROFURANTOIN MACROCRYSTALS 100 MG/1
100 CAPSULE ORAL DAILY
Qty: 90 CAPSULE | Refills: 1 | Status: SHIPPED | OUTPATIENT
Start: 2025-01-29

## 2025-01-29 NOTE — PROGRESS NOTES
UROLOGY PROGRESS NOTE   [facility]  [address] [place] [address] [address]  [address]  [place] [address]  [phone]  Fax:[phone]  [email or web address]      NAME: Jolanta Villatoro  AGE: 74 y.o. SEX: female  : 1950   MRN: [id number]    DATE: 2025  TIME: 1:47 PM    Assessment and Plan:    Recurrent UTI: At least 4 documented UTIs in the past year, and she says more.  Current urinalysis is negative and her symptoms are resolving spontaneously.  Because of this I am going to start her on 100 mg of nitrofurantoin daily for prophylaxis for total of 6 months.  Possible risks of pulmonary fibrosis and bowel complications were explained and she wishes to proceed.  We will also start her on methenamine 1000 mg p.o. twice daily and d-mannose.    Stress incontinence is not volitional incontinence-unfortunately she will have to live with this.  As below.    Follow-up in 6 months for reevaluation.               Chief Complaint   No chief complaint on file.      History of Present Illness   History of gross hematuria, last seen by me 2024 with negative workup-    Stress incontinence: Discouraged surgery and offered PFPT.  She continues with this and I reiterated treatment for stress incontinence-in her case Kegel exercises.  Not a good surgical candidate because of her history of stroke and feeling of incomplete bladder emptying.  She would be at high risk of retention.  She also has nondelusional incontinence.    Recurrent UTIs: Not a candidate for topical estrogen due to breast cancer in her sister..  Has 8 allergies to antibiotics.  Plan was to treat the infections as they arose, and methenamine if it became more frequent.  However she does have sensitivities to Macrodantin and she is now allergic to it.    Culture history- All symptomatic, with burning and pressure.    Had an E. coli UTI 2024 which was treated with Macrobid.    She had another positive urine culture 50-59,000 colonies  "of E. coli pansensitive September 27, 2024.    August 7, 2024 she had greater than 100,000 colonies of Proteus, less than 10,000 colonies of E. coli and less than 10,000 colonies of strep agalactia treated with antibiotics by her PCP.    Negative urine culture April 24, 2024.    Greater than 1000 colonies of E. coli pansensitive December 26, 2023.    She recently had a couple day episode of burning in her bladder with bubbly urine, and this is now easing up spontaneously.  She has been on a month of antibiotics for Lyme disease.      The following portions of the patient's history were reviewed and updated as appropriate: allergies, current medications, past family history, past medical history, past social history, past surgical history and problem list.  Past Medical History:   Diagnosis Date    Cancer (HCC)     gallbladder    COVID-19     GERD (gastroesophageal reflux disease)     Glaucoma     History of gallbladder cancer     Approx. 3 years ago    Hyperlipemia     Lightheadedness 5/3/2021    Murmur     Rheumatic fever     RUQ abdominal pain 5/3/2021     Past Surgical History:   Procedure Laterality Date    CHOLECYSTECTOMY      TONSILLECTOMY  CHILDHOOD       Review of Systems   Review of Systems   Genitourinary:         As per HPI       Active Problem List     Patient Active Problem List   Diagnosis    Depression, recurrent (HCC)    Seasonal allergies    Primary gall bladder adenocarcinoma (HCC)    Pure hypertriglyceridemia    Diverticulosis    Murmur    SOLORZANO (dyspnea on exertion)    COPD, group A, by GOLD 2017 classification ([facility])    History of CVA with residual deficit    GERD (gastroesophageal reflux disease)    Glaucoma    Headache       Objective   /88   Pulse 70   Temp 98.2 °F (36.8 °C)   Ht 5' 3\" (1.6 m)   Wt 76.7 kg (169 lb)   SpO2 98%   BMI 29.94 kg/m²     Physical Exam  Vitals reviewed.   Constitutional:       Appearance: Normal appearance. She is normal weight.   HENT:      Head: " Normocephalic and atraumatic.   Eyes:      Extraocular Movements: Extraocular movements intact.   Pulmonary:      Effort: Pulmonary effort is normal.   Musculoskeletal:         General: Normal range of motion.      Cervical back: Normal range of motion.   Skin:     Coloration: Skin is not jaundiced or pale.   Neurological:      General: No focal deficit present.      Mental Status: She is alert and oriented to person, place, and time. Mental status is at baseline.   Psychiatric:         Mood and Affect: Mood normal.         Behavior: Behavior normal.         Thought Content: Thought content normal.         Judgment: Judgment normal.             Current Medications     Current Outpatient Medications:     acyclovir (ZOVIRAX) 5 % ointment, Apply 1 application. topically daily as needed As directed, Disp: , Rfl:     albuterol (PROVENTIL HFA,VENTOLIN HFA) 90 mcg/act inhaler, Inhale 2 puffs every 6 (six) hours as needed for wheezing or shortness of breath, Disp: 18 g, Rfl: 0    aspirin (ECOTRIN LOW STRENGTH) 81 mg EC tablet, Take 1 tablet (81 mg total) by mouth daily, Disp: 90 tablet, Rfl: 0    azelastine (ASTELIN) 0.1 % nasal spray, 1 spray into each nostril 2 (two) times a day Use in each nostril as directed, Disp: 30 mL, Rfl: 3    cyclobenzaprine (FLEXERIL) 10 mg tablet, Take 1 tablet (10 mg total) by mouth daily at bedtime, Disp: 30 tablet, Rfl: 0    doxycycline (ADOXA) 150 MG tablet, Take 150 mg by mouth 2 (two) times a day, Disp: , Rfl:     Fluticasone-Salmeterol (Advair) 100-50 mcg/dose inhaler, Inhale 1 puff 2 (two) times a day Rinse mouth after use., Disp: 60 blister, Rfl: 5    ipratropium (ATROVENT) 0.03 % nasal spray, 2 sprays into each nostril 3 (three) times a day as needed for rhinitis, Disp: 30 mL, Rfl: 3    latanoprost (XALATAN) 0.005 % ophthalmic solution, Apply to eye, Disp: , Rfl:     magnesium Oxide (MAG-OX) 400 mg TABS, TAKE ONE TABLET BY MOUTH TWO TIMES A DAY, Disp: 60 tablet, Rfl: 5    meclizine  (ANTIVERT) 25 mg tablet, Take 1 tablet (25 mg total) by mouth 3 (three) times a day as needed for dizziness, Disp: 30 tablet, Rfl: 0    omeprazole (PriLOSEC) 40 MG capsule, TAKE ONE CAPSULE (40 MG TOTAL) BY MOUTH DAILY, Disp: 90 capsule, Rfl: 1    phenazopyridine (PYRIDIUM) 100 mg tablet, Take 1 tablet (100 mg total) by mouth 3 (three) times a day as needed for bladder spasms, Disp: 10 tablet, Rfl: 0    Riboflavin 400 MG TABS, Take 1 Tablet by mouth in the morning and 1 Tablet before bedtime., Disp: 180 tablet, Rfl: 1    Spiriva Respimat 2.5 MCG/ACT AERS inhaler, Inhale 2 puffs daily, Disp: 4 g, Rfl: 2    sucralfate (CARAFATE) 1 g tablet, take 1 tablet by mouth three times a day, Disp: 90 tablet, Rfl: 0    triamcinolone (KENALOG) 0.1 % cream, Apply topically 2 (two) times a day, Disp: 28 g, Rfl: 0    I have spent a total time of 25 minutes in caring for this patient on the day of the visit/encounter including Diagnostic results, Risks and benefits of tx options, Instructions for management, Impressions, Counseling / Coordination of care, Documenting in the medical record, Reviewing / ordering tests, medicine, procedures  , and Obtaining or reviewing history  .     Caleb Diggs MD

## 2025-01-29 NOTE — PATIENT INSTRUCTIONS
Take d-mannose daily for prevention of UTIs-over-the-counter at your drugstore, follow the dosing on the package.    Try doing Kegel exercises-this is where you throughout the day periodically squeeze your pelvic floor muscles together as if you are trying to stop urination.  This will strengthen your pelvic floor.  You are not a good surgical candidate due to high risk of urinary retention with your previous stroke and feeling of incomplete bladder emptying.    Also start taking the methenamine 1 g twice daily to prevent infections-make sure you take at least 500 mg of vitamin C per day.

## 2025-02-03 ENCOUNTER — NURSE TRIAGE (OUTPATIENT)
Age: 75
End: 2025-02-03

## 2025-02-03 DIAGNOSIS — R39.9 UTI SYMPTOMS: Primary | ICD-10-CM

## 2025-02-03 NOTE — TELEPHONE ENCOUNTER
Patient of Dr. Diggs last seen in the [place] office called in with concerns of dysuria, urgency, frequency and having difficulty urinating. Denies any fevers or vomiting. Patient reports going small amounts at a time. Offered to schedule an office appointment for a PVR but patient declined. Reports wanting to give it a few more days. Advised to increase water intake and avoid bladder irritants. ED precautions reviewed. Placed urine orders to rule out infection. Patient states she did not start medications that were prescribed by Dr. Diggs on 1/29. Advised to give urine sample and start medications as prescribed. Patient verbalized understanding.       Reason for Disposition   The patient has difficulty urinating and does not feel bladder is empty    Answer Assessment - Initial Assessment Questions  1. When did your symptoms start?   4 days ago   2. Do you experience pain or burning with every void?   No   3. Do you have any other urinary symptoms such as urinary frequency, urgency, incontinence, blood in your urine?   Urgency, frequency   4. Do you have any abdominal pain or flank pain?  No  5. Do you have a fever of 101 or higher? How did you take your temperature?   No  7. Have you become unable to urinate?   No  8. Do you have a history of urinary tract infections?   Yes    Protocols used: Urology-Dysuria-ADULT-[place]

## 2025-02-03 NOTE — TELEPHONE ENCOUNTER
Valeria from [facility] in [place] called asking if we could fax the patients script to [phone]. Faxed the UA and culture as requested.

## 2025-02-03 NOTE — TELEPHONE ENCOUNTER
Agree with urine testing, and we will monitor those results.  She was recently started on daily prophylactic Macrobid so I do not wish to start empiric therapy without culture results.

## 2025-02-03 NOTE — TELEPHONE ENCOUNTER
Valeria from [facility] in [place] called as they have not gotten scripts for UA and culture. Asking if we could re-fax the patients script to [phone] and to [phone]. Faxed the UA and culture as requested.

## 2025-02-04 ENCOUNTER — RESULTS FOLLOW-UP (OUTPATIENT)
Dept: UROLOGY | Facility: CLINIC | Age: 75
End: 2025-02-04

## 2025-02-04 LAB
AMORPH URATE CRY URNS QL MICRO: PRESENT
BACTERIA URNS QL MICRO: ABNORMAL
GLUCOSE UR QL STRIP: NEGATIVE MG/DL
HGB UR QL STRIP: NEGATIVE MG/DL
KETONES UR QL STRIP: NEGATIVE MG/DL
LEUKOCYTE ESTERASE UR QL STRIP: 500 /UL
MUCOUS THREADS URNS QL MICRO: ABNORMAL
NITRITE UR QL STRIP: POSITIVE
PH UR: 8 [PH] (ref 4.5–8)
PROT 24H UR-MRATE: ABNORMAL MG/DL
RBC #/AREA URNS HPF: ABNORMAL /HPF (ref 0–2)
SL AMB POCT URINE COMMENT: ABNORMAL
SP GR UR: 1.01 (ref 1–1.03)
WBC #/AREA URNS HPF: ABNORMAL /HPF (ref 0–5)

## 2025-02-04 NOTE — TELEPHONE ENCOUNTER
Patient called for the results of her urine test. I read her Britt note and she will start the Macrobid and wait for a call back once we get the results of the UC.

## 2025-02-05 ENCOUNTER — RESULTS FOLLOW-UP (OUTPATIENT)
Dept: UROLOGY | Facility: CLINIC | Age: 75
End: 2025-02-05

## 2025-02-05 DIAGNOSIS — N39.0 RECURRENT UTI: Primary | ICD-10-CM

## 2025-02-05 LAB — LEGIONELLA SPEC CULT: NORMAL

## 2025-02-05 RX ORDER — AMOXICILLIN 500 MG/1
500 CAPSULE ORAL EVERY 8 HOURS SCHEDULED
Qty: 21 CAPSULE | Refills: 0 | Status: SHIPPED | OUTPATIENT
Start: 2025-02-05 | End: 2025-02-12

## 2025-02-05 RX ORDER — AMOXICILLIN 500 MG/1
500 CAPSULE ORAL EVERY 8 HOURS SCHEDULED
Qty: 21 CAPSULE | Refills: 0 | Status: SHIPPED | OUTPATIENT
Start: 2025-02-05 | End: 2025-02-05 | Stop reason: SDUPTHER

## 2025-02-05 NOTE — RESULT ENCOUNTER NOTE
Please let patient know that her urine culture is positive.  I sent a prescription for amoxicillin to her pharmacy.  She should hold Macrodantin and methenamine while taking.

## 2025-02-05 NOTE — TELEPHONE ENCOUNTER
----- Message from Lisandra Mckeon MA sent at 2/5/2025  1:14 PM EST -----  Patient is requesting that the Amoxicillin is sent to:  [facility]. [address] [place] [address]

## 2025-02-05 NOTE — TELEPHONE ENCOUNTER
Called patient and informed her that her urine culture came back positive for infection. Informed her that Amoxicillin was sent to the [facility] in [place]. Also informed the pt that she should hold Macrodantin and methenamine while taking this antibiotic. Patient confirmed understanding.     Patient is requesting that the amoxicillin is sent to the [facility]  [address] [place] [address]. [phone]   Informed the pt that I would send a message to the provider.

## 2025-02-05 NOTE — TELEPHONE ENCOUNTER
----- Message from PAYAL Catalan sent at 2/5/2025  7:57 AM EST -----  Please let patient know that her urine culture is positive.  I sent a prescription for amoxicillin to her pharmacy.  She should hold Macrodantin and methenamine while taking.

## 2025-02-20 ENCOUNTER — TELEPHONE (OUTPATIENT)
Age: 75
End: 2025-02-20

## 2025-02-20 DIAGNOSIS — Z12.11 SCREENING FOR COLON CANCER: Primary | ICD-10-CM

## 2025-02-20 NOTE — TELEPHONE ENCOUNTER
Patient called in requesting an order be placed for a colon screening. Patient got a letter stating she is due for one and is requesting for the at home kit to be ordered to her house.Please advise.

## 2025-03-01 LAB — COLOGUARD RESULT REPORTABLE: NEGATIVE

## 2025-03-06 ENCOUNTER — HOSPITAL ENCOUNTER (OUTPATIENT)
Dept: CT IMAGING | Facility: HOSPITAL | Age: 75
Discharge: HOME | End: 2025-03-06

## 2025-03-06 DIAGNOSIS — F17.211 NICOTINE DEPENDENCE, CIGARETTES, IN REMISSION: ICD-10-CM

## 2025-03-07 DIAGNOSIS — K21.9 GASTROESOPHAGEAL REFLUX DISEASE WITHOUT ESOPHAGITIS: ICD-10-CM

## 2025-03-07 RX ORDER — OMEPRAZOLE 40 MG/1
CAPSULE, DELAYED RELEASE ORAL
Qty: 90 CAPSULE | Refills: 1 | Status: SHIPPED | OUTPATIENT
Start: 2025-03-07

## 2025-04-07 ENCOUNTER — TELEPHONE (OUTPATIENT)
Age: 75
End: 2025-04-07

## 2025-04-07 DIAGNOSIS — R30.0 BURNING WITH URINATION: ICD-10-CM

## 2025-04-07 DIAGNOSIS — R30.0 BURNING WITH URINATION: Primary | ICD-10-CM

## 2025-04-07 LAB
BACTERIA UR QL AUTO: ABNORMAL /HPF
BILIRUB UR QL STRIP: NEGATIVE
CLARITY UR: ABNORMAL
COLOR UR: YELLOW
GLUCOSE UR STRIP-MCNC: NEGATIVE MG/DL
HGB UR QL STRIP.AUTO: ABNORMAL
KETONES UR STRIP-MCNC: NEGATIVE MG/DL
LEUKOCYTE ESTERASE UR QL STRIP: ABNORMAL
NITRITE UR QL STRIP: NEGATIVE
NON-SQ EPI CELLS URNS QL MICRO: ABNORMAL /HPF
PH UR STRIP.AUTO: 7 [PH]
PROT UR STRIP-MCNC: ABNORMAL MG/DL
RBC #/AREA URNS AUTO: ABNORMAL /HPF
SP GR UR STRIP.AUTO: 1.02 (ref 1–1.03)
UROBILINOGEN UR STRIP-ACNC: <2 MG/DL
WBC #/AREA URNS AUTO: ABNORMAL /HPF

## 2025-04-07 PROCEDURE — 87186 SC STD MICRODIL/AGAR DIL: CPT | Performed by: NURSE PRACTITIONER

## 2025-04-07 PROCEDURE — 81001 URINALYSIS AUTO W/SCOPE: CPT

## 2025-04-07 PROCEDURE — 87077 CULTURE AEROBIC IDENTIFY: CPT | Performed by: NURSE PRACTITIONER

## 2025-04-07 PROCEDURE — 87086 URINE CULTURE/COLONY COUNT: CPT | Performed by: NURSE PRACTITIONER

## 2025-04-07 NOTE — TELEPHONE ENCOUNTER
UA and culture ordered for pt.     Asking for cologuard/CT Lung screening results. Has not heard anything from us.

## 2025-04-07 NOTE — TELEPHONE ENCOUNTER
Patient called in, said if anything is going to be sent, if its before 6pm, please send to:     [facility] - [place] - [address] [facility] Phone: [phone]   Fax: [phone]           After 6pm, please send to:    [id number] [place] - [address] Phone: [phone]   Fax: [phone]          Thank you!     [place]: [phone]

## 2025-04-07 NOTE — TELEPHONE ENCOUNTER
Phone call from patient stating she started last Friday night with UTI symptoms and they are now worse. Patient has increased frequency, very painful urination and low back pain. Used urinary pain relief something similar to AZO with no relief. Patient asking to get a UA done and something to help the symptoms. Please advise and call patient.

## 2025-04-08 DIAGNOSIS — N39.0 RECURRENT UTI: ICD-10-CM

## 2025-04-08 RX ORDER — NITROFURANTOIN MACROCRYSTALS 100 MG/1
100 CAPSULE ORAL DAILY
Qty: 90 CAPSULE | Refills: 1 | Status: SHIPPED | OUTPATIENT
Start: 2025-04-08

## 2025-04-08 NOTE — TELEPHONE ENCOUNTER
You have been following this patient for her UTI's.  She call the office yesterday. Her urinalysis is back but not her Culture. You had put her on amoxicillin with her last UTI. Would you recommend the amoxicillin again and holding the methenamine and nitrofurantoin?

## 2025-04-08 NOTE — TELEPHONE ENCOUNTER
Patient called asking if the provider can review the urine analysis that did come back and call her back with the results

## 2025-04-08 NOTE — TELEPHONE ENCOUNTER
I would recommend waiting on final culture, although positive for pyuria there is no evidence of bacteria.  I would like to see what the culture shows first.

## 2025-04-08 NOTE — TELEPHONE ENCOUNTER
Thanks for you input. Ill have my office call her and let her know we are waiting for the final culture result.

## 2025-04-09 ENCOUNTER — TELEPHONE (OUTPATIENT)
Age: 75
End: 2025-04-09

## 2025-04-10 DIAGNOSIS — N39.0 FREQUENT UTI: Primary | ICD-10-CM

## 2025-04-10 RX ORDER — AMOXICILLIN 500 MG/1
500 CAPSULE ORAL EVERY 8 HOURS SCHEDULED
Qty: 30 CAPSULE | Refills: 0 | Status: SHIPPED | OUTPATIENT
Start: 2025-04-10 | End: 2025-04-20

## 2025-04-10 NOTE — TELEPHONE ENCOUNTER
I sent in the amoxicillin for her, she should hold the macrobid and methenamine while on amoxicillin

## 2025-04-10 NOTE — TELEPHONE ENCOUNTER
Patient is still waiting for results.  She says that she is still in quite a bit of pain.  Please contact patient and advise.  Thank you.

## 2025-04-11 LAB
BACTERIA UR CULT: ABNORMAL
BACTERIA UR CULT: ABNORMAL

## 2025-04-14 DIAGNOSIS — R25.2 MUSCLE CRAMPS: ICD-10-CM

## 2025-04-15 RX ORDER — LANOLIN ALCOHOL/MO/W.PET/CERES
400 CREAM (GRAM) TOPICAL 2 TIMES DAILY
Qty: 60 TABLET | Refills: 0 | Status: SHIPPED | OUTPATIENT
Start: 2025-04-15

## 2025-04-15 NOTE — TELEPHONE ENCOUNTER
Patient needs updated blood work. Please place orders. A courtesy refill was provided.     Pt needs update mag level

## 2025-05-10 DIAGNOSIS — R25.2 MUSCLE CRAMPS: ICD-10-CM

## 2025-05-13 RX ORDER — LANOLIN ALCOHOL/MO/W.PET/CERES
400 CREAM (GRAM) TOPICAL 2 TIMES DAILY
Qty: 60 TABLET | Refills: 0 | Status: SHIPPED | OUTPATIENT
Start: 2025-05-13

## 2025-05-22 NOTE — PROGRESS NOTES
[address] [facility]        NAME: Michaela Tolbert is a 72 y o  female  : 1950    MRN: [id number]  DATE: 2020  TIME: 5:01 PM    Assessment and Plan   Cramping of hands [R25 2]  1  Cramping of hands  Comprehensive metabolic panel    Magnesium   2  Other fatigue  Vitamin D 25 hydroxy    Lyme Antibody Profile with reflex to WB    CBC and differential    Vitamin B12    TSH, 3rd generation with Free T4 reflex   3  Dizziness  CBC and differential    Iron Panel (Includes Ferritin, Iron Sat%, Iron, and TIBC)    TSH, 3rd generation with Free T4 reflex   4  Urinary frequency  POCT urine dip    Urine culture    Urine culture   5  Urinary tract infection with hematuria, site unspecified  nitrofurantoin (MACROBID) 100 mg capsule   6  SOB (shortness of breath)     7  Pounding heartbeat     8  Pain in both upper extremities       Results for orders placed or performed in visit on 20   POCT urine dip   Result Value Ref Range    LEUKOCYTE ESTERASE,UA moderate     NITRITE,UA negative     SL AMB POCT UROBILINOGEN 0 2     POCT URINE PROTEIN negative      PH,UA 5 5     BLOOD,UA large     SPECIFIC GRAVITY,UA 1 015     KETONES,UA negative     BILIRUBIN,UA nefative     GLUCOSE, UA negative      COLOR,UA yellow     CLARITY,UA clear          If labs are normal with consider ECHO and EKG  because of the palpitations/feels like her heart is pounding she is having  Patient left before I was able to review urine results with her  Patient is positive for UTI, antibiotic Macrobid sent to the pharmacy for the patient  Patient was called by [facility] with results       Patient has multiple nonspecific complaints today  Will order lab work to try to direct workup  Vitals are normal and stable  Will consider other testing if labs are negative  Unable to give full d/c instructions as patient said she was finished after giving urine sample  Will need follow up visit to discuss labs     Patient Consult     TIANA Cr 3.5, hyponatremia    Who:Yuval  Date:5/22/2025,  Time:7:09 AM    Electronically signed by Mena Elias on 5/22/25 at 7:09 AM EDT     Instructions     Patient Instructions   Get labs done,   Will call with results  Contact ENT and make aware you are interested in further workup for the dizziness and would now like the MRI done  Does not want Neurology referral at this time  Chief Complaint     Chief Complaint   Patient presents with    Fatigue    Headache    Dizziness     standing, when looks up she get dizzy    Shortness of Breath    Hand Pain     muscles tense up in hands unable to move them when this happens         History of Present Illness        Patient here with multiple complaints  Reports pressure, burning with urination x starting today  Last UTI was over a year ago  Denies any change in activities  Denies fever chills, sweats, back pain, vaginal symptoms  Reports multiple symptoms ongoing for the last few months, have started to worsen  Symptoms include SOB intermitently, hand cramping not everyday but does happen, reports 3 days ago it happened in the shower and lasted for a few min, was not able to dry her self off, dizziness used to be just when she was looking down but now is when she looks up as well, feels like heart is pounding at times but not all the time  Pain her arms the goes across the chest not associated with any activity, fatigue all the time  Wants to know if any of these symptoms could be her heart  Review of Systems   Review of Systems   Constitutional: Positive for fatigue  Negative for activity change, appetite change, chills and fever  HENT: Negative for congestion, ear pain, nosebleeds, rhinorrhea and sore throat  Eyes: Negative for photophobia, pain, redness and visual disturbance  Respiratory: Positive for shortness of breath  Negative for cough and wheezing  Cardiovascular: Negative  Negative for chest pain  Heart pounding   Gastrointestinal: Negative  Negative for abdominal pain, constipation, diarrhea and vomiting  Endocrine: Negative  Genitourinary: Negative for difficulty urinating, dysuria and flank pain  Musculoskeletal: Negative  B/l hand cramping, b/l arm pain radiating across chest     Skin: Negative for color change and rash  Neurological: Positive for dizziness and light-headedness  Negative for weakness, numbness and headaches  Hematological: Negative for adenopathy  Psychiatric/Behavioral: Negative for agitation and confusion  The patient is not nervous/anxious  PHQ-9 Depression Screening    PHQ-9:    Frequency of the following problems over the past two weeks:       Little interest or pleasure in doing things:  3 - nearly every day  Feeling down, depressed, or hopeless:  3 - nearly every day  Trouble falling or staying asleep, or sleeping too much:  3 - nearly every day  Feeling tired or having little energy:  3 - nearly every day  Poor appetite or overeatin - not at all  Feeling bad about yourself - or that you are a failure or have let yourself or your family down:  1 - several days  Trouble concentrating on things, such as reading the newspaper or watching television:  0 - not at all  Moving or speaking so slowly that other people could have noticed  Or the opposite - being so fidgety or restless that you have been moving around a lot more than usual:  0 - not at all  Thoughts that you would be better off dead, or of hurting yourself in some way:  0 - not at all  PHQ-2 Score:  6  PHQ-9 Score:  13        Current Medications       Current Outpatient Medications:     acetaminophen (TYLENOL) 325 mg tablet, Take by mouth, Disp: , Rfl:     atorvastatin (LIPITOR) 20 mg tablet, Take 1 tablet (20 mg total) by mouth daily, Disp: 90 tablet, Rfl: 1    omeprazole (PriLOSEC) 20 mg delayed release capsule, TAKE  (1)  CAPSULE  TWICE DAILY  , Disp: 180 capsule, Rfl: 0    travoprost (TRAVATAN-Z) 0 004 % ophthalmic solution, 1 drop daily at bedtime, Disp: , Rfl:     nitrofurantoin (MACROBID) 100 mg capsule, Take 1 capsule (100 mg total) by mouth 2 (two) times a day for 7 days, Disp: 14 capsule, Rfl: 0    Current Allergies     Allergies as of 08/21/2020 - Reviewed 08/21/2020   Allergen Reaction Noted    Ciprofloxacin  02/03/2020    Clindamycin  02/03/2020    Doxycycline  02/03/2020    Keflex [cephalexin]  02/03/2020    Naproxen Hives and Other (See Comments) 08/03/2017    Sulfa antibiotics Hives, Itching, and Swelling 08/03/2017            The following portions of the patient's history were reviewed and updated as appropriate: allergies, current medications, past family history, past medical history, past social history, past surgical history and problem list      Past Medical History:   Diagnosis Date    Cancer ([facility] [address] )     gallbladder    GERD (gastroesophageal reflux disease)     Glaucoma     Hyperlipemia     Murmur     Rheumatic fever        Past Surgical History:   Procedure Laterality Date    CHOLECYSTECTOMY      TONSILLECTOMY  CHILDHOOD       Family History   Problem Relation Age of Onset    Heart attack Mother     Kidney disease Father     Breast cancer Sister 77    No Known Problems Daughter     No Known Problems Maternal Grandmother     No Known Problems Paternal Grandmother     No Known Problems Sister     No Known Problems Maternal Aunt     No Known Problems Maternal Aunt     No Known Problems Maternal Aunt     No Known Problems Paternal Aunt     No Known Problems Paternal Aunt          Medications have been verified  Objective   /64   Pulse 72   Temp 98 6 °F (37 °C) (Temporal)   Resp 12   Ht 5' 3" (1 6 m)   Wt 85 7 kg (189 lb)   SpO2 98%   BMI 33 48 kg/m²        Physical Exam     Physical Exam  Vitals signs and nursing note reviewed  Constitutional:       General: She is not in acute distress  Appearance: Normal appearance  She is well-developed  She is not ill-appearing     Eyes:      General: Lids are normal    Cardiovascular:      Rate and Rhythm: Normal rate and regular rhythm  Heart sounds: S1 normal and S2 normal  Murmur present  Pulmonary:      Effort: Pulmonary effort is normal  No respiratory distress  Breath sounds: Normal breath sounds  No decreased breath sounds or wheezing  Musculoskeletal: Normal range of motion  General: No tenderness or deformity  Skin:     General: Skin is warm  Findings: No erythema or rash  Neurological:      Mental Status: She is alert and oriented to person, place, and time  Psychiatric:         Behavior: Behavior normal  Behavior is cooperative  Thought Content:  Thought content normal

## 2025-06-06 ENCOUNTER — CLINICAL SUPPORT (OUTPATIENT)
Dept: FAMILY MEDICINE CLINIC | Facility: CLINIC | Age: 75
End: 2025-06-06
Payer: COMMERCIAL

## 2025-06-06 ENCOUNTER — TELEPHONE (OUTPATIENT)
Dept: FAMILY MEDICINE CLINIC | Facility: CLINIC | Age: 75
End: 2025-06-06

## 2025-06-06 DIAGNOSIS — R42 DIZZINESS: ICD-10-CM

## 2025-06-06 DIAGNOSIS — R39.9 UTI SYMPTOMS: Primary | ICD-10-CM

## 2025-06-06 DIAGNOSIS — N39.0 URINARY TRACT INFECTION WITHOUT HEMATURIA, SITE UNSPECIFIED: Primary | ICD-10-CM

## 2025-06-06 DIAGNOSIS — R39.9 UTI SYMPTOMS: ICD-10-CM

## 2025-06-06 LAB
SL AMB  POCT GLUCOSE, UA: NEGATIVE
SL AMB LEUKOCYTE ESTERASE,UA: ABNORMAL
SL AMB POCT BILIRUBIN,UA: NEGATIVE
SL AMB POCT BLOOD,UA: ABNORMAL
SL AMB POCT CLARITY,UA: ABNORMAL
SL AMB POCT COLOR,UA: ABNORMAL
SL AMB POCT KETONES,UA: NEGATIVE
SL AMB POCT NITRITE,UA: NEGATIVE
SL AMB POCT PH,UA: 6.5
SL AMB POCT SPECIFIC GRAVITY,UA: 1.02
SL AMB POCT URINE PROTEIN: ABNORMAL
SL AMB POCT UROBILINOGEN: ABNORMAL

## 2025-06-06 PROCEDURE — 87077 CULTURE AEROBIC IDENTIFY: CPT | Performed by: NURSE PRACTITIONER

## 2025-06-06 PROCEDURE — 81002 URINALYSIS NONAUTO W/O SCOPE: CPT

## 2025-06-06 PROCEDURE — 99211 OFF/OP EST MAY X REQ PHY/QHP: CPT

## 2025-06-06 PROCEDURE — 87086 URINE CULTURE/COLONY COUNT: CPT | Performed by: NURSE PRACTITIONER

## 2025-06-06 PROCEDURE — 87186 SC STD MICRODIL/AGAR DIL: CPT | Performed by: NURSE PRACTITIONER

## 2025-06-06 RX ORDER — AMOXICILLIN 500 MG/1
500 CAPSULE ORAL EVERY 8 HOURS SCHEDULED
Qty: 21 CAPSULE | Refills: 0 | Status: SHIPPED | OUTPATIENT
Start: 2025-06-06 | End: 2025-06-13

## 2025-06-06 NOTE — TELEPHONE ENCOUNTER
Patient provided urine sample via nurse visit. Ran urine dip on sample and showed Large amounts of Leukocytes and Blood. Spoke with PCP, will send out a culture and PCP will send rx to pharmacy.

## 2025-06-06 NOTE — TELEPHONE ENCOUNTER
I sent in amoxicillin to the pharmacy for her. She should wait for the culture and if it comes back positive then she should start the amoxicillin. Will need to hold the methenamine and Macrobid while on the amoxicillin. I sent it in due to it being the weekend.

## 2025-06-06 NOTE — TELEPHONE ENCOUNTER
Patient returned call to PCP office. Informed her of results and recommendations, per PCP note.     Patient states she has a lot of pain with urination, symptoms are worse than usual.     She asks if she can take any over-the-counter medication or start the amoxicillin awhile to help with the discomfort.    Patient requests return call today.

## 2025-06-06 NOTE — TELEPHONE ENCOUNTER
Left message for patient requesting call back. Also sent appCREARhart message with information since the weekend is coming.

## 2025-06-06 NOTE — TELEPHONE ENCOUNTER
Patient called in today. She is having dizziness and thinks she has another UTI infection. Advised the PEP to place patient in nurse to leave a urine sample. Will also send to provider to see if PT can be squeezed in due to it being the weekend or medication sent.

## 2025-06-08 LAB — BACTERIA UR CULT: ABNORMAL

## 2025-06-11 ENCOUNTER — TELEPHONE (OUTPATIENT)
Dept: FAMILY MEDICINE CLINIC | Facility: CLINIC | Age: 75
End: 2025-06-11

## 2025-06-11 DIAGNOSIS — H81.10 BENIGN PAROXYSMAL POSITIONAL VERTIGO, UNSPECIFIED LATERALITY: ICD-10-CM

## 2025-06-11 DIAGNOSIS — R14.2 BELCHING SYMPTOM: ICD-10-CM

## 2025-06-11 DIAGNOSIS — K21.9 GASTROESOPHAGEAL REFLUX DISEASE WITHOUT ESOPHAGITIS: ICD-10-CM

## 2025-06-11 RX ORDER — SUCRALFATE 1 G/1
1 TABLET ORAL 3 TIMES DAILY
Qty: 90 TABLET | Refills: 1 | Status: SHIPPED | OUTPATIENT
Start: 2025-06-11

## 2025-06-11 NOTE — TELEPHONE ENCOUNTER
Patient called back, relayed providers message. Patient acknowledged.     Patient says she doesn't have any congestion.    She asked for a refill on the meclizine since there are no alternatives. (I did put in a refill request already).    Thank you!

## 2025-06-11 NOTE — TELEPHONE ENCOUNTER
Requested Prescriptions     Pending Prescriptions Disp Refills    meclizine (ANTIVERT) 25 mg tablet 30 tablet 0     Sig: Take 1 tablet (25 mg total) by mouth 3 (three) times a day as needed for dizziness     [address] [phone]        Patient wants a refill on this, since there are no alternatives per Abby's message on encounter from today 06/11.       Please advise. Thank you!

## 2025-06-11 NOTE — TELEPHONE ENCOUNTER
Patient called the RX Refill Line. Message is being forwarded to the office.     Patient is requesting an alternate to meclizine has been taking for a week and only works some of the time for her dizziness      Patient also wanted to review her urine cultures.  She stated he could not take the pain anymore so she started the Amoxicillin      Please contact patient directly to review

## 2025-06-11 NOTE — TELEPHONE ENCOUNTER
There are no alternatives for meclizine for dizziness.  Is she having any congestion that could be worsening the dizziness?     Urine culture did have a small amount of bacteria but was not highly positive. If the amoxicillin is helping with her symptoms she should complete the course.

## 2025-06-11 NOTE — TELEPHONE ENCOUNTER
Reason for call:   [x] Refill   [] Prior Auth  [] Other:     Office:   [x] PCP/Provider -   [] Specialty/Provider -     Medication: sucralfate (CARAFATE) 1 g tablet [    Dose/Frequency: take 1 tablet by mouth three times a day     Quantity: 90    Pharmacy: [facility] [place] [address] [phone]    Local Pharmacy   Does the patient have enough for 3 days?   [x] Yes   [] No - Send as HP to POD

## 2025-06-12 RX ORDER — MECLIZINE HYDROCHLORIDE 25 MG/1
25 TABLET ORAL 3 TIMES DAILY PRN
Qty: 30 TABLET | Refills: 0 | Status: SHIPPED | OUTPATIENT
Start: 2025-06-12

## 2025-06-17 DIAGNOSIS — H81.10 BENIGN PAROXYSMAL POSITIONAL VERTIGO, UNSPECIFIED LATERALITY: ICD-10-CM

## 2025-06-17 RX ORDER — MECLIZINE HYDROCHLORIDE 25 MG/1
25 TABLET ORAL 3 TIMES DAILY PRN
Qty: 30 TABLET | Refills: 0 | OUTPATIENT
Start: 2025-06-17

## 2025-06-19 ENCOUNTER — RA CDI HCC (OUTPATIENT)
Dept: OTHER | Facility: HOSPITAL | Age: 75
End: 2025-06-19

## 2025-06-29 DIAGNOSIS — I63.9 STROKE (HCC): ICD-10-CM

## 2025-06-30 RX ORDER — RIBOFLAVIN (VITAMIN B2) 400 MG
1 TABLET ORAL 2 TIMES DAILY
Qty: 180 TABLET | Refills: 1 | Status: SHIPPED | OUTPATIENT
Start: 2025-06-30

## 2025-07-07 NOTE — TELEPHONE ENCOUNTER
Requesting medication refill. Please approve or deny this request.    Rx requested:  Requested Prescriptions     Pending Prescriptions Disp Refills    atorvastatin (LIPITOR) 40 MG tablet 90 tablet 3     Sig: Take 1 tablet by mouth daily         Last Office Visit:   1/6/25 WITH CARISSA      Next Visit Date:  Future Appointments   Date Time Provider Department Center   9/12/2025 11:15 AM Jus Gooden DO [facility] DE[facility]                Please approve or deny.     Sabrina ordered. Sending to PCP as FYI

## 2025-07-14 ENCOUNTER — TELEPHONE (OUTPATIENT)
Age: 75
End: 2025-07-14

## 2025-07-14 LAB
ALBUMIN SERPL-MCNC: 4.3 G/DL (ref 3.5–5.7)
ALP SERPL-CCNC: 84 U/L (ref 35–120)
ALT SERPL-CCNC: 15 U/L
ANION GAP SERPL CALCULATED.3IONS-SCNC: 11 MMOL/L (ref 3–11)
AST SERPL-CCNC: 19 U/L
BASOPHILS # BLD AUTO: 0.1 THOU/CMM (ref 0–0.1)
BASOPHILS NFR BLD AUTO: 1 %
BILIRUB SERPL-MCNC: 0.6 MG/DL (ref 0.2–1)
BUN SERPL-MCNC: 21 MG/DL (ref 7–25)
CALCIUM SERPL-MCNC: 9.1 MG/DL (ref 8.5–10.5)
CHLORIDE SERPL-SCNC: 106 MMOL/L (ref 100–109)
CHOLEST SERPL-MCNC: 189 MG/DL
CHOLEST/HDLC SERPL: 4.3 {RATIO}
CO2 SERPL-SCNC: 26 MMOL/L (ref 21–31)
CREAT SERPL-MCNC: 0.8 MG/DL (ref 0.4–1.1)
CYTOLOGY CMNT CVX/VAG CYTO-IMP: ABNORMAL
DIFFERENTIAL METHOD BLD: ABNORMAL
EOSINOPHIL # BLD AUTO: 0.3 THOU/CMM (ref 0–0.5)
EOSINOPHIL NFR BLD AUTO: 6 %
ERYTHROCYTE [DISTWIDTH] IN BLOOD BY AUTOMATED COUNT: 14.2 % (ref 12–16)
EST. AVERAGE GLUCOSE BLD GHB EST-MCNC: 123 MG/DL
GFR/BSA.PRED SERPLBLD CYS-BASED-ARV: 77 ML/MIN/{1.73_M2}
GLUCOSE SERPL-MCNC: 103 MG/DL (ref 65–99)
HBA1C MFR BLD: 5.9 %
HCT VFR BLD AUTO: 42.9 % (ref 35–43)
HDLC SERPL-MCNC: 44 MG/DL (ref 23–92)
HGB BLD-MCNC: 14.7 G/DL (ref 11.5–14.5)
LDLC SERPL CALC-MCNC: 127 MG/DL
LYMPHOCYTES # BLD AUTO: 1.4 THOU/CMM (ref 1–3)
LYMPHOCYTES NFR BLD AUTO: 31 %
MCH RBC QN AUTO: 31.8 PG (ref 26–34)
MCHC RBC AUTO-ENTMCNC: 34.3 G/DL (ref 32–37)
MCV RBC AUTO: 93 FL (ref 80–100)
MONOCYTES # BLD AUTO: 0.4 THOU/CMM (ref 0.3–1)
MONOCYTES NFR BLD AUTO: 9 %
NEUTROPHILS # BLD AUTO: 2.4 THOU/CMM (ref 1.8–7.8)
NEUTROPHILS NFR BLD AUTO: 53 %
NONHDLC SERPL-MCNC: 145 MG/DL
PLATELET # BLD AUTO: 267 THOU/CMM (ref 140–350)
PMV BLD REES-ECKER: 7.6 FL (ref 7.5–11.3)
POTASSIUM SERPL-SCNC: 4.3 MMOL/L (ref 3.5–5.2)
PROT SERPL-MCNC: 6.9 G/DL (ref 6.3–8.3)
RBC # BLD AUTO: 4.63 MILL/CMM (ref 3.7–4.7)
SODIUM SERPL-SCNC: 143 MMOL/L (ref 135–145)
TRIGL SERPL-MCNC: 88 MG/DL
WBC # BLD AUTO: 4.5 THOU/CMM (ref 4–10)

## 2025-07-25 ENCOUNTER — OFFICE VISIT (OUTPATIENT)
Dept: FAMILY MEDICINE CLINIC | Facility: CLINIC | Age: 75
End: 2025-07-25
Payer: COMMERCIAL

## 2025-07-25 VITALS
RESPIRATION RATE: 18 BRPM | HEIGHT: 63 IN | BODY MASS INDEX: 30.12 KG/M2 | HEART RATE: 62 BPM | WEIGHT: 170 LBS | TEMPERATURE: 98.1 F | OXYGEN SATURATION: 98 % | SYSTOLIC BLOOD PRESSURE: 118 MMHG | DIASTOLIC BLOOD PRESSURE: 72 MMHG

## 2025-07-25 DIAGNOSIS — L23.7 POISON IVY DERMATITIS: ICD-10-CM

## 2025-07-25 DIAGNOSIS — K21.9 GASTROESOPHAGEAL REFLUX DISEASE WITHOUT ESOPHAGITIS: Primary | ICD-10-CM

## 2025-07-25 DIAGNOSIS — R68.2 DRY MOUTH: ICD-10-CM

## 2025-07-25 DIAGNOSIS — R10.10 PAIN OF UPPER ABDOMEN: ICD-10-CM

## 2025-07-25 DIAGNOSIS — R11.0 NAUSEA: ICD-10-CM

## 2025-07-25 DIAGNOSIS — R23.2 HOT FLASHES: ICD-10-CM

## 2025-07-25 DIAGNOSIS — R73.03 PREDIABETES: ICD-10-CM

## 2025-07-25 DIAGNOSIS — J44.9 COPD, GROUP A, BY GOLD 2017 CLASSIFICATION (HCC): ICD-10-CM

## 2025-07-25 DIAGNOSIS — C23 PRIMARY GALL BLADDER ADENOCARCINOMA (HCC): ICD-10-CM

## 2025-07-25 DIAGNOSIS — M79.10 MYALGIA, UNSPECIFIED SITE: ICD-10-CM

## 2025-07-25 DIAGNOSIS — E78.1 PURE HYPERTRIGLYCERIDEMIA: ICD-10-CM

## 2025-07-25 DIAGNOSIS — K14.6 BURNING MOUTH SYNDROME: ICD-10-CM

## 2025-07-25 PROCEDURE — G2211 COMPLEX E/M VISIT ADD ON: HCPCS | Performed by: NURSE PRACTITIONER

## 2025-07-25 PROCEDURE — 99214 OFFICE O/P EST MOD 30 MIN: CPT | Performed by: NURSE PRACTITIONER

## 2025-07-25 RX ORDER — PREDNISONE 20 MG/1
TABLET ORAL
Qty: 12 TABLET | Refills: 0 | Status: SHIPPED | OUTPATIENT
Start: 2025-07-25 | End: 2025-08-02

## 2025-07-30 ENCOUNTER — NURSE TRIAGE (OUTPATIENT)
Age: 75
End: 2025-07-30

## 2025-07-30 DIAGNOSIS — K21.9 GASTROESOPHAGEAL REFLUX DISEASE, UNSPECIFIED WHETHER ESOPHAGITIS PRESENT: Primary | ICD-10-CM

## 2025-07-30 RX ORDER — OMEPRAZOLE 40 MG/1
40 CAPSULE, DELAYED RELEASE ORAL 2 TIMES DAILY
Qty: 60 CAPSULE | Refills: 3 | Status: SHIPPED | OUTPATIENT
Start: 2025-07-30

## 2025-08-11 ENCOUNTER — NURSE TRIAGE (OUTPATIENT)
Age: 75
End: 2025-08-11

## 2025-08-12 ENCOUNTER — OFFICE VISIT (OUTPATIENT)
Dept: FAMILY MEDICINE CLINIC | Facility: CLINIC | Age: 75
End: 2025-08-12
Payer: COMMERCIAL

## 2025-08-14 ENCOUNTER — NURSE TRIAGE (OUTPATIENT)
Age: 75
End: 2025-08-14